# Patient Record
Sex: MALE | Race: WHITE | NOT HISPANIC OR LATINO | URBAN - METROPOLITAN AREA
[De-identification: names, ages, dates, MRNs, and addresses within clinical notes are randomized per-mention and may not be internally consistent; named-entity substitution may affect disease eponyms.]

---

## 2018-08-27 ENCOUNTER — OUTPATIENT (OUTPATIENT)
Dept: OUTPATIENT SERVICES | Facility: HOSPITAL | Age: 68
LOS: 1 days | Discharge: STILL ADMITTED OR ADMINISTRATIVE | End: 2018-08-27

## 2018-08-27 ENCOUNTER — EMERGENCY (EMERGENCY)
Facility: HOSPITAL | Age: 68
LOS: 1 days | Discharge: HOME | End: 2018-08-27
Admitting: EMERGENCY MEDICINE
Payer: MEDICARE

## 2018-08-27 VITALS
RESPIRATION RATE: 16 BRPM | SYSTOLIC BLOOD PRESSURE: 129 MMHG | OXYGEN SATURATION: 98 % | DIASTOLIC BLOOD PRESSURE: 94 MMHG | HEART RATE: 61 BPM | TEMPERATURE: 98 F

## 2018-08-27 PROBLEM — Z00.00 ENCOUNTER FOR PREVENTIVE HEALTH EXAMINATION: Status: ACTIVE | Noted: 2018-08-27

## 2018-08-27 PROCEDURE — 99283 EMERGENCY DEPT VISIT LOW MDM: CPT

## 2018-08-27 NOTE — ED PROVIDER NOTE - OBJECTIVE STATEMENT
This is a 68 year old Male BIBIA from home with His wife for eval r/t insomnia. Patient is seeing a PMD who was prescribing Ambien and Xanax recent he was switch to Trazadone 100 mg PO at bedtime and told to follow up with a psychiatrist. Patient has been unable to see a psychiatrist and reports insomnia for two days. Denies SI/HI Denies AH/VH Denies ETOH/Illicit drugs Rodney past Si attempts ,hospitalizations. Wife Rosa [phone] does not have any safety concers for self or patient

## 2018-08-27 NOTE — ED PROVIDER NOTE - MEDICAL DECISION MAKING DETAILS
This is a 68 year old Male BIBIA from home with His wife for eval r/t insomnia. Patient is seeing a PMD who was prescribing Ambien and Xanax recent he was switch to Trazadone 100 mg PO at bedtime and told to follow up with a psychiatrist. Patient has been unable to see a psychiatrist and reports insomnia for two days Medical evaluation performed. There is no clinical evidence of intoxication or any acute medical problem requiring immediate intervention. recommend follow up with [facility] crisis center

## 2018-08-27 NOTE — ED ADULT TRIAGE NOTE - CHIEF COMPLAINT QUOTE
PMH insomnia, normally given ambien from primary, switched to a new medication. Pt states he has not slept in 3 days.

## 2018-08-28 DIAGNOSIS — F41.9 ANXIETY DISORDER, UNSPECIFIED: ICD-10-CM

## 2018-08-28 DIAGNOSIS — G47.00 INSOMNIA, UNSPECIFIED: ICD-10-CM

## 2019-01-28 NOTE — ED PROVIDER NOTE - NS ED ROS FT
no
Denies chest pain, SOB, N/V/D and fevers, Denies palpitations or diaphoresis. Denies Numbness, Tingling, Blurry Vision and HA.   Denies recent falls, trauma and injuries. Denies pain or any other medical complaints.

## 2021-11-02 ENCOUNTER — INPATIENT (INPATIENT)
Facility: HOSPITAL | Age: 71
LOS: 0 days | Discharge: ANOTHER HOSPITAL | DRG: 177 | End: 2021-11-03
Attending: INTERNAL MEDICINE | Admitting: INTERNAL MEDICINE
Payer: MEDICARE

## 2021-11-02 VITALS
DIASTOLIC BLOOD PRESSURE: 91 MMHG | OXYGEN SATURATION: 93 % | TEMPERATURE: 98 F | SYSTOLIC BLOOD PRESSURE: 138 MMHG | HEIGHT: 71 IN | HEART RATE: 76 BPM | RESPIRATION RATE: 16 BRPM | WEIGHT: 175.05 LBS

## 2021-11-02 DIAGNOSIS — R74.01 ELEVATION OF LEVELS OF LIVER TRANSAMINASE LEVELS: ICD-10-CM
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
## 2021-11-02 DIAGNOSIS — M62.82 RHABDOMYOLYSIS: ICD-10-CM

## 2021-11-02 DIAGNOSIS — U07.1 COVID-19: ICD-10-CM

## 2021-11-02 DIAGNOSIS — I10 ESSENTIAL (PRIMARY) HYPERTENSION: ICD-10-CM

## 2021-11-02 DIAGNOSIS — R09.89 OTHER SPECIFIED SYMPTOMS AND SIGNS INVOLVING THE CIRCULATORY AND RESPIRATORY SYSTEMS: ICD-10-CM

## 2021-11-02 DIAGNOSIS — Z29.9 ENCOUNTER FOR PROPHYLACTIC MEASURES, UNSPECIFIED: ICD-10-CM

## 2021-11-02 DIAGNOSIS — J96.01 ACUTE RESPIRATORY FAILURE WITH HYPOXIA: ICD-10-CM

## 2021-11-02 LAB
ALBUMIN SERPL ELPH-MCNC: 2.6 G/DL — LOW (ref 3.5–5)
ALP SERPL-CCNC: 53 U/L — SIGNIFICANT CHANGE UP (ref 40–120)
ALT FLD-CCNC: 96 U/L DA — HIGH (ref 10–60)
AMPHET UR-MCNC: NEGATIVE — SIGNIFICANT CHANGE UP
ANION GAP SERPL CALC-SCNC: 9 MMOL/L — SIGNIFICANT CHANGE UP (ref 5–17)
APAP SERPL-MCNC: <2 UG/ML — LOW (ref 10–30)
APPEARANCE UR: CLEAR — SIGNIFICANT CHANGE UP
AST SERPL-CCNC: 327 U/L — HIGH (ref 10–40)
BARBITURATES UR SCN-MCNC: NEGATIVE — SIGNIFICANT CHANGE UP
BASE EXCESS BLDA CALC-SCNC: 6.3 MMOL/L — HIGH (ref -2–3)
BASOPHILS # BLD AUTO: 0.01 K/UL — SIGNIFICANT CHANGE UP (ref 0–0.2)
BASOPHILS NFR BLD AUTO: 0.1 % — SIGNIFICANT CHANGE UP (ref 0–2)
BENZODIAZ UR-MCNC: POSITIVE
BILIRUB SERPL-MCNC: 1 MG/DL — SIGNIFICANT CHANGE UP (ref 0.2–1.2)
BILIRUB UR-MCNC: NEGATIVE — SIGNIFICANT CHANGE UP
BLOOD GAS COMMENTS ARTERIAL: SIGNIFICANT CHANGE UP
BUN SERPL-MCNC: 38 MG/DL — HIGH (ref 7–18)
CALCIUM SERPL-MCNC: 8.9 MG/DL — SIGNIFICANT CHANGE UP (ref 8.4–10.5)
CHLORIDE SERPL-SCNC: 103 MMOL/L — SIGNIFICANT CHANGE UP (ref 96–108)
CK SERPL-CCNC: 9896 U/L — HIGH (ref 35–232)
CK SERPL-CCNC: CRITICAL HIGH U/L (ref 35–232)
CO2 SERPL-SCNC: 26 MMOL/L — SIGNIFICANT CHANGE UP (ref 22–31)
COCAINE METAB.OTHER UR-MCNC: NEGATIVE — SIGNIFICANT CHANGE UP
COLOR SPEC: YELLOW — SIGNIFICANT CHANGE UP
CREAT SERPL-MCNC: 0.8 MG/DL — SIGNIFICANT CHANGE UP (ref 0.5–1.3)
D DIMER BLD IA.RAPID-MCNC: 988 NG/ML DDU — HIGH
DIFF PNL FLD: ABNORMAL
EOSINOPHIL # BLD AUTO: 0 K/UL — SIGNIFICANT CHANGE UP (ref 0–0.5)
EOSINOPHIL NFR BLD AUTO: 0 % — SIGNIFICANT CHANGE UP (ref 0–6)
ETHANOL SERPL-MCNC: <3 MG/DL — SIGNIFICANT CHANGE UP (ref 0–10)
GLUCOSE SERPL-MCNC: 121 MG/DL — HIGH (ref 70–99)
GLUCOSE UR QL: NEGATIVE — SIGNIFICANT CHANGE UP
HCO3 BLDA-SCNC: 30 MMOL/L — HIGH (ref 21–28)
HCT VFR BLD CALC: 48.1 % — SIGNIFICANT CHANGE UP (ref 39–50)
HGB BLD-MCNC: 16.5 G/DL — SIGNIFICANT CHANGE UP (ref 13–17)
HOROWITZ INDEX BLDA+IHG-RTO: SIGNIFICANT CHANGE UP
IMM GRANULOCYTES NFR BLD AUTO: 0.5 % — SIGNIFICANT CHANGE UP (ref 0–1.5)
KETONES UR-MCNC: NEGATIVE — SIGNIFICANT CHANGE UP
LACTATE SERPL-SCNC: 1.9 MMOL/L — SIGNIFICANT CHANGE UP (ref 0.7–2)
LEUKOCYTE ESTERASE UR-ACNC: NEGATIVE — SIGNIFICANT CHANGE UP
LYMPHOCYTES # BLD AUTO: 1.16 K/UL — SIGNIFICANT CHANGE UP (ref 1–3.3)
LYMPHOCYTES # BLD AUTO: 14.4 % — SIGNIFICANT CHANGE UP (ref 13–44)
MAGNESIUM SERPL-MCNC: 2.8 MG/DL — HIGH (ref 1.6–2.6)
MCHC RBC-ENTMCNC: 30.1 PG — SIGNIFICANT CHANGE UP (ref 27–34)
MCHC RBC-ENTMCNC: 34.3 GM/DL — SIGNIFICANT CHANGE UP (ref 32–36)
MCV RBC AUTO: 87.6 FL — SIGNIFICANT CHANGE UP (ref 80–100)
METHADONE UR-MCNC: NEGATIVE — SIGNIFICANT CHANGE UP
MONOCYTES # BLD AUTO: 0.8 K/UL — SIGNIFICANT CHANGE UP (ref 0–0.9)
MONOCYTES NFR BLD AUTO: 9.9 % — SIGNIFICANT CHANGE UP (ref 2–14)
NEUTROPHILS # BLD AUTO: 6.04 K/UL — SIGNIFICANT CHANGE UP (ref 1.8–7.4)
NEUTROPHILS NFR BLD AUTO: 75.1 % — SIGNIFICANT CHANGE UP (ref 43–77)
NITRITE UR-MCNC: NEGATIVE — SIGNIFICANT CHANGE UP
NRBC # BLD: 0 /100 WBCS — SIGNIFICANT CHANGE UP (ref 0–0)
OPIATES UR-MCNC: NEGATIVE — SIGNIFICANT CHANGE UP
PCO2 BLDA: 37 MMHG — SIGNIFICANT CHANGE UP (ref 35–48)
PCP SPEC-MCNC: SIGNIFICANT CHANGE UP
PCP UR-MCNC: NEGATIVE — SIGNIFICANT CHANGE UP
PH BLDA: 7.51 — HIGH (ref 7.35–7.45)
PH UR: 6 — SIGNIFICANT CHANGE UP (ref 5–8)
PLATELET # BLD AUTO: 127 K/UL — LOW (ref 150–400)
PO2 BLDA: 72 MMHG — LOW (ref 83–108)
POTASSIUM SERPL-MCNC: 3 MMOL/L — LOW (ref 3.5–5.3)
POTASSIUM SERPL-SCNC: 3 MMOL/L — LOW (ref 3.5–5.3)
PROT SERPL-MCNC: 6.7 G/DL — SIGNIFICANT CHANGE UP (ref 6–8.3)
PROT UR-MCNC: 100
RBC # BLD: 5.49 M/UL — SIGNIFICANT CHANGE UP (ref 4.2–5.8)
RBC # FLD: 13.5 % — SIGNIFICANT CHANGE UP (ref 10.3–14.5)
SALICYLATES SERPL-MCNC: <1.7 MG/DL — LOW (ref 2.8–20)
SAO2 % BLDA: 96 % — SIGNIFICANT CHANGE UP
SARS-COV-2 RNA SPEC QL NAA+PROBE: DETECTED
SODIUM SERPL-SCNC: 138 MMOL/L — SIGNIFICANT CHANGE UP (ref 135–145)
SP GR SPEC: 1.02 — SIGNIFICANT CHANGE UP (ref 1.01–1.02)
THC UR QL: NEGATIVE — SIGNIFICANT CHANGE UP
TROPONIN I, HIGH SENSITIVITY RESULT: 244 NG/L — HIGH
TROPONIN I, HIGH SENSITIVITY RESULT: 247.8 NG/L — HIGH
UROBILINOGEN FLD QL: 4
WBC # BLD: 8.05 K/UL — SIGNIFICANT CHANGE UP (ref 3.8–10.5)
WBC # FLD AUTO: 8.05 K/UL — SIGNIFICANT CHANGE UP (ref 3.8–10.5)

## 2021-11-02 PROCEDURE — 83605 ASSAY OF LACTIC ACID: CPT

## 2021-11-02 PROCEDURE — 96375 TX/PRO/DX INJ NEW DRUG ADDON: CPT

## 2021-11-02 PROCEDURE — 85379 FIBRIN DEGRADATION QUANT: CPT

## 2021-11-02 PROCEDURE — 80307 DRUG TEST PRSMV CHEM ANLYZR: CPT

## 2021-11-02 PROCEDURE — 80053 COMPREHEN METABOLIC PANEL: CPT

## 2021-11-02 PROCEDURE — 96376 TX/PRO/DX INJ SAME DRUG ADON: CPT

## 2021-11-02 PROCEDURE — 87635 SARS-COV-2 COVID-19 AMP PRB: CPT

## 2021-11-02 PROCEDURE — 71045 X-RAY EXAM CHEST 1 VIEW: CPT

## 2021-11-02 PROCEDURE — 84484 ASSAY OF TROPONIN QUANT: CPT

## 2021-11-02 PROCEDURE — 80074 ACUTE HEPATITIS PANEL: CPT

## 2021-11-02 PROCEDURE — 93010 ELECTROCARDIOGRAM REPORT: CPT

## 2021-11-02 PROCEDURE — 83735 ASSAY OF MAGNESIUM: CPT

## 2021-11-02 PROCEDURE — 70450 CT HEAD/BRAIN W/O DYE: CPT | Mod: MA

## 2021-11-02 PROCEDURE — 82962 GLUCOSE BLOOD TEST: CPT

## 2021-11-02 PROCEDURE — 99285 EMERGENCY DEPT VISIT HI MDM: CPT | Mod: CS

## 2021-11-02 PROCEDURE — 82550 ASSAY OF CK (CPK): CPT

## 2021-11-02 PROCEDURE — 70450 CT HEAD/BRAIN W/O DYE: CPT | Mod: 26,MA

## 2021-11-02 PROCEDURE — 87086 URINE CULTURE/COLONY COUNT: CPT

## 2021-11-02 PROCEDURE — 71045 X-RAY EXAM CHEST 1 VIEW: CPT | Mod: 26

## 2021-11-02 PROCEDURE — 99285 EMERGENCY DEPT VISIT HI MDM: CPT | Mod: 25

## 2021-11-02 PROCEDURE — 87040 BLOOD CULTURE FOR BACTERIA: CPT

## 2021-11-02 PROCEDURE — 82803 BLOOD GASES ANY COMBINATION: CPT

## 2021-11-02 PROCEDURE — 85025 COMPLETE CBC W/AUTO DIFF WBC: CPT

## 2021-11-02 PROCEDURE — 93005 ELECTROCARDIOGRAM TRACING: CPT

## 2021-11-02 PROCEDURE — 81001 URINALYSIS AUTO W/SCOPE: CPT

## 2021-11-02 PROCEDURE — 96374 THER/PROPH/DIAG INJ IV PUSH: CPT

## 2021-11-02 PROCEDURE — 36415 COLL VENOUS BLD VENIPUNCTURE: CPT

## 2021-11-02 RX ORDER — SODIUM CHLORIDE 9 MG/ML
1000 INJECTION INTRAMUSCULAR; INTRAVENOUS; SUBCUTANEOUS ONCE
Refills: 0 | Status: COMPLETED | OUTPATIENT
Start: 2021-11-02 | End: 2021-11-02

## 2021-11-02 RX ORDER — ENOXAPARIN SODIUM 100 MG/ML
40 INJECTION SUBCUTANEOUS DAILY
Refills: 0 | Status: DISCONTINUED | OUTPATIENT
Start: 2021-11-02 | End: 2021-11-02

## 2021-11-02 RX ORDER — ENOXAPARIN SODIUM 100 MG/ML
80 INJECTION SUBCUTANEOUS
Refills: 0 | Status: DISCONTINUED | OUTPATIENT
Start: 2021-11-02 | End: 2021-11-03

## 2021-11-02 RX ORDER — POTASSIUM CHLORIDE 20 MEQ
10 PACKET (EA) ORAL
Refills: 0 | Status: COMPLETED | OUTPATIENT
Start: 2021-11-02 | End: 2021-11-02

## 2021-11-02 RX ORDER — PANTOPRAZOLE SODIUM 20 MG/1
40 TABLET, DELAYED RELEASE ORAL DAILY
Refills: 0 | Status: DISCONTINUED | OUTPATIENT
Start: 2021-11-02 | End: 2021-11-03

## 2021-11-02 RX ORDER — SODIUM CHLORIDE 9 MG/ML
1000 INJECTION INTRAMUSCULAR; INTRAVENOUS; SUBCUTANEOUS
Refills: 0 | Status: DISCONTINUED | OUTPATIENT
Start: 2021-11-02 | End: 2021-11-03

## 2021-11-02 RX ORDER — DEXAMETHASONE 0.5 MG/5ML
6 ELIXIR ORAL DAILY
Refills: 0 | Status: DISCONTINUED | OUTPATIENT
Start: 2021-11-03 | End: 2021-11-03

## 2021-11-02 RX ORDER — ASPIRIN/CALCIUM CARB/MAGNESIUM 324 MG
300 TABLET ORAL ONCE
Refills: 0 | Status: COMPLETED | OUTPATIENT
Start: 2021-11-02 | End: 2021-11-02

## 2021-11-02 RX ORDER — DEXAMETHASONE 0.5 MG/5ML
6 ELIXIR ORAL ONCE
Refills: 0 | Status: COMPLETED | OUTPATIENT
Start: 2021-11-02 | End: 2021-11-02

## 2021-11-02 RX ADMIN — Medication 6 MILLIGRAM(S): at 15:15

## 2021-11-02 RX ADMIN — Medication 100 MILLIEQUIVALENT(S): at 15:15

## 2021-11-02 RX ADMIN — SODIUM CHLORIDE 1000 MILLILITER(S): 9 INJECTION INTRAMUSCULAR; INTRAVENOUS; SUBCUTANEOUS at 16:16

## 2021-11-02 RX ADMIN — SODIUM CHLORIDE 1000 MILLILITER(S): 9 INJECTION INTRAMUSCULAR; INTRAVENOUS; SUBCUTANEOUS at 13:33

## 2021-11-02 RX ADMIN — SODIUM CHLORIDE 1000 MILLILITER(S): 9 INJECTION INTRAMUSCULAR; INTRAVENOUS; SUBCUTANEOUS at 15:16

## 2021-11-02 RX ADMIN — ENOXAPARIN SODIUM 80 MILLIGRAM(S): 100 INJECTION SUBCUTANEOUS at 20:56

## 2021-11-02 RX ADMIN — Medication 100 MILLIEQUIVALENT(S): at 17:23

## 2021-11-02 RX ADMIN — SODIUM CHLORIDE 1000 MILLILITER(S): 9 INJECTION INTRAMUSCULAR; INTRAVENOUS; SUBCUTANEOUS at 14:33

## 2021-11-02 RX ADMIN — Medication 100 MILLIEQUIVALENT(S): at 14:08

## 2021-11-02 RX ADMIN — SODIUM CHLORIDE 150 MILLILITER(S): 9 INJECTION INTRAMUSCULAR; INTRAVENOUS; SUBCUTANEOUS at 19:34

## 2021-11-02 RX ADMIN — Medication 300 MILLIGRAM(S): at 14:07

## 2021-11-02 NOTE — ED PROVIDER NOTE - CARE PLAN
1 Principal Discharge DX:	COVID-19 virus infection  Secondary Diagnosis:	Acute respiratory failure with hypoxia  Secondary Diagnosis:	Rhabdomyolysis  Secondary Diagnosis:	Acute encephalopathy  Secondary Diagnosis:	Demand ischemia of myocardium

## 2021-11-02 NOTE — ED ADULT NURSE REASSESSMENT NOTE - NS ED NURSE REASSESS COMMENT FT1
recived from ANUM Castaneda pt on bed sleeping arousable not in distress , on cont. cardiac monitoring NSR 75/min, cont oxygen at 4-5 L/min NC, with posey in placed draining well light yellow urine, with NS in progess, IV site right FA no   redness or swelling noted. Will cont. care,

## 2021-11-02 NOTE — ED PROVIDER NOTE - CLINICAL SUMMARY MEDICAL DECISION MAKING FREE TEXT BOX
Character low suspicion for CVA and no focal or localizing findings. Noted elevated trop, no CP/SOB and low suspicion for ACS. No e/o DVT and low suspicion for PE. No e/o trauma on exam. Noted hypoxia c/w COVID and CXR findings, no WOB, satting well on 4L NC O2. Character low suspicion for CVA and no focal or localizing findings. Noted elevated trop, no CP/SOB and low suspicion for ACS. No e/o DVT and low suspicion for PE. No e/o trauma on exam. Noted hypoxia c/w COVID and CXR findings, no WOB, satting well on 4L NC O2. No e/o fluid overload. D/w family at length. Patient well appearing, hemodynamically stable. Admitted to internal medicine for further monitoring, w/u, and care.

## 2021-11-02 NOTE — H&P ADULT - PROBLEM SELECTOR PLAN 4
Pt has hx of HTN as per pt's daughter, however unknown medications Pt p/w CK 10k secondary to fall, s/p 2 liter bolus  On IVF 150cc/hr x 6 hrs with close monitoring of fluid status given COVID hypoxia  Creatinine wnl  Urine shows blood in urine  Will monitor respiratory status in 6 hours in order to continue further fluids

## 2021-11-02 NOTE — H&P ADULT - HISTORY OF PRESENT ILLNESS
Patient is a 71 year old English- speaking male coming form home lives with his wife, ambulates independently with PMHx HTN presenting with worsening mentation and lethargy s/p unwitnessed fall in the setting of COVID-19 infection, not vaccinated. Patient is a poor historian, too obtunded to participate during encounter despite multiple sternal rubs and Mongolian .  Patient is a 71 year old Nepalese- speaking male coming form home lives with his wife, ambulates independently with PMHx HTN presenting with worsening mentation and lethargy s/p unwitnessed fall in the setting of COVID-19 infection, not vaccinated. Patient is a poor historian, too obtunded to participate during encounter despite multiple sternal rubs and Russian . Patient found to be off O2, saturating in the low 80s on RA, SpO2 corrected to 95% on 5L NC. Collateral obtained from family, per family patient had an unwitnessed fall coming from the bathroom where he fell and  remained on the floor for more than >1 day. Patient is AOx2, however is obtunded requiring painful stimuli and O2 supplementation. Patient denied fevers, SOB, chest pain, or any changes in BM and urination. Patient wishes to remain FULL CODE.  Patient is a 71 year old Macanese- speaking male coming form home lives with his wife, ambulates independently with PMHx HTN presenting with worsening mentation and lethargy s/p unwitnessed fall in the setting of COVID-19 infection, not vaccinated. Patient is a poor historian, lethargic hence was not able to participate during interview encounter. Pt was found to be saturating in the low 80s on RA, SpO2 corrected to 95% on 5L NC. Collateral obtained from family, per family patient had an unwitnessed fall coming from the bathroom where he fell and  remained on the floor for more than >1 day. Patient is AOx2, however is lethargic, responds to painful stimuli and O2 supplementation. Patient denied fevers, SOB, chest pain, or any changes in BM and urination. Patient wishes to remain FULL CODE.  Patient is a 71 year old Jordanian-speaking male coming form home lives with his wife, ambulates independently with PMHx HTN presenting with worsening mentation and lethargy s/p unwitnessed fall in the setting of COVID-19 infection, not vaccinated. Patient is a poor historian, lethargic hence was not able to participate during interview encounter. Pt was found to be saturating in the low 80s on RA, SpO2 corrected to 95% on 5L NC. Collateral obtained from family, per family patient had an unwitnessed fall coming from the bathroom where he fell and  remained on the floor for more than >1 day. Patient is AOx2, however is lethargic, responds to painful stimuli and O2 supplementation. Patient denied fevers, SOB, chest pain, or any changes in BM and urination. Patient wishes to remain FULL CODE.  Patient is a 71 year old Haitian-speaking male coming from home lives with his wife, ambulates independently with PMHx HTN presenting with worsening mentation and lethargy s/p unwitnessed fall in the setting of COVID-19 infection, not vaccinated. Patient is a poor historian, lethargic hence was not able to participate during interview encounter. Pt was found to be saturating in the low 80s on RA, SpO2 corrected to 95% on 5L NC. Collateral obtained from family, per family patient had an unwitnessed fall coming from the bathroom where he fell and  remained on the floor for more than >1 day. Patient is AOx2, lethargic, responds to painful stimuli and O2 supplementation. Patient denied fevers, SOB, chest pain, or any changes in BM and urination. Patient wishes to remain FULL CODE.     In the ED, pt's vitals were recorded as  /91, HR 76/min, RR 16/min, 93% on RA---> now requiring 5 L NC saturating 95%  EKG Right bundle branch block  As per pt's daughter Manuela, pt takes Hydrocodone, Diazepam, Mirtazapine at home due to "back surgeries/back problems and depression." Utox positive for BZD

## 2021-11-02 NOTE — H&P ADULT - ASSESSMENT
71 year old Jamaican-speaking male coming from home lives with his wife, ambulates independently with PMHx HTN presenting with worsening mentation and lethargy s/p unwitnessed fall in the setting of COVID-19 infection admitted for Acute hypoxic respiratory failure likely secondary to COVID PNA.    ***Please call [facility] pharmacy in AM, pharmacy closed, family is not full aware of the medications  [address] [facility] [address]   [phone]   71 year old Zimbabwean-speaking male coming from home lives with his wife, ambulates independently with PMHx HTN presenting with worsening mentation and lethargy s/p unwitnessed fall in the setting of COVID-19 infection admitted for Acute hypoxic respiratory failure likely secondary to COVID PNA.    ***Please call [facility] in AM, pharmacy closed, family is not full aware of the medications  [address]   [phone]    Please re-visit [facility] conversation with pt's daughter. Currently FULL CODE but pt's daughter Manuela HCP says she needs additional time to rethink the decision

## 2021-11-02 NOTE — H&P ADULT - PROBLEM SELECTOR PLAN 1
p/w with worsening lethargy 2/2 hypoxia in the setting of COVID infection -p/w with worsening lethargy 2/2 hypoxia in the setting of COVID infection  -Documented to have 93% on RA on admission, now requiring 5 L NC to saturate 95%  -D-dimers 988, Right bundle branch block on EKG, pt would benefit from CT angio chest to r/out PE however pt's daughter Manuela HCP DOES NOT want pt to undergo imaging at this time, in agreement to start Lovenox full dose for now  -S/p 1 dose of Decadron IV in ED, c/w IV Decadron 6 mg x 9 days, IV protonix  No remdesivir given elevated LFTs -p/w with worsening lethargy 2/2 hypoxia in the setting of COVID infection  -Documented to have 93% on RA on admission, now requiring 5 L NC to saturate 95%  -D-dimers 988, Right bundle branch block on EKG, pt would benefit from CT angio chest to r/out PE however pt's daughter Manuela HCP DOES NOT want pt to undergo imaging at this time, in agreement to start Lovenox full dose for now  -S/p 1 dose of Decadron IV in ED, c/w IV Decadron 6 mg x 9 days, IV Protonix  No remdesivir given elevated LFTs  Pulm Dr. Mcpherson consulted

## 2021-11-02 NOTE — ACUTE INTERFACILITY TRANSFER NOTE - PLAN OF CARE
71 year old Solomon Islander-speaking male coming from home lives with his wife, ambulates independently with PMHx HTN presenting with worsening mentation and lethargy s/p unwitnessed fall in the setting of COVID-19 infection admitted for Acute hypoxic respiratory failure likely secondary to COVID PNA. 71 year old Faroese-speaking male coming from home lives with his wife, ambulates independently with PMHx HTN presenting with worsening mentation and lethargy s/p unwitnessed fall in the setting of COVID-19 infection admitted for Acute hypoxic respiratory failure likely secondary to COVID PNA.    Transfer report to Dr. Nascimento

## 2021-11-02 NOTE — ACUTE INTERFACILITY TRANSFER NOTE - HOSPITAL COURSE
Transfer requested by family Manuela [phone] - Patient to transfer to [place] receiving Dr. Bertha Hodge    71 year old Nepali-speaking male coming from home lives with his wife, ambulates independently with PMHx HTN presenting with worsening mentation and lethargy s/p unwitnessed fall in the setting of COVID-19 infection admitted for Acute hypoxic respiratory failure likely secondary to COVID PNA.   Patient noted to have 93% on RA on admission, now requiring 5 L NC to saturate 95%, D-dimers 988, Right bundle branch block on EKG, pt would benefit from CT angio chest to r/out PE however pt's daughter Manuela HCP DOES NOT want pt to undergo imaging at this time, in agreement to start Lovenox full dose for now,   Patient S/p 1 dose of Decadron IV in ED, c/w IV Decadron 6 mg x 9 days, and . and  IV Protonix. No remdesivir given elevated LFTs  Pulm Dr. Mcpherson consulted.    CODE STATUS:  Currently FULL CODE but pt's daughter Manuela HCP says she needs additional time to rethink the decision

## 2021-11-02 NOTE — ED ADULT NURSE NOTE - NSFALLRSKPASTHIST_ED_ALL_ED
yes Quality 265: Biopsy Follow-Up: Biopsy results reviewed, communicated, tracked, and documented Detail Level: Detailed Quality 130: Documentation Of Current Medications In The Medical Record: Current Medications Documented

## 2021-11-02 NOTE — H&P ADULT - NSHPSOCIALHISTORY_GEN_ALL_CORE
Patient is a 71 year old male coming form home lives with his wife, ambulates independently.l    Patient is a former smoker, unable to recall exact smoking pack year history.  Patient denies illicit drug use. Patient is a 71 year old male coming form home lives with his wife, ambulates independently.    Patient is a former smoker, unable to recall exact smoking pack year history.  Patient denies illicit drug use.

## 2021-11-02 NOTE — ED PROVIDER NOTE - OBJECTIVE STATEMENT
PLEASE CALL [facility] FOR ALL QUESTIONS/DECISIONS: [phone].  Ugandan  [id number].  71yoM with h/o HTN, no COVID vaccines, presents with weakness. Per his wife at bedside, he fell off the toilet 3 days ago though he did not hit anything and ended up on the toilet afterward. After this he went into the living room and lay on the floor and since then he has not gotten up. He is only drinking water but that is all, and no meds. No actual fall, no head trauma. No fever, v/d, bleeding, cough, and all other symptoms. Pt himself denies all symptoms.

## 2021-11-02 NOTE — H&P ADULT - NSHPREVIEWOFSYSTEMS_GEN_ALL_CORE
REVIEW OF SYSTEMS: UNABLE TO OBTAIN FULL ROS given pt's lethargy    CONSTITUTIONAL: No weakness, fevers or chills  EYES/ENT: No visual changes;  No vertigo or throat pain   NECK: No pain or stiffness  RESPIRATORY: No cough, wheezing, hemoptysis; No shortness of breath  CARDIOVASCULAR: No chest pain or palpitations  GASTROINTESTINAL: No abdominal or epigastric pain. No nausea, vomiting, or hematemesis; No diarrhea or constipation. No melena or hematochezia.  GENITOURINARY: No dysuria, frequency or hematuria  NEUROLOGICAL: No numbness or weakness  SKIN: No itching, burning, rashes, or lesions   All other review of systems is negative unless indicated above.

## 2021-11-02 NOTE — ED PROVIDER NOTE - SECONDARY DIAGNOSIS.
Acute respiratory failure with hypoxia Acute encephalopathy Demand ischemia of myocardium Rhabdomyolysis

## 2021-11-02 NOTE — CHART NOTE - NSCHARTNOTEFT_GEN_A_CORE
Spoke to both of pt's daughters Alysha ([phone]) and Manuela ([facility], [phone]), discussed extensively that pt would benefit from obtaining CT Angio Chest to r/out PE given worsening hypoxia, elevated D-dimers with uncertain medical hx other than s/p fall since "last Thursday." Pt's daughters stated that they want to hold off on any imaging at this time, would like their father to get hydrated and want to work on obtaining further imaging maybe tomorrow after re-assessing patient's clinical status. They are in agreement to start patient on empiric full dose anticoagulation in the interim. All questions answered. Spoke to both of pt's daughters Alysha ([phone]) and Manuela ([facility], [phone]), discussed extensively that pt would benefit from obtaining CT Angio Chest to r/out PE given worsening hypoxia, EKG showing Right bundle branch block (not previous EKG for comparison), elevated D-dimers with uncertain medical hx other than s/p fall since "last Thursday." Pt's daughters stated that they want to hold off on any imaging at this time, would like their father to get hydrated and want to work on obtaining further imaging maybe tomorrow after re-assessing patient's clinical status. They are in agreement to start patient on empiric full dose anticoagulation in the interim. All questions answered.

## 2021-11-02 NOTE — ED ADULT NURSE NOTE - OBJECTIVE STATEMENT
Pt c/o fall 5 days ago. As per wife, pt was sitting on the toilet and fell forward without hitting the floor. Pt then walked to the living room and laid himself on the floor. Denies LOC, vomiting, diarrhea, cough, fever, or bleeding.

## 2021-11-02 NOTE — ACUTE INTERFACILITY TRANSFER NOTE - NS MD INTERFACILITY TRANSFER INST FT
Transfer requested by family Manuela [phone] - Patient to transfer to [place] receiving Dr. Bertha Hodge    71 year old German-speaking male coming from home lives with his wife, ambulates independently with PMHx HTN presenting with worsening mentation and lethargy s/p unwitnessed fall in the setting of COVID-19 infection admitted for Acute hypoxic respiratory failure likely secondary to COVID PNA.   Patient noted to have 93% on RA on admission, now requiring 5 L NC to saturate 95%, D-dimers 988, Right bundle branch block on EKG, pt would benefit from CT angio chest to r/out PE however pt's daughter Manuela HCP DOES NOT want pt to undergo imaging at this time, in agreement to start Lovenox full dose for now,   Patient S/p 1 dose of Decadron IV in ED, c/w IV Decadron 6 mg x 9 days, and . and  IV Protonix. No remdesivir given elevated LFTs  Pulm Dr. Mcpherson consulted.    CODE STATUS:  Currently FULL CODE but pt's daughter Manuela HCP says she needs additional time to rethink the decision   Transfer requested by family Manuela [phone] - Patient to transfer to [place] receiving Dr. Bertha Hodge    71 year old Kinyarwanda-speaking male coming from home lives with his wife, ambulates independently with PMHx HTN presenting with worsening mentation and lethargy s/p unwitnessed fall in the setting of COVID-19 infection admitted for Acute hypoxic respiratory failure likely secondary to COVID PNA.   Patient noted to have 93% on RA on admission, now requiring 5 L NC to saturate 95%, D-dimers 988, Right bundle branch block on EKG, pt would benefit from CT angio chest to r/out PE however pt's daughter Manuela HCP DOES NOT want pt to undergo imaging at this time, in agreement to start Lovenox full dose for now,   Patient S/p 1 dose of Decadron IV in ED, c/w IV Decadron 6 mg x 9 days, and . and  IV Protonix. No remdesivir given elevated LFTs  Pulm Dr. Mcpherson consulted.     CODE STATUS:  Currently FULL CODE but pt's daughter Manuela HCP says she needs additional time to rethink the decision   Transfer requested by family Roy [phone] - Patient to transfer to [place] receiving Dr. Bertha Hodge    71 year old Romanian-speaking male coming from home lives with his wife, ambulates independently with PMHx HTN presenting with worsening mentation and lethargy s/p unwitnessed fall in the setting of COVID-19 infection admitted for Acute hypoxic respiratory failure likely secondary to COVID PNA.   Patient noted to have 93% on RA on admission, now requiring 5 L NC to saturate 95%, D-dimers 988, Right bundle branch block on EKG, pt would benefit from CT angio chest to r/out PE however pt's daughter Manuela HCP DOES NOT want pt to undergo imaging at this time, in agreement to start Lovenox full dose for now,   Patient S/p 1 dose of Decadron IV in ED, c/w IV Decadron 6 mg x 9 days, and . and  IV Protonix. No remdesivir given elevated LFTs  Pulm Dr. Mcpherson consulted.   CODE STATUS:  Currently FULL CODE but pt's daughter Manuela HCP says she needs additional time to rethink the decision   Prescriber aware and approved. Need to call pharmacy to obtain med rec  -  family/NOK unaware of current meds

## 2021-11-02 NOTE — H&P ADULT - NSHPPHYSICALEXAM_GEN_ALL_CORE
Vital Signs Last 24 Hrs  T(C): 36.9 (02 Nov 2021 19:42), Max: 36.9 (02 Nov 2021 19:42)  T(F): 98.5 (02 Nov 2021 19:42), Max: 98.5 (02 Nov 2021 19:42)  HR: 84 (02 Nov 2021 19:42) (76 - 84)  BP: 132/86 (02 Nov 2021 19:42) (111/71 - 138/91)  BP(mean): --  RR: 16 (02 Nov 2021 19:42) (16 - 16)  SpO2: 96% (02 Nov 2021 19:42) (93% - 97%)    GENERAL: NAD, lying in bed lethargic arousable to painful stimuli  HEAD:  Atraumatic, Normocephalic  EYES: EOMI, PERRLA, conjunctiva and sclera clear  ENT: Moist mucous membranes  NECK: Supple, No JVD  CHEST/LUNG: + b/l basalar crackles, nonlabored on 5L NC  HEART: Regular rate and rhythm; No murmurs, rubs, or gallops  ABDOMEN: Soft, Nontender, Nondistended.  EXTREMITIES:  2+ Peripheral Pulses. No clubbing or edema  NERVOUS SYSTEM:  Alert & Oriented X3, speech clear. No deficits   MSK: FROM all 4 extremities, full and equal strength  SKIN: No rashes or lesions Vital Signs Last 24 Hrs  T(C): 36.9 (02 Nov 2021 19:42), Max: 36.9 (02 Nov 2021 19:42)  T(F): 98.5 (02 Nov 2021 19:42), Max: 98.5 (02 Nov 2021 19:42)  HR: 84 (02 Nov 2021 19:42) (76 - 84)  BP: 132/86 (02 Nov 2021 19:42) (111/71 - 138/91)  BP(mean): --  RR: 16 (02 Nov 2021 19:42) (16 - 16)  SpO2: 96% (02 Nov 2021 19:42) (93% - 97%)    GENERAL: NAD, lying in bed lethargic arousable to painful stimuli  HEAD:  Atraumatic, Normocephalic  EYES: EOMI, PERRLA, conjunctiva and sclera clear  ENT: Moist mucous membranes  NECK: Supple, No JVD  CHEST/LUNG: + b/l basilar crackles, nonlabored on 5L NC  HEART: Regular rate and rhythm; No murmurs, rubs, or gallops  ABDOMEN: Soft, Nontender, Nondistended.  EXTREMITIES:  2+ Peripheral Pulses. No clubbing or edema  NERVOUS SYSTEM:  Alert & Oriented X1-2, lethargic  MSK: FROM all 4 extremities, full and equal strength  SKIN: No rashes or lesions

## 2021-11-02 NOTE — ED ADULT NURSE NOTE - PRO INTERPRETER NEED 2
[facility]  Medicine  [address], [place]   Phone: [phone]  Fax:   Follow Up Time:
[place]ong[place]

## 2021-11-02 NOTE — H&P ADULT - PROBLEM SELECTOR PLAN 3
Pt p/w AST/ALT Pt p/w AST/ALT, 327/96  Urobilinogen +  Ordered CT abdomen, however pt's daughters DO NOT want CT imaging at this time  Will monitor for now  No known liver hx in the past, could be related to COVID infection Pt p/w unwitnessed fall, no white count, no fevers, vitals significant for hypoxia, COVID PCR positive with left > right opacity in Chest xray  CT head negative for stroke and bleed  U/A not significant for white cells, only + for blood, urobilinogen+, urine protein 100 (secondary to Rhabdo)  CK 10k, will continue with IV hydration and monitor closely for fluid status in light of COVID hypoxia

## 2021-11-02 NOTE — H&P ADULT - PROBLEM SELECTOR PLAN 5
Pt p/w AST/ALT, 327/96  Urobilinogen +  Ordered CT abdomen, however pt's daughters DO NOT want CT imaging at this time  Will monitor for now  No known liver hx in the past, could be related to COVID infection

## 2021-11-02 NOTE — ED PROVIDER NOTE - PHYSICAL EXAMINATION
Afebrile, hemodynamically stable, desaturating 93% on RA  NAD, nontoxic appearing, sleeping and snoring, no WOB, speaking full sentences  Head NCAT  Pupils equal, EOMI, anicteric  MMM  No JVD  RRR, nml S1/S2, no m/r/g  Lungs CTAB, no w/r/r  Abd soft, NT, ND, nml BS, no rebound or guarding  Sleepy, arousable with sternal rub and able to provide hx and follow commands  CN's 3-12 grossly intact, motor exam grossly symmetric  PECK spontaneously, no leg cyanosis or edema  Skin warm, well perfused, no rashes or hives Afebrile, hemodynamically stable, desaturating 93% on RA  NAD, nontoxic appearing, sleeping and snoring, no WOB, speaking full sentences  Head NCAT  Pupils equal, EOMI, anicteric  MMM  No JVD  RRR, nml S1/S2, no m/r/g  Lungs CTAB, no w/r/r  Abd soft, NT, ND, nml BS, no rebound or guarding  Sleepy, arousable with sternal rub and able to provide hx and follow commands  CN's 3-12 grossly intact, motor exam grossly symmetric, NIH 0  PECK spontaneously, no leg cyanosis or edema  Skin warm, well perfused, no rashes or hives

## 2021-11-03 ENCOUNTER — INPATIENT (INPATIENT)
Facility: HOSPITAL | Age: 71
LOS: 14 days | Discharge: NURSING, REHAB OR LONG-TERM CARE | DRG: 177 | End: 2021-11-18
Payer: MEDICARE

## 2021-11-03 VITALS
RESPIRATION RATE: 18 BRPM | DIASTOLIC BLOOD PRESSURE: 96 MMHG | HEIGHT: 68 IN | HEART RATE: 80 BPM | OXYGEN SATURATION: 95 % | SYSTOLIC BLOOD PRESSURE: 151 MMHG

## 2021-11-03 VITALS
RESPIRATION RATE: 18 BRPM | DIASTOLIC BLOOD PRESSURE: 86 MMHG | SYSTOLIC BLOOD PRESSURE: 125 MMHG | HEART RATE: 81 BPM | OXYGEN SATURATION: 96 % | TEMPERATURE: 98 F

## 2021-11-03 DIAGNOSIS — R09.89 OTHER SPECIFIED SYMPTOMS AND SIGNS INVOLVING THE CIRCULATORY AND RESPIRATORY SYSTEMS: ICD-10-CM

## 2021-11-03 DIAGNOSIS — J96.01 ACUTE RESPIRATORY FAILURE WITH HYPOXIA: ICD-10-CM

## 2021-11-03 DIAGNOSIS — I10 ESSENTIAL (PRIMARY) HYPERTENSION: ICD-10-CM

## 2021-11-03 DIAGNOSIS — R77.8 OTHER SPECIFIED ABNORMALITIES OF PLASMA PROTEINS: ICD-10-CM

## 2021-11-03 DIAGNOSIS — R41.82 ALTERED MENTAL STATUS, UNSPECIFIED: ICD-10-CM

## 2021-11-03 DIAGNOSIS — U07.1 COVID-19: ICD-10-CM

## 2021-11-03 DIAGNOSIS — R74.01 ELEVATION OF LEVELS OF LIVER TRANSAMINASE LEVELS: ICD-10-CM

## 2021-11-03 DIAGNOSIS — Z29.9 ENCOUNTER FOR PROPHYLACTIC MEASURES, UNSPECIFIED: ICD-10-CM
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
## 2021-11-03 DIAGNOSIS — F32.9 MAJOR DEPRESSIVE DISORDER, SINGLE EPISODE, UNSPECIFIED: ICD-10-CM

## 2021-11-03 DIAGNOSIS — R74.8 ABNORMAL LEVELS OF OTHER SERUM ENZYMES: ICD-10-CM

## 2021-11-03 DIAGNOSIS — M54.9 DORSALGIA, UNSPECIFIED: ICD-10-CM

## 2021-11-03 LAB
A1C WITH ESTIMATED AVERAGE GLUCOSE RESULT: 5.7 % — HIGH (ref 4–5.6)
ALBUMIN SERPL ELPH-MCNC: 2.8 G/DL — LOW (ref 3.3–5)
ALBUMIN SERPL ELPH-MCNC: 3 G/DL — LOW (ref 3.3–5)
ALP SERPL-CCNC: 48 U/L — SIGNIFICANT CHANGE UP (ref 40–120)
ALP SERPL-CCNC: 52 U/L — SIGNIFICANT CHANGE UP (ref 40–120)
ALT FLD-CCNC: 71 U/L — HIGH (ref 10–45)
ALT FLD-CCNC: 79 U/L — HIGH (ref 10–45)
ANION GAP SERPL CALC-SCNC: 12 MMOL/L — SIGNIFICANT CHANGE UP (ref 5–17)
ANION GAP SERPL CALC-SCNC: 9 MMOL/L — SIGNIFICANT CHANGE UP (ref 5–17)
AST SERPL-CCNC: 250 U/L — HIGH (ref 10–40)
AST SERPL-CCNC: 283 U/L — HIGH (ref 10–40)
BASOPHILS # BLD AUTO: 0.01 K/UL — SIGNIFICANT CHANGE UP (ref 0–0.2)
BASOPHILS NFR BLD AUTO: 0.1 % — SIGNIFICANT CHANGE UP (ref 0–2)
BILIRUB SERPL-MCNC: 0.7 MG/DL — SIGNIFICANT CHANGE UP (ref 0.2–1.2)
BILIRUB SERPL-MCNC: 0.8 MG/DL — SIGNIFICANT CHANGE UP (ref 0.2–1.2)
BLD GP AB SCN SERPL QL: NEGATIVE — SIGNIFICANT CHANGE UP
BUN SERPL-MCNC: 25 MG/DL — HIGH (ref 7–23)
BUN SERPL-MCNC: 26 MG/DL — HIGH (ref 7–23)
CALCIUM SERPL-MCNC: 8.4 MG/DL — SIGNIFICANT CHANGE UP (ref 8.4–10.5)
CALCIUM SERPL-MCNC: 8.7 MG/DL — SIGNIFICANT CHANGE UP (ref 8.4–10.5)
CHLORIDE SERPL-SCNC: 105 MMOL/L — SIGNIFICANT CHANGE UP (ref 96–108)
CHLORIDE SERPL-SCNC: 108 MMOL/L — SIGNIFICANT CHANGE UP (ref 96–108)
CK MB CFR SERPL CALC: 30.3 NG/ML — HIGH (ref 0–6.7)
CK SERPL-CCNC: 9377 U/L — HIGH (ref 30–200)
CO2 SERPL-SCNC: 22 MMOL/L — SIGNIFICANT CHANGE UP (ref 22–31)
CO2 SERPL-SCNC: 25 MMOL/L — SIGNIFICANT CHANGE UP (ref 22–31)
CREAT SERPL-MCNC: 0.67 MG/DL — SIGNIFICANT CHANGE UP (ref 0.5–1.3)
CREAT SERPL-MCNC: 0.72 MG/DL — SIGNIFICANT CHANGE UP (ref 0.5–1.3)
CRP SERPL-MCNC: 67.6 MG/L — HIGH (ref 0–4)
D DIMER BLD IA.RAPID-MCNC: 771 NG/ML DDU — HIGH
EOSINOPHIL # BLD AUTO: 0 K/UL — SIGNIFICANT CHANGE UP (ref 0–0.5)
EOSINOPHIL NFR BLD AUTO: 0 % — SIGNIFICANT CHANGE UP (ref 0–6)
ESTIMATED AVERAGE GLUCOSE: 117 MG/DL — HIGH (ref 68–114)
FERRITIN SERPL-MCNC: 569 NG/ML — HIGH (ref 30–400)
GLUCOSE BLDC GLUCOMTR-MCNC: 106 MG/DL — HIGH (ref 70–99)
GLUCOSE BLDC GLUCOMTR-MCNC: 111 MG/DL — HIGH (ref 70–99)
GLUCOSE BLDC GLUCOMTR-MCNC: 81 MG/DL — SIGNIFICANT CHANGE UP (ref 70–99)
GLUCOSE BLDC GLUCOMTR-MCNC: 89 MG/DL — SIGNIFICANT CHANGE UP (ref 70–99)
GLUCOSE SERPL-MCNC: 91 MG/DL — SIGNIFICANT CHANGE UP (ref 70–99)
GLUCOSE SERPL-MCNC: 93 MG/DL — SIGNIFICANT CHANGE UP (ref 70–99)
HAV IGM SER-ACNC: SIGNIFICANT CHANGE UP
HBV CORE IGM SER-ACNC: SIGNIFICANT CHANGE UP
HBV SURFACE AG SER-ACNC: SIGNIFICANT CHANGE UP
HCT VFR BLD CALC: 44.5 % — SIGNIFICANT CHANGE UP (ref 39–50)
HCV AB S/CO SERPL IA: 0.11 S/CO — SIGNIFICANT CHANGE UP (ref 0–0.99)
HCV AB SERPL-IMP: SIGNIFICANT CHANGE UP
HGB BLD-MCNC: 15.1 G/DL — SIGNIFICANT CHANGE UP (ref 13–17)
IMM GRANULOCYTES NFR BLD AUTO: 0.6 % — SIGNIFICANT CHANGE UP (ref 0–1.5)
LYMPHOCYTES # BLD AUTO: 0.7 K/UL — LOW (ref 1–3.3)
LYMPHOCYTES # BLD AUTO: 9.7 % — LOW (ref 13–44)
MAGNESIUM SERPL-MCNC: 2.3 MG/DL — SIGNIFICANT CHANGE UP (ref 1.6–2.6)
MCHC RBC-ENTMCNC: 30.1 PG — SIGNIFICANT CHANGE UP (ref 27–34)
MCHC RBC-ENTMCNC: 33.9 GM/DL — SIGNIFICANT CHANGE UP (ref 32–36)
MCV RBC AUTO: 88.8 FL — SIGNIFICANT CHANGE UP (ref 80–100)
MONOCYTES # BLD AUTO: 0.37 K/UL — SIGNIFICANT CHANGE UP (ref 0–0.9)
MONOCYTES NFR BLD AUTO: 5.1 % — SIGNIFICANT CHANGE UP (ref 2–14)
NEUTROPHILS # BLD AUTO: 6.12 K/UL — SIGNIFICANT CHANGE UP (ref 1.8–7.4)
NEUTROPHILS NFR BLD AUTO: 84.5 % — HIGH (ref 43–77)
NRBC # BLD: 0 /100 WBCS — SIGNIFICANT CHANGE UP (ref 0–0)
PHOSPHATE SERPL-MCNC: 1.9 MG/DL — LOW (ref 2.5–4.5)
PLATELET # BLD AUTO: 120 K/UL — LOW (ref 150–400)
POTASSIUM SERPL-MCNC: 3.3 MMOL/L — LOW (ref 3.5–5.3)
POTASSIUM SERPL-MCNC: 3.7 MMOL/L — SIGNIFICANT CHANGE UP (ref 3.5–5.3)
POTASSIUM SERPL-SCNC: 3.3 MMOL/L — LOW (ref 3.5–5.3)
POTASSIUM SERPL-SCNC: 3.7 MMOL/L — SIGNIFICANT CHANGE UP (ref 3.5–5.3)
PROT SERPL-MCNC: 5.6 G/DL — LOW (ref 6–8.3)
PROT SERPL-MCNC: 6.3 G/DL — SIGNIFICANT CHANGE UP (ref 6–8.3)
RBC # BLD: 5.01 M/UL — SIGNIFICANT CHANGE UP (ref 4.2–5.8)
RBC # FLD: 13.9 % — SIGNIFICANT CHANGE UP (ref 10.3–14.5)
RH IG SCN BLD-IMP: POSITIVE — SIGNIFICANT CHANGE UP
SODIUM SERPL-SCNC: 139 MMOL/L — SIGNIFICANT CHANGE UP (ref 135–145)
SODIUM SERPL-SCNC: 142 MMOL/L — SIGNIFICANT CHANGE UP (ref 135–145)
TROPONIN T SERPL-MCNC: 0.01 NG/ML — SIGNIFICANT CHANGE UP (ref 0–0.01)
WBC # BLD: 7.24 K/UL — SIGNIFICANT CHANGE UP (ref 3.8–10.5)
WBC # FLD AUTO: 7.24 K/UL — SIGNIFICANT CHANGE UP (ref 3.8–10.5)

## 2021-11-03 PROCEDURE — 36000 PLACE NEEDLE IN VEIN: CPT

## 2021-11-03 PROCEDURE — 99223 1ST HOSP IP/OBS HIGH 75: CPT | Mod: GC

## 2021-11-03 PROCEDURE — 99233 SBSQ HOSP IP/OBS HIGH 50: CPT | Mod: GC

## 2021-11-03 PROCEDURE — 76937 US GUIDE VASCULAR ACCESS: CPT | Mod: 26

## 2021-11-03 RX ORDER — ACETAMINOPHEN 500 MG
1000 TABLET ORAL ONCE
Refills: 0 | Status: COMPLETED | OUTPATIENT
Start: 2021-11-03 | End: 2021-11-03

## 2021-11-03 RX ORDER — INSULIN LISPRO 100/ML
VIAL (ML) SUBCUTANEOUS AT BEDTIME
Refills: 0 | Status: DISCONTINUED | OUTPATIENT
Start: 2021-11-03 | End: 2021-11-03

## 2021-11-03 RX ORDER — ENOXAPARIN SODIUM 100 MG/ML
40 INJECTION SUBCUTANEOUS EVERY 24 HOURS
Refills: 0 | Status: DISCONTINUED | OUTPATIENT
Start: 2021-11-04 | End: 2021-11-10

## 2021-11-03 RX ORDER — DEXAMETHASONE 0.5 MG/5ML
6 ELIXIR ORAL EVERY 24 HOURS
Refills: 0 | Status: DISCONTINUED | OUTPATIENT
Start: 2021-11-03 | End: 2021-11-06

## 2021-11-03 RX ORDER — ENOXAPARIN SODIUM 100 MG/ML
80 INJECTION SUBCUTANEOUS EVERY 12 HOURS
Refills: 0 | Status: DISCONTINUED | OUTPATIENT
Start: 2021-11-03 | End: 2021-11-03

## 2021-11-03 RX ORDER — ACETAMINOPHEN 500 MG
650 TABLET ORAL EVERY 6 HOURS
Refills: 0 | Status: DISCONTINUED | OUTPATIENT
Start: 2021-11-03 | End: 2021-11-18

## 2021-11-03 RX ORDER — INSULIN LISPRO 100/ML
VIAL (ML) SUBCUTANEOUS EVERY 6 HOURS
Refills: 0 | Status: DISCONTINUED | OUTPATIENT
Start: 2021-11-03 | End: 2021-11-18

## 2021-11-03 RX ORDER — DIAZEPAM 5 MG
1 TABLET ORAL
Qty: 0 | Refills: 0 | DISCHARGE

## 2021-11-03 RX ORDER — POTASSIUM PHOSPHATE, MONOBASIC POTASSIUM PHOSPHATE, DIBASIC 236; 224 MG/ML; MG/ML
30 INJECTION, SOLUTION INTRAVENOUS ONCE
Refills: 0 | Status: COMPLETED | OUTPATIENT
Start: 2021-11-03 | End: 2021-11-03

## 2021-11-03 RX ORDER — DIAZEPAM 5 MG
10 TABLET ORAL DAILY
Refills: 0 | Status: DISCONTINUED | OUTPATIENT
Start: 2021-11-03 | End: 2021-11-08

## 2021-11-03 RX ORDER — PANTOPRAZOLE SODIUM 20 MG/1
40 TABLET, DELAYED RELEASE ORAL EVERY 24 HOURS
Refills: 0 | Status: DISCONTINUED | OUTPATIENT
Start: 2021-11-03 | End: 2021-11-11

## 2021-11-03 RX ADMIN — Medication 1000 MILLIGRAM(S): at 12:30

## 2021-11-03 RX ADMIN — Medication 2 MILLIGRAM(S): at 08:17

## 2021-11-03 RX ADMIN — PANTOPRAZOLE SODIUM 40 MILLIGRAM(S): 20 TABLET, DELAYED RELEASE ORAL at 06:02

## 2021-11-03 RX ADMIN — Medication 10 MILLIGRAM(S): at 19:11

## 2021-11-03 RX ADMIN — ENOXAPARIN SODIUM 80 MILLIGRAM(S): 100 INJECTION SUBCUTANEOUS at 08:17

## 2021-11-03 RX ADMIN — Medication 6 MILLIGRAM(S): at 14:05

## 2021-11-03 RX ADMIN — POTASSIUM PHOSPHATE, MONOBASIC POTASSIUM PHOSPHATE, DIBASIC 83.33 MILLIMOLE(S): 236; 224 INJECTION, SOLUTION INTRAVENOUS at 06:02

## 2021-11-03 RX ADMIN — Medication 400 MILLIGRAM(S): at 11:26

## 2021-11-03 NOTE — PROGRESS NOTE ADULT - PROBLEM SELECTOR PLAN 2
CXR consistent with covid-pna. Pt unvaccinated. s/p one dose decadron in ED. Not a candidate for Remdesivir given transaminitis which remain high.     Plan:  -c/w decadron 6mg IV daily for 10 day course (11/2 - )  -c/w low dose insulin sliding scale and pantoprazole daily while on steroids   -c/w covid droplet and airborne precautions  -c/w oxygen supplmentation Patient COVID positive, CXR consistent with covid-pna, patient is unvaccinated.     Plan:  - c/w decadron 6mg IV daily for 10 day course (11/2 - )  - c/w low dose insulin sliding scale and pantoprazole daily while on steroids   - Start Remdesivir once transaminase levels improve   - c/w covid droplet and airborne precautions  - c/w oxygen supplmentation

## 2021-11-03 NOTE — PROGRESS NOTE ADULT - SUBJECTIVE AND OBJECTIVE BOX
Patient is a 71y old  Male who presents with a chief complaint of Acute hypoxic respiratory failure 2/2 covid pna. Patient in no acute distress this morning.  He's awake, alert, answering questions, following commands.  He does not report any nausea, vomiting, fever, chills, chest pain, shortness of breath, abdominal pain, diarrhea, constipation, dysuria.       INTERVAL HPI/OVERNIGHT EVENTS:   No overnight events   Afebrile, hemodynamically stable     ICU Vital Signs Last 24 Hrs  T(C): 36.6 (2021 16:50), Max: 39.4 (2021 08:53)  T(F): 97.9 (2021 16:50), Max: 102.9 (2021 08:53)  HR: 77 (2021 16:50) (77 - 102)  BP: 134/93 (2021 16:50) (121/83 - 157/84)  BP(mean): 109 (2021 16:50) (93 - 113)  ABP: --  ABP(mean): --  RR: 20 (2021 16:50) (16 - 22)  SpO2: 93% (2021 16:50) (81% - 96%)    I&O's Summary    2021 07:01  -  2021 07:00  --------------------------------------------------------  IN: 0 mL / OUT: 325 mL / NET: -325 mL    2021 07:01  -  2021 17:09  --------------------------------------------------------  IN: 0 mL / OUT: 500 mL / NET: -500 mL          LABS:                        15.1   7.24  )-----------( 120      ( 2021 03:39 )             44.5     11-03    142  |  108  |  25<H>  ----------------------------<  91  3.3<L>   |  22  |  0.72    Ca    8.4      2021 12:46  Phos  1.9       Mg     2.3         TPro  5.6<L>  /  Alb  2.8<L>  /  TBili  0.8  /  DBili  x   /  AST  250<H>  /  ALT  71<H>  /  AlkPhos  48        Urinalysis Basic - ( 2021 12:37 )    Color: Yellow / Appearance: Clear / S.020 / pH: x  Gluc: x / Ketone: Negative  / Bili: Negative / Urobili: 4   Blood: x / Protein: 100 / Nitrite: Negative   Leuk Esterase: Negative / RBC: 2-5 /HPF / WBC 3-5 /HPF   Sq Epi: x / Non Sq Epi: Occasional /HPF / Bacteria: Trace /HPF      CAPILLARY BLOOD GLUCOSE      POCT Blood Glucose.: 106 mg/dL (2021 16:47)  POCT Blood Glucose.: 89 mg/dL (2021 11:31)  POCT Blood Glucose.: 81 mg/dL (2021 06:08)    ABG - ( 2021 18:37 )  pH, Arterial: 7.51  pH, Blood: x     /  pCO2: 37    /  pO2: 72    / HCO3: 30    / Base Excess: 6.3   /  SaO2: 96                  RADIOLOGY & ADDITIONAL TESTS:    Consultant(s) Notes Reviewed:  [x ] YES  [ ] NO    MEDICATIONS  (STANDING):  dexAMETHasone  Injectable 6 milliGRAM(s) IV Push every 24 hours  diazepam    Tablet 10 milliGRAM(s) Oral daily  insulin lispro (ADMELOG) corrective regimen sliding scale   SubCutaneous every 6 hours  pantoprazole  Injectable 40 milliGRAM(s) IV Push every 24 hours    MEDICATIONS  (PRN):  acetaminophen     Tablet .. 650 milliGRAM(s) Oral every 6 hours PRN Temp greater or equal to 38C (100.4F), Mild Pain (1 - 3)  LORazepam   Injectable 2 milliGRAM(s) IV Push every 2 hours PRN Symptom-triggered: 2 point increase in CIWA -Ar score and a total score of 7 or LESS      PHYSICAL EXAM:  GENERAL:   HEAD:  Atraumatic, Normocephalic  EYES: EOMI, PERRLA, conjunctiva and sclera clear  NECK: Supple, No JVD, Normal thyroid, no enlarged nodes  NERVOUS SYSTEM:  Alert & Awake.   CHEST/LUNG: B/L good air entry; No rales, rhonchi, or wheezing  HEART: S1S2 normal, no S3, Regular rate and rhythm; No murmurs  ABDOMEN: Soft, Nontender, Nondistended; Bowel sounds present  EXTREMITIES:  2+ Peripheral Pulses, No clubbing, cyanosis, or edema  LYMPH: No lymphadenopathy noted  SKIN: No rashes or lesions    Care Discussed with Consultants/Other Providers [ x] YES  [ ] NO Patient is a 71y old  Male who presents with a chief complaint of Acute hypoxic respiratory failure 2/2 covid pna. Patient in no acute distress this morning.  He's awake, alert, answering questions, following commands.  He does not report any nausea, vomiting, fever, chills, chest pain, shortness of breath, abdominal pain, diarrhea, constipation, dysuria.       INTERVAL HPI/OVERNIGHT EVENTS:   No overnight events   Afebrile, hemodynamically stable     ICU Vital Signs Last 24 Hrs  T(C): 36.6 (2021 16:50), Max: 39.4 (2021 08:53)  T(F): 97.9 (2021 16:50), Max: 102.9 (2021 08:53)  HR: 77 (2021 16:50) (77 - 102)  BP: 134/93 (2021 16:50) (121/83 - 157/84)  BP(mean): 109 (2021 16:50) (93 - 113)  ABP: --  ABP(mean): --  RR: 20 (2021 16:50) (16 - 22)  SpO2: 93% (2021 16:50) (81% - 96%)    I&O's Summary    2021 07:01  -  2021 07:00  --------------------------------------------------------  IN: 0 mL / OUT: 325 mL / NET: -325 mL    2021 07:01  -  2021 17:09  --------------------------------------------------------  IN: 0 mL / OUT: 500 mL / NET: -500 mL      LABS:                        15.1   7.24  )-----------( 120      ( 2021 03:39 )             44.5     11-03    142  |  108  |  25<H>  ----------------------------<  91  3.3<L>   |  22  |  0.72    Ca    8.4      2021 12:46  Phos  1.9       Mg     2.3         TPro  5.6<L>  /  Alb  2.8<L>  /  TBili  0.8  /  DBili  x   /  AST  250<H>  /  ALT  71<H>  /  AlkPhos  48        Urinalysis Basic - ( 2021 12:37 )    Color: Yellow / Appearance: Clear / S.020 / pH: x  Gluc: x / Ketone: Negative  / Bili: Negative / Urobili: 4   Blood: x / Protein: 100 / Nitrite: Negative   Leuk Esterase: Negative / RBC: 2-5 /HPF / WBC 3-5 /HPF   Sq Epi: x / Non Sq Epi: Occasional /HPF / Bacteria: Trace /HPF      CAPILLARY BLOOD GLUCOSE      POCT Blood Glucose.: 106 mg/dL (2021 16:47)  POCT Blood Glucose.: 89 mg/dL (2021 11:31)  POCT Blood Glucose.: 81 mg/dL (2021 06:08)    ABG - ( 2021 18:37 )  pH, Arterial: 7.51  pH, Blood: x     /  pCO2: 37    /  pO2: 72    / HCO3: 30    / Base Excess: 6.3   /  SaO2: 96          RADIOLOGY & ADDITIONAL TESTS:    Consultant(s) Notes Reviewed:  [x ] YES  [ ] NO    MEDICATIONS  (STANDING):  dexAMETHasone  Injectable 6 milliGRAM(s) IV Push every 24 hours  diazepam    Tablet 10 milliGRAM(s) Oral daily  insulin lispro (ADMELOG) corrective regimen sliding scale   SubCutaneous every 6 hours  pantoprazole  Injectable 40 milliGRAM(s) IV Push every 24 hours    MEDICATIONS  (PRN):  acetaminophen     Tablet .. 650 milliGRAM(s) Oral every 6 hours PRN Temp greater or equal to 38C (100.4F), Mild Pain (1 - 3)  LORazepam   Injectable 2 milliGRAM(s) IV Push every 2 hours PRN Symptom-triggered: 2 point increase in CIWA -Ar score and a total score of 7 or LESS      PHYSICAL EXAM:  GENERAL: Resting comfortably, no acute distress  HEAD:  Atraumatic, Normocephalic  EYES: EOMI, PERRLA, conjunctiva and sclera clear  NECK: Supple, No JVD, Normal thyroid, no enlarged nodes  NERVOUS SYSTEM:  Alert & Awake.   CHEST/LUNG: B/L good air entry; No rales, rhonchi, or wheezing, no increased work of breathing  HEART: S1S2 normal, no S3, Regular rate and rhythm; No murmurs  ABDOMEN: Soft, Nontender, Nondistended; Bowel sounds present  EXTREMITIES:  2+ Peripheral Pulses, No clubbing, cyanosis, or edema  LYMPH: No lymphadenopathy noted  SKIN: No rashes or lesions    Care Discussed with Consultants/Other Providers [ x] YES  [ ] NO

## 2021-11-03 NOTE — PROGRESS NOTE ADULT - PROBLEM SELECTOR PLAN 5
Trop-I 247 then 244 after 6 hrs at [place]. EKG at [place] reportedly showed RBBB. Trop T 0.01.    - no intervention Trop-I 247 then 244 after 6 hrs at [place]. EKG at [place] reportedly showed RBBB. Trop T 0.01.  Patient without complaint of chest pain.  Low suspicion for ACS.   - No intervention at this time

## 2021-11-03 NOTE — PROGRESS NOTE ADULT - PROBLEM SELECTOR PLAN 6
Likely i/s/o covid pna. Pt is a poor historian, so it's unclear if he lost consciousness.  CH negative for bleed.   - no interventions Likely i/s/o covid pna. Pt is a poor historian, so it's unclear if he lost consciousness. CTH negative for bleed.   - Fall precautions  - PT once patient improves

## 2021-11-03 NOTE — H&P ADULT - ATTENDING COMMENTS
This patient was transferred from [facility], was evaluated with the house staff.  An elderly male with worsening mental status, was found on the floor, was hypoxemic requiring O2 supplementation and is covid (+), trop elevated, DDD elevated, (+)RBBB (unknown age), CT head with no acute findings, CXR with bilateral opacities, was given decadron.  -AMS  -PNA, Covid (+)  -RBBB  -elevated high sensitivity trop  -hypokalemia  -rhabdomyolysis.  -C/w O2 NC, may have to escalate to HFNC later, c/w decadron.  please see the resident's note for the details.

## 2021-11-03 NOTE — PROGRESS NOTE ADULT - PROBLEM SELECTOR PLAN 10
Pt reportedly on hydrocodone and diazepam at home.  Home valium restarted.   - no acute interventions

## 2021-11-03 NOTE — H&P ADULT - PROBLEM SELECTOR PLAN 11
F: NS at 150 ml/hr   E: Replete PRN  N: NPO pending S&S    DVT: Full dose lovenox 80mg BID    Code: Full Pt reportedly on mirtazapine     Plan:  -med rec in AM and restart

## 2021-11-03 NOTE — H&P ADULT - PROBLEM SELECTOR PLAN 8
Pt possibly on hydrochlorothiazide, amlodipine and losartan (per outpatient med review). Doses unknown.    Plan:  -Med rec in AM and restart as necessary Pt with elevated AST and ALT at admission  2/2 covid vs rhabdo vs unknown history    Plan:  -Obtain further medical hx from family and/or PCP  -Continue to trend AST/ALT  -Consider hep panel

## 2021-11-03 NOTE — PROGRESS NOTE ADULT - PROBLEM SELECTOR PLAN 1
AHRF likely due to COVID pneumonia.  Patient was on 5L NC with worsening respiratory status now on high flow. CXR: infiltration in mid/lower left lung field and milder amount in right lung field, consistent with covid pna. No leukocytosis, no bands, no fever.  Superimposed bacterial pna less likely given no fever, no wbc.  -c/w HFNC, if patient desaturates BIPAP and escalate AHRF likely due to COVID pneumonia.  Patient was on 5L NC with worsening respiratory status now on high flow. CXR: infiltration in mid/lower left lung field and milder amount in right lung field, consistent with covid pna. No leukocytosis, no bands, no fever.  Superimposed bacterial pna less likely given no fever, no wbc.  - c/w HFNC, if patient desaturates BIPAP and escalate, wean O2 as tolerated  - Treat COVID as below

## 2021-11-03 NOTE — H&P ADULT - PROBLEM SELECTOR PLAN 3
D-dimer 988  Pt's family refused CT PE at [place]    Plan:  -c/w lovenox 80mg BID (full dose)  -f/u TTE to assess for right heart strain  -Consider CT PE vs V/Q scan   -f/u repeat D-Dimer

## 2021-11-03 NOTE — PROGRESS NOTE ADULT - PROBLEM SELECTOR PLAN 9
Pt possibly on hydrochlorothiazide, amlodipine and losartan (per outpatient med review).  BP acceptable, continue to hold meds.   - Continue to monitor

## 2021-11-03 NOTE — H&P ADULT - HISTORY OF PRESENT ILLNESS
71 year old male with PMx of HTN, depression, chronic back pain, transferred from [facility], where he was admitted on 11/2 after an unwitnessed fall (down for >1 day) and found to have acute hypoxic respiratory failure secondary to COVID-19 pneumonia and altered mental status. Pt is currently a poor historian, but per family, his mental status has been worsening in the past few days and he had an unwitnessed fall coming from the bathroom where he fell and remained on the floor for >1 day. He lives at home with his wife and normally ambulates independently. He is not vaccinated for covid.    Patient complained of overall weakness, but denied chest pain, SOB, fevers, diarrhea/constipation, LE pain/swelling. Virgen in place.    ED Course (at [place]):  Vitals: /91, HR 76/min, RR 16/min, 93% on RA -> 95% on 5 L NC  EKG Right bundle branch block  Abnormal Labs: Covid+. Plt 127, D-dimers 988, Trop-I 247 then 244 after 6 hrs, K 3.0, Mg 2.8, BUN 38, Alb 2.6, , [place] 96, [place] [address] then 9896 after 6 hrs, pH 7.51, pCO2 37, pO2 72, HCO3 30, Utox positive for BZD (home med). UA dirty.  Imaging: CXR: infiltration in mid/lower left lung field and milder amount in right lung field. Consistent with covid pna. CTH: No hydrocephalus, acute intracranial hemorrhage, mass effect, or brain edema.  Interventions: 300 ASA rectal, Lovenox 80mg x1, 2 L NS then 150 ml/hr for 6 hrs, Decadron 6mg IV, Potassium 10mg IV x3.      **Note on home meds: As per pt's daughter Manuela, pt takes Hydrocodone, Diazepam, Mirtazapine at home due to "back surgeries/back problems and depression." She does not know the full list or the doses. Pt also on unknown anti-hypertensives. Will call pharmacy in AM.

## 2021-11-03 NOTE — H&P ADULT - PROBLEM SELECTOR PLAN 6
CK ~10,000 at [place].  s/p ~2.5 L NS    Plan:  -c/w  ml/hr for 12 hours  -continue to trend CK and renal function  -f/u UA (UA at [place] was dirty) Likely i/s/o covid pna. Pt is a poor historian, so it's unclear if he lost consciousness.     Plan:  -Obtain further collateral from family  -Considering ordering further fall workup depending on clinical status and collateral info

## 2021-11-03 NOTE — H&P ADULT - PROBLEM SELECTOR PLAN 1
Requiring 5 L NC currently. Not complaining of SOB. Exam with mild crackles in the lower lobes.  CXR: infiltration in mid/lower left lung field and milder amount in right lung field. Consistent with covid pna.   COVID-19 PCR positive.  No leukocytosis, no bands, no fever.   D-dimer 988.  PE with COVID PNA a possibility. Superimposed bacterial pna less likely given no fever, no wbc.    Plan:  -c/w O2 via nasal canula and wean as tolerated  -treat/workup pna and possible PE as below

## 2021-11-03 NOTE — H&P ADULT - PROBLEM SELECTOR PLAN 10
Pt reportedly on mirtazapine     Plan:  -med rec in AM and restart Pt reportedly on hydrocodone and diazepam at home    Plan:  -Med rec in AM and restart at lower doses considering risk of withdrawal but also AMS

## 2021-11-03 NOTE — PROGRESS NOTE ADULT - PROBLEM SELECTOR PLAN 8
Pt with elevated AST and ALT at admission likely 2/2 covid vs rhabdo vs unknown history.    - Continue to trend Pt with elevated AST and ALT at admission likely 2/2 covid vs rhabdo.   - Continue to trend

## 2021-11-03 NOTE — PROGRESS NOTE ADULT - ASSESSMENT
71 year old male with PMx of HTN, depression, chronic back pain, transferred from [facility], admitted for acute hypoxic respiratory failure secondary to COVID-19 pneumonia, altered mental status, and recent fall.

## 2021-11-03 NOTE — H&P ADULT - PROBLEM SELECTOR PLAN 7
Pt with elevated AST and ALT at admission  2/2 covid vs rhabdo vs unknown history    Plan:  -Obtain further medical hx from family and/or PCP  -Continue to trend AST/ALT  -Consider hep panel CK ~10,000 at [place].  s/p ~2.5 L NS    Plan:  -c/w  ml/hr for 12 hours  -continue to trend CK and renal function  -f/u UA (UA at [place] was dirty)

## 2021-11-03 NOTE — H&P ADULT - NSHPREVIEWOFSYSTEMS_GEN_ALL_CORE
CONSTITUTIONAL: +weakness, no fevers or chills  EYES/ENT: No visual changes;  No vertigo or throat pain   NECK: No pain or stiffness  RESPIRATORY: No cough, wheezing, hemoptysis; No shortness of breath  CARDIOVASCULAR: No chest pain or palpitations  GASTROINTESTINAL: No abdominal or epigastric pain. No nausea, vomiting, or hematemesis; No diarrhea or constipation. No melena or hematochezia.  GENITOURINARY: No dysuria, frequency or hematuria  NEUROLOGICAL: No numbness or weakness  SKIN: No itching, rashes

## 2021-11-03 NOTE — PROGRESS NOTE ADULT - PROBLEM SELECTOR PLAN 4
AOx1-2, lethargic.  Per discussion with wife baseline is A&Ox3.  encephalopathy like in setting of covid pna.  - Treat COVID as above Patient AOx1-2, lethargic, encephalopathy like in setting of COVID pneumonia. As per discussion with wife baseline is A&Ox3.    - Treat COVID as above

## 2021-11-03 NOTE — PROGRESS NOTE ADULT - PROBLEM SELECTOR PLAN 3
Patients symptoms are consistent with COVID pneumonia.  D-dimer 988 likely elevated due to COVID.  Pt's family refused CT PE at [place].  Low suspicion for PE.    - Treat COVID as above Patients symptoms are consistent with COVID pneumonia.  D-dimer 988 likely elevated due to COVID.  Pt's family refused CT PE at [place]. Currently low suspicion for PE.    - Continue to monitor and treat COVID as above

## 2021-11-03 NOTE — PROGRESS NOTE ADULT - PROBLEM SELECTOR PLAN 7
CK ~10,000 at [place]. s/p ~2.5 L NS.  Currently holding fluids in setting of COVID pneumonia.    - Monitor Scr and consider restarting fluids as needed CK ~10,000 at [place]. Improving after ~2.5 L NS in ED.  Currently holding fluids in setting of COVID pneumonia.    - Monitor Scr and consider restarting fluids as needed

## 2021-11-03 NOTE — H&P ADULT - PROBLEM SELECTOR PLAN 12
F: NS at 150 ml/hr   E: Replete PRN  N: NPO pending S&S    DVT: Full dose lovenox 80mg BID    Code: Full

## 2021-11-03 NOTE — H&P ADULT - PROBLEM SELECTOR PLAN 5
Likely i/s/o covid pna. Pt is a poor historian, so it's unclear if he lost consciousness.     Plan:  -Obtain further collateral from family  -Considering ordering further fall workup depending on clinical status and collateral info Trop-I 247 then 244 after 6 hrs at [place]  EKG at [place] reportedly showed RBBB  Pt asymptomatic     Plan:  -f/u repeat EKG  -f/u cardiac enzymes

## 2021-11-03 NOTE — H&P ADULT - PROBLEM SELECTOR PLAN 4
Trop-I 247 then 244 after 6 hrs at [place]  EKG at [place] reportedly showed RBBB  Pt asymptomatic     Plan:  -f/u repeat EKG  -f/u cardiac enzymes AOx1-2, lethargic.   Likely 2/2 covid pna    Plan:  -Obtain collateral from family on baseline mental status

## 2021-11-03 NOTE — H&P ADULT - NSHPPHYSICALEXAM_GEN_ALL_CORE
GENERAL: NAD, lying in bed comfortably  HEAD:  Atraumatic, Normocephalic  EYES: EOMI, PERRLA, conjunctiva and sclera clear  ENT: Moist mucous membranes  NECK: Supple, No JVD  CHEST/LUNG: Crackles in b/l lower lobes. No wheeze. Unlabored respirations. Currently requiring 5 L NC.  HEART: Regular rate and rhythm; No murmurs, rubs, or gallops  ABDOMEN: BSx4; Soft, nontender, nondistended. Obese.  EXTREMITIES:  2+ Peripheral Pulses, brisk capillary refill. No clubbing, cyanosis, or edema. Bruises on b/l knees.   NERVOUS SYSTEM:  A&Ox1, no focal deficits   SKIN: No rashes or lesions

## 2021-11-03 NOTE — H&P ADULT - ASSESSMENT
71 year old male with PMx of HTN, depression, chronic back pain, transferred from [facility], admitted for acute hypoxic respiratory failure secondary to COVID-19 pneumonia, altered mental status, and recent fall.

## 2021-11-03 NOTE — H&P ADULT - PROBLEM SELECTOR PLAN 2
CXR consistent with covid-pna. Pt unvaccinated.   s/p one dose decadron in ED  Not a candidate for Remdesivir given transaminitis    Plan:  -c/w decadron 6mg IV daily for 10 day course (11/2 - )  -c/w covid droplet and airborne precautions  -c/w oxygen by NC as needed CXR consistent with covid-pna. Pt unvaccinated.   s/p one dose decadron in ED  Not a candidate for Remdesivir given transaminitis    Plan:  -c/w decadron 6mg IV daily for 10 day course (11/2 - )  -c/w low dose insulin sliding scale and pantoprazole daily while on steroids   -c/w covid droplet and airborne precautions  -c/w oxygen by NC as needed

## 2021-11-03 NOTE — H&P ADULT - PROBLEM SELECTOR PROBLEM 2
[facility]  Physical Therapy Initial Evaluation    Date: 11/2/2020   Name: Brittny Urias  Clinic Number: [id number]    Therapy Diagnosis:   Encounter Diagnoses   Name Primary?    Preop testing     Acquired pronation deformity of foot, left     Posterior tibial tendon dysfunction (PTTD) of left lower extremity     Pain in left foot      Physician: Uriah Veras DPM    Physician Orders: PT Eval and Treat; gait training nonweightbearing to left foot  Medical Diagnosis from Referral:   Z01.818 (ICD-10-CM) - Preop testing   M21.6X2 (ICD-10-CM) - Acquired pronation deformity of foot, left   M76.822 (ICD-10-CM) - Posterior tibial tendon dysfunction (PTTD) of left lower extremity   Evaluation Date: 11/2/2020  Authorization Period Expiration: 10/22/2021  Plan of Care Expiration: 11/2/2020  Visit # / Visits authorized: 1/ 1    Time In: 3:00 pm  Time Out: 3:45 pm  Total Appointment Time (timed & untimed codes): 45 minutes    Precautions: Standard    Subjective   Date of onset: chronic  History of current condition - Brittny reports: history of bilateral ankle/foot pain.  She reports having increase in swelling and left foot pain shortly after she began walking about 3 miles 4-5 times a week in March of this year.  Pt complains of left heel pain that is worse in morning.  Pt is scheduled for surgery 12/2/2020 and has been referred to therapy for gait training, NWB on left LE, using knee scooter and bilateral axillary crutches.     Medical History:   Past Medical History:   Diagnosis Date    Breast cancer, right breast 11/12/2018    Cancer     Hypertension     Psoriasis     on biologic for 1 year, has been controlling it well       Surgical History:   Brittny Urias  has a past surgical history that includes Hysterectomy; Dilation and curettage of uterus; Reconstruction of breast with deep inferior epigastric artery  (MEGAN) free flap (Bilateral, 11/14/2018);  Mastectomy (Bilateral, 11/14/2018); Mastectomy with sentinel node biopsy and axillary lymph node dissection (Right, 11/14/2018); and Colonoscopy (N/A, 10/10/2019).    Medications:   Brittny has a current medication list which includes the following prescription(s): betamethasone dipropionate, cholecalciferol (vitamin d3), clobetasol, clobetasol, hydrochlorothiazide, letrozole, losartan-hydrochlorothiazide 50-12.5 mg, meloxicam, metformin, skyrizi, and venlafaxine, and the following Facility-Administered Medications: EPINEPHrine 1,000 mcg in lactated Ringers 1,000 mL irrigation, EPINEPHrine 1,000 mcg in lactated Ringers 1,000 mL irrigation, and sodium chloride 0.9%.    Allergies:   Review of patient's allergies indicates:  No Known Allergies     Imaging, MRI studies: 9/11/2020  Impression:     Posterior tibialis tendinosis and tenosynovitis.     Mild tenosynovitis of the peroneus longus tendon.     Mild subcutaneous soft tissue edema overlying the medial malleolus.     Minimal cystic changes dorsal to the talonavicular joint underlying the inferior flexor retinaculum, possibly a small ganglion cyst.       Prior Therapy: yes  Social History: 2 story lives with their family  Occupation:   Prior Level of Function: independnet  Current Level of Function: independent    Pain:  Current 6/10, worst 8/10, best 3/10   Location: left foot   Description: Aching, Throbbing and Sharp  Aggravating Factors: Standing and Walking  Easing Factors: anti-inflammatory    Patients goals: learn proper use of scooter and crutches for post op weight bearing restrictions.    Objective     Observation: pt is a 51 year old female that presents to therapy in no apparent distress.  She has bilateral axillar crutches and knee scooter.  Pt wearing ankle brace on left LE.    Posture:  Pes planus bilaterally    ROM: Bilateral LE AROM is WFL    Lower Extremity Strength  Right LE  Left LE    Knee extension: 5/5 Knee extension: 5/5   Knee  flexion: 5/5 Knee flexion: 5/5   Hip flexion: 5/5 Hip flexion: 4+/5   Hip extension:  5/5 Hip extension: 5/5   Hip abduction: 4+/5 Hip abduction: 4+/5   Ankle dorsiflexion: 5/5 Ankle dorsiflexion: 4+/5   Ankle plantarflexion: 5/5 Ankle plantarflexion: 4+/5         Limitation/Restriction for FOTO Foot Survey    Therapist reviewed FOTO scores for Brittny Urias on 11/2/2020.   FOTO documents entered into EPIC - see Media section.    Limitation Score: 51%         TREATMENT   Treatment Time In: 3:40 pm  Treatment Time Out: 4:00 pm  Total Treatment time (time-based codes) separate from Evaluation: 20 minutes    Brittny participated in gait training to improve functional mobility and safety for 20  minutes, including:    - pt's knee scooter was properly adjusted and pt receives instruction on proper standing posture and usage.  Pt receives gait training with knee scooter 2 x 40 ft and is able to perform turns with proper safety.    -pt's axillary crutches were properly adjusted and she receives instruction on proper sequencing and gait mechanics for NWB ambulation.  Pt receives gait training with bilateral axillary crutches, NWB on left LE 2 x 40 ft as well as ascending and descending stairs.  .    Home Exercises and Patient Education Provided    Education provided:   - proper standing posture while using knee scooter  - gait training using crutches and scooter      Written Home Exercises Provided: yes.  Exercises were reviewed and Brittny was able to demonstrate them prior to the end of the session.  Brittny demonstrated good  understanding of the education provided.     See EMR under Patient Instructions for exercises provided 11/2/2020.    Assessment   Brittny is a 51 y.o. female referred to outpatient Physical Therapy with a medical diagnosis of pre-op left foot fusion and PTTD. Patient presents with bilateral axillary crutches and knee scooter for education and gait training for post op NWB.  Pt's scooter and  crutches were adjusted according to her height.  She has good strength in bilateral LE's and receives gait training and educated on proper use of scooter and crutches.  She also receives training for ascending/descending stairs with crutches, and issued written instructions on gait training with crutches.  Pt has some difficulty with gait training with crutches and was advised to continue practicing ambulating with crutches prior to her surgery.  No goals will be set at this time.      Patient prognosis is Good.   Patientt will benefit from skilled outpatient Physical Therapy to address the deficits stated above and in the chart below, provide patient /family education, and to maximize patientt's level of independence.     Plan of care discussed with patient: Yes  Patient's spiritual, cultural and educational needs considered and patient is agreeable to the plan of care and goals as stated below:     Anticipated Barriers for therapy: none    Medical Necessity is demonstrated by the following  History  Co-morbidities and personal factors that may impact the plan of care Co-morbidities:   high BMI, history of cancer and HTN    Personal Factors:   no deficits     low   Examination  Body Structures and Functions, activity limitations and participation restrictions that may impact the plan of care Body Regions:   lower extremities    Body Systems:    gait    Participation Restrictions:   none    Activity limitations:   Learning and applying knowledge  no deficits    General Tasks and Commands  no deficits    Communication  no deficits    Mobility  walking    Self care  no deficits    Domestic Life  no deficits    Interactions/Relationships  no deficits    Life Areas  no deficits    Community and Social Life  no deficits         low   Clinical Presentation stable and uncomplicated low   Decision Making/ Complexity Score: low     Goals:  LongTerm Goals: 1 visit  1.  Pt will be able to demonstrate proper form and technique  while ambulating NWB on left LE using knee scooter and bilateral axillary crutches.  MET        Plan   Plan of care Certification: 11/2/2020 to 11/2/2020.    Outpatient Physical Therapy 1 times weekly for 1 visit to include the following interventions: Gait Training NWB on left LE using knee scooter and bilateral axillary crutches.     Kalen Lu, PT   Pneumonia due to COVID-19 virus

## 2021-11-04 LAB
ALBUMIN SERPL ELPH-MCNC: 2.9 G/DL — LOW (ref 3.3–5)
ALP SERPL-CCNC: 56 U/L — SIGNIFICANT CHANGE UP (ref 40–120)
ALT FLD-CCNC: 75 U/L — HIGH (ref 10–45)
ANION GAP SERPL CALC-SCNC: 9 MMOL/L — SIGNIFICANT CHANGE UP (ref 5–17)
AST SERPL-CCNC: 215 U/L — HIGH (ref 10–40)
BASOPHILS # BLD AUTO: 0.01 K/UL — SIGNIFICANT CHANGE UP (ref 0–0.2)
BASOPHILS NFR BLD AUTO: 0.1 % — SIGNIFICANT CHANGE UP (ref 0–2)
BILIRUB SERPL-MCNC: 0.8 MG/DL — SIGNIFICANT CHANGE UP (ref 0.2–1.2)
BUN SERPL-MCNC: 25 MG/DL — HIGH (ref 7–23)
CALCIUM SERPL-MCNC: 9.2 MG/DL — SIGNIFICANT CHANGE UP (ref 8.4–10.5)
CHLORIDE SERPL-SCNC: 110 MMOL/L — HIGH (ref 96–108)
CK SERPL-CCNC: 3950 U/L — HIGH (ref 30–200)
CO2 SERPL-SCNC: 21 MMOL/L — LOW (ref 22–31)
CREAT SERPL-MCNC: 0.65 MG/DL — SIGNIFICANT CHANGE UP (ref 0.5–1.3)
CULTURE RESULTS: SIGNIFICANT CHANGE UP
EOSINOPHIL # BLD AUTO: 0 K/UL — SIGNIFICANT CHANGE UP (ref 0–0.5)
EOSINOPHIL NFR BLD AUTO: 0 % — SIGNIFICANT CHANGE UP (ref 0–6)
GLUCOSE BLDC GLUCOMTR-MCNC: 139 MG/DL — HIGH (ref 70–99)
GLUCOSE BLDC GLUCOMTR-MCNC: 78 MG/DL — SIGNIFICANT CHANGE UP (ref 70–99)
GLUCOSE BLDC GLUCOMTR-MCNC: 86 MG/DL — SIGNIFICANT CHANGE UP (ref 70–99)
GLUCOSE BLDC GLUCOMTR-MCNC: 89 MG/DL — SIGNIFICANT CHANGE UP (ref 70–99)
GLUCOSE SERPL-MCNC: 94 MG/DL — SIGNIFICANT CHANGE UP (ref 70–99)
HCT VFR BLD CALC: 47.2 % — SIGNIFICANT CHANGE UP (ref 39–50)
HGB BLD-MCNC: 15.7 G/DL — SIGNIFICANT CHANGE UP (ref 13–17)
IMM GRANULOCYTES NFR BLD AUTO: 0.7 % — SIGNIFICANT CHANGE UP (ref 0–1.5)
LYMPHOCYTES # BLD AUTO: 0.84 K/UL — LOW (ref 1–3.3)
LYMPHOCYTES # BLD AUTO: 7.3 % — LOW (ref 13–44)
MAGNESIUM SERPL-MCNC: 2.2 MG/DL — SIGNIFICANT CHANGE UP (ref 1.6–2.6)
MCHC RBC-ENTMCNC: 29.9 PG — SIGNIFICANT CHANGE UP (ref 27–34)
MCHC RBC-ENTMCNC: 33.3 GM/DL — SIGNIFICANT CHANGE UP (ref 32–36)
MCV RBC AUTO: 89.9 FL — SIGNIFICANT CHANGE UP (ref 80–100)
MONOCYTES # BLD AUTO: 0.44 K/UL — SIGNIFICANT CHANGE UP (ref 0–0.9)
MONOCYTES NFR BLD AUTO: 3.8 % — SIGNIFICANT CHANGE UP (ref 2–14)
NEUTROPHILS # BLD AUTO: 10.16 K/UL — HIGH (ref 1.8–7.4)
NEUTROPHILS NFR BLD AUTO: 88.1 % — HIGH (ref 43–77)
NRBC # BLD: 0 /100 WBCS — SIGNIFICANT CHANGE UP (ref 0–0)
PHOSPHATE SERPL-MCNC: 1.3 MG/DL — LOW (ref 2.5–4.5)
PLATELET # BLD AUTO: 140 K/UL — LOW (ref 150–400)
POTASSIUM SERPL-MCNC: 3.6 MMOL/L — SIGNIFICANT CHANGE UP (ref 3.5–5.3)
POTASSIUM SERPL-SCNC: 3.6 MMOL/L — SIGNIFICANT CHANGE UP (ref 3.5–5.3)
PROT SERPL-MCNC: 6.4 G/DL — SIGNIFICANT CHANGE UP (ref 6–8.3)
RBC # BLD: 5.25 M/UL — SIGNIFICANT CHANGE UP (ref 4.2–5.8)
RBC # FLD: 13.8 % — SIGNIFICANT CHANGE UP (ref 10.3–14.5)
SODIUM SERPL-SCNC: 140 MMOL/L — SIGNIFICANT CHANGE UP (ref 135–145)
SPECIMEN SOURCE: SIGNIFICANT CHANGE UP
WBC # BLD: 11.53 K/UL — HIGH (ref 3.8–10.5)
WBC # FLD AUTO: 11.53 K/UL — HIGH (ref 3.8–10.5)

## 2021-11-04 PROCEDURE — 99233 SBSQ HOSP IP/OBS HIGH 50: CPT | Mod: GC

## 2021-11-04 PROCEDURE — 71045 X-RAY EXAM CHEST 1 VIEW: CPT | Mod: 26

## 2021-11-04 PROCEDURE — 93308 TTE F-UP OR LMTD: CPT | Mod: 26

## 2021-11-04 RX ORDER — REMDESIVIR 5 MG/ML
100 INJECTION INTRAVENOUS EVERY 24 HOURS
Refills: 0 | Status: COMPLETED | OUTPATIENT
Start: 2021-11-05 | End: 2021-11-08

## 2021-11-04 RX ORDER — REMDESIVIR 5 MG/ML
200 INJECTION INTRAVENOUS EVERY 24 HOURS
Refills: 0 | Status: COMPLETED | OUTPATIENT
Start: 2021-11-04 | End: 2021-11-04

## 2021-11-04 RX ORDER — OMEPRAZOLE 10 MG/1
1 CAPSULE, DELAYED RELEASE ORAL
Qty: 0 | Refills: 0 | DISCHARGE

## 2021-11-04 RX ORDER — MIRTAZAPINE 45 MG/1
30 TABLET, ORALLY DISINTEGRATING ORAL AT BEDTIME
Refills: 0 | Status: DISCONTINUED | OUTPATIENT
Start: 2021-11-04 | End: 2021-11-15

## 2021-11-04 RX ORDER — ACETAMINOPHEN 500 MG
650 TABLET ORAL ONCE
Refills: 0 | Status: COMPLETED | OUTPATIENT
Start: 2021-11-04 | End: 2021-11-04

## 2021-11-04 RX ORDER — POTASSIUM CHLORIDE 20 MEQ
40 PACKET (EA) ORAL ONCE
Refills: 0 | Status: COMPLETED | OUTPATIENT
Start: 2021-11-04 | End: 2021-11-04

## 2021-11-04 RX ORDER — SODIUM,POTASSIUM PHOSPHATES 278-250MG
1 POWDER IN PACKET (EA) ORAL ONCE
Refills: 0 | Status: COMPLETED | OUTPATIENT
Start: 2021-11-04 | End: 2021-11-04

## 2021-11-04 RX ORDER — REMDESIVIR 5 MG/ML
INJECTION INTRAVENOUS
Refills: 0 | Status: COMPLETED | OUTPATIENT
Start: 2021-11-04 | End: 2021-11-08

## 2021-11-04 RX ADMIN — Medication 6 MILLIGRAM(S): at 16:40

## 2021-11-04 RX ADMIN — Medication 650 MILLIGRAM(S): at 22:52

## 2021-11-04 RX ADMIN — Medication 650 MILLIGRAM(S): at 17:09

## 2021-11-04 RX ADMIN — Medication 650 MILLIGRAM(S): at 07:22

## 2021-11-04 RX ADMIN — MIRTAZAPINE 30 MILLIGRAM(S): 45 TABLET, ORALLY DISINTEGRATING ORAL at 21:38

## 2021-11-04 RX ADMIN — Medication 650 MILLIGRAM(S): at 23:52

## 2021-11-04 RX ADMIN — Medication 650 MILLIGRAM(S): at 06:20

## 2021-11-04 RX ADMIN — REMDESIVIR 500 MILLIGRAM(S): 5 INJECTION INTRAVENOUS at 16:40

## 2021-11-04 RX ADMIN — Medication 1 PACKET(S): at 12:30

## 2021-11-04 RX ADMIN — Medication 40 MILLIEQUIVALENT(S): at 12:29

## 2021-11-04 RX ADMIN — ENOXAPARIN SODIUM 40 MILLIGRAM(S): 100 INJECTION SUBCUTANEOUS at 05:50

## 2021-11-04 RX ADMIN — PANTOPRAZOLE SODIUM 40 MILLIGRAM(S): 20 TABLET, DELAYED RELEASE ORAL at 05:50

## 2021-11-04 RX ADMIN — Medication 10 MILLIGRAM(S): at 12:29

## 2021-11-04 NOTE — PROGRESS NOTE ADULT - PROBLEM SELECTOR PLAN 12
F: NS at 150 ml/hr   E: Replete PRN  N: NPO pending S&S    DVT: Full dose lovenox 80mg BID    Code: Full F: S/P 2.5L NS in ED   E: Replete PRN  N: Regular diet    DVT: Lovenox     Code: Full

## 2021-11-04 NOTE — PROGRESS NOTE ADULT - PROBLEM SELECTOR PLAN 9
Pt possibly on hydrochlorothiazide, amlodipine and losartan (per outpatient med review).  BP acceptable, continue to hold meds.   - Continue to monitor Pt possibly on hydrochlorothiazide, amlodipine and losartan (per outpatient med review).  BP acceptable, continue to hold meds in setting of COVID..   - Continue to monitor

## 2021-11-04 NOTE — PROGRESS NOTE ADULT - PROBLEM SELECTOR PLAN 6
Likely i/s/o covid pna. Pt is a poor historian, so it's unclear if he lost consciousness. CTH negative for bleed.   - Fall precautions  - PT once patient improves Patient AOx2, much improved from yesterday, patient answering questions appropriately more interactive. Encephalopathy likely in setting of COVID. As per discussion with wife baseline is A&Ox3.    - Treat COVID as above

## 2021-11-04 NOTE — PROGRESS NOTE ADULT - PROBLEM SELECTOR PLAN 5
Trop-I 247 then 244 after 6 hrs at [place]. EKG at [place] reportedly showed RBBB. Trop T 0.01.  Patient without complaint of chest pain.  Low suspicion for ACS.   - No intervention at this time

## 2021-11-04 NOTE — PROGRESS NOTE ADULT - SUBJECTIVE AND OBJECTIVE BOX
Patient is a 71y old  Male who presents with a chief complaint of Acute hypoxic respiratory failure 2/2 covid pna. Patient more interactive this AM, he reports he fees tired, otherwise does not report nausea, vomiting, fever, chills, chest pain, abdominal pain, diarrhea, constipation, dysuria.       INTERVAL HPI/OVERNIGHT EVENTS:   No overnight events   Afebrile, hemodynamically stable     ICU Vital Signs Last 24 Hrs  T(C): 37.1 (04 Nov 2021 14:11), Max: 38.1 (04 Nov 2021 06:15)  T(F): 98.8 (04 Nov 2021 14:11), Max: 100.6 (04 Nov 2021 06:15)  HR: 80 (04 Nov 2021 13:06) (73 - 83)  BP: 147/83 (04 Nov 2021 13:06) (117/73 - 160/92)  BP(mean): 109 (04 Nov 2021 13:06) (90 - 120)  ABP: --  ABP(mean): --  RR: 29 (04 Nov 2021 13:06) (16 - 29)  SpO2: 90% (04 Nov 2021 13:06) (89% - 97%)    I&O's Summary    03 Nov 2021 07:01  -  04 Nov 2021 07:00  --------------------------------------------------------  IN: 0 mL / OUT: 1500 mL / NET: -1500 mL    04 Nov 2021 07:01  -  04 Nov 2021 14:27  --------------------------------------------------------  IN: 100 mL / OUT: 200 mL / NET: -100 mL    Physical exam:    GENERAL: Resting comfortably, no acute distress  HEAD:  Atraumatic, Normocephalic  EYES: EOMI, PERRLA, conjunctiva and sclera clear  NECK: Supple, No JVD, Normal thyroid, no enlarged nodes  NERVOUS SYSTEM:  Alert & Awake.   CHEST/LUNG: B/L good air entry; No rales, rhonchi, or wheezing, no increased work of breathing  HEART: S1S2 normal, no S3, Regular rate and rhythm; No murmurs  ABDOMEN: Soft, Nontender, Nondistended; Bowel sounds present  EXTREMITIES:  2+ Peripheral Pulses, No clubbing, cyanosis, or edema  LYMPH: No lymphadenopathy noted  SKIN: No rashes or lesions      LABS:                        15.7   11.53 )-----------( 140      ( 04 Nov 2021 06:37 )             47.2     11-04    140  |  110<H>  |  25<H>  ----------------------------<  94  3.6   |  21<L>  |  0.65    Ca    9.2      04 Nov 2021 06:37  Phos  1.3     11-04  Mg     2.2     11-04    TPro  6.4  /  Alb  2.9<L>  /  TBili  0.8  /  DBili  x   /  AST  215<H>  /  ALT  75<H>  /  AlkPhos  56  11-04        CAPILLARY BLOOD GLUCOSE      POCT Blood Glucose.: 89 mg/dL (04 Nov 2021 12:11)  POCT Blood Glucose.: 86 mg/dL (04 Nov 2021 06:13)  POCT Blood Glucose.: 111 mg/dL (03 Nov 2021 22:37)  POCT Blood Glucose.: 106 mg/dL (03 Nov 2021 16:47)    ABG - ( 02 Nov 2021 18:37 )  pH, Arterial: 7.51  pH, Blood: x     /  pCO2: 37    /  pO2: 72    / HCO3: 30    / Base Excess: 6.3   /  SaO2: 96                  RADIOLOGY & ADDITIONAL TESTS:    Consultant(s) Notes Reviewed:  [x ] YES  [ ] NO    MEDICATIONS  (STANDING):  dexAMETHasone  Injectable 6 milliGRAM(s) IV Push every 24 hours  diazepam    Tablet 10 milliGRAM(s) Oral daily  enoxaparin Injectable 40 milliGRAM(s) SubCutaneous every 24 hours  insulin lispro (ADMELOG) corrective regimen sliding scale   SubCutaneous every 6 hours  pantoprazole  Injectable 40 milliGRAM(s) IV Push every 24 hours  remdesivir  IVPB   IV Intermittent   remdesivir  IVPB 200 milliGRAM(s) IV Intermittent every 24 hours    MEDICATIONS  (PRN):  acetaminophen     Tablet .. 650 milliGRAM(s) Oral every 6 hours PRN Temp greater or equal to 38C (100.4F), Mild Pain (1 - 3)  LORazepam   Injectable 2 milliGRAM(s) IV Push every 2 hours PRN Symptom-triggered: 2 point increase in CIWA -Ar score and a total score of 7 or LESS      Care Discussed with Consultants/Other Providers [ x] YES  [ ] NO

## 2021-11-04 NOTE — PROGRESS NOTE ADULT - PROBLEM SELECTOR PLAN 1
AHRF likely due to COVID pneumonia.  Patient on high flow 50/70. CXR: infiltration in mid/lower left lung field and milder amount in right lung field, consistent with covid pna. No leukocytosis, no bands, no fever.  Superimposed bacterial pna less likely given no fever, no wbc.  - c/w HFNC, if patient desaturates BIPAP and escalate, wean O2 as tolerated  - Treat COVID as below

## 2021-11-04 NOTE — PROGRESS NOTE ADULT - ASSESSMENT
71 year old male with PMx of HTN, depression, chronic back pain, transferred from [facility], admitted for acute hypoxic respiratory failure secondary to COVID-19 pneumonia, altered mental status, and recent fall.

## 2021-11-04 NOTE — PROGRESS NOTE ADULT - PROBLEM SELECTOR PLAN 4
Patient AOx2, much improved from yesterday, patient answering questions appropriately more interactive. Encephalopathy likely in setting of COVID. As per discussion with wife baseline is A&Ox3.    - Treat COVID as above Likely i/s/o covid pna. Pt is a poor historian, so it's unclear if he lost consciousness. CTH negative for bleed.   - Fall precautions  - PT once patient improves

## 2021-11-04 NOTE — PROGRESS NOTE ADULT - PROBLEM SELECTOR PLAN 7
CK ~10,000 at [place]. Improving after ~2.5 L NS in ED.  Currently holding fluids in setting of COVID pneumonia.    - Monitor Scr and consider restarting fluids as needed Patients symptoms are consistent with COVID pneumonia.  D-dimer 988 likely elevated due to COVID.  Pt's family refused CT PE at [place]. Currently low suspicion for PE.    - Continue to monitor and treat COVID as above

## 2021-11-04 NOTE — PROGRESS NOTE ADULT - PROBLEM SELECTOR PLAN 3
Patients symptoms are consistent with COVID pneumonia.  D-dimer 988 likely elevated due to COVID.  Pt's family refused CT PE at [place]. Currently low suspicion for PE.    - Continue to monitor and treat COVID as above CK ~10,000 at [place]. S/P ~2.5 L NS in ED, currently holding fluids in setting of COVID pneumonia.  CK improved to 3950.   - Monitor Scr and consider restarting fluids as needed

## 2021-11-04 NOTE — PROGRESS NOTE ADULT - PROBLEM SELECTOR PLAN 8
Pt with elevated AST and ALT at admission likely 2/2 covid vs rhabdo.   - Continue to trend Pt with elevated AST and ALT at admission likely 2/2 covid vs rhabdo, AST and ALT continue to trend down.  - Continue to trend

## 2021-11-04 NOTE — PROGRESS NOTE ADULT - PROBLEM SELECTOR PLAN 2
Patient COVID positive, CXR consistent with covid-pna, patient is unvaccinated.     Plan:  - c/w decadron 6mg IV daily for 10 day course (11/2 - )  - c/w low dose insulin sliding scale and pantoprazole daily while on steroids   - c/w Remdesivir    - c/w covid droplet and airborne precautions  - c/w oxygen supplmentation Patient COVID positive, CXR consistent with covid-pna, patient is unvaccinated.   - c/w decadron 6mg IV daily for 10 day course (11/2 - )  - c/w low dose insulin sliding scale and pantoprazole daily while on steroids   - c/w Remdesivir    - c/w covid droplet and airborne precautions  - c/w oxygen supplmentation

## 2021-11-05 LAB
ALBUMIN SERPL ELPH-MCNC: 2.6 G/DL — LOW (ref 3.3–5)
ALP SERPL-CCNC: 57 U/L — SIGNIFICANT CHANGE UP (ref 40–120)
ALT FLD-CCNC: 59 U/L — HIGH (ref 10–45)
ANION GAP SERPL CALC-SCNC: 10 MMOL/L — SIGNIFICANT CHANGE UP (ref 5–17)
AST SERPL-CCNC: 128 U/L — HIGH (ref 10–40)
BASOPHILS # BLD AUTO: 0.01 K/UL — SIGNIFICANT CHANGE UP (ref 0–0.2)
BASOPHILS NFR BLD AUTO: 0.1 % — SIGNIFICANT CHANGE UP (ref 0–2)
BILIRUB SERPL-MCNC: 0.7 MG/DL — SIGNIFICANT CHANGE UP (ref 0.2–1.2)
BUN SERPL-MCNC: 30 MG/DL — HIGH (ref 7–23)
CALCIUM SERPL-MCNC: 9 MG/DL — SIGNIFICANT CHANGE UP (ref 8.4–10.5)
CHLORIDE SERPL-SCNC: 110 MMOL/L — HIGH (ref 96–108)
CO2 SERPL-SCNC: 21 MMOL/L — LOW (ref 22–31)
CREAT SERPL-MCNC: 0.69 MG/DL — SIGNIFICANT CHANGE UP (ref 0.5–1.3)
EOSINOPHIL # BLD AUTO: 0 K/UL — SIGNIFICANT CHANGE UP (ref 0–0.5)
EOSINOPHIL NFR BLD AUTO: 0 % — SIGNIFICANT CHANGE UP (ref 0–6)
GLUCOSE BLDC GLUCOMTR-MCNC: 105 MG/DL — HIGH (ref 70–99)
GLUCOSE BLDC GLUCOMTR-MCNC: 118 MG/DL — HIGH (ref 70–99)
GLUCOSE BLDC GLUCOMTR-MCNC: 121 MG/DL — HIGH (ref 70–99)
GLUCOSE BLDC GLUCOMTR-MCNC: 137 MG/DL — HIGH (ref 70–99)
GLUCOSE BLDC GLUCOMTR-MCNC: 151 MG/DL — HIGH (ref 70–99)
GLUCOSE SERPL-MCNC: 110 MG/DL — HIGH (ref 70–99)
HCT VFR BLD CALC: 42 % — SIGNIFICANT CHANGE UP (ref 39–50)
HGB BLD-MCNC: 14.3 G/DL — SIGNIFICANT CHANGE UP (ref 13–17)
IMM GRANULOCYTES NFR BLD AUTO: 0.8 % — SIGNIFICANT CHANGE UP (ref 0–1.5)
LYMPHOCYTES # BLD AUTO: 0.58 K/UL — LOW (ref 1–3.3)
LYMPHOCYTES # BLD AUTO: 6.9 % — LOW (ref 13–44)
MAGNESIUM SERPL-MCNC: 2.2 MG/DL — SIGNIFICANT CHANGE UP (ref 1.6–2.6)
MCHC RBC-ENTMCNC: 30.4 PG — SIGNIFICANT CHANGE UP (ref 27–34)
MCHC RBC-ENTMCNC: 34 GM/DL — SIGNIFICANT CHANGE UP (ref 32–36)
MCV RBC AUTO: 89.2 FL — SIGNIFICANT CHANGE UP (ref 80–100)
MONOCYTES # BLD AUTO: 0.31 K/UL — SIGNIFICANT CHANGE UP (ref 0–0.9)
MONOCYTES NFR BLD AUTO: 3.7 % — SIGNIFICANT CHANGE UP (ref 2–14)
NEUTROPHILS # BLD AUTO: 7.4 K/UL — SIGNIFICANT CHANGE UP (ref 1.8–7.4)
NEUTROPHILS NFR BLD AUTO: 88.5 % — HIGH (ref 43–77)
NRBC # BLD: 0 /100 WBCS — SIGNIFICANT CHANGE UP (ref 0–0)
PHOSPHATE SERPL-MCNC: 3.1 MG/DL — SIGNIFICANT CHANGE UP (ref 2.5–4.5)
PLATELET # BLD AUTO: 162 K/UL — SIGNIFICANT CHANGE UP (ref 150–400)
POTASSIUM SERPL-MCNC: 4.2 MMOL/L — SIGNIFICANT CHANGE UP (ref 3.5–5.3)
POTASSIUM SERPL-SCNC: 4.2 MMOL/L — SIGNIFICANT CHANGE UP (ref 3.5–5.3)
PROCALCITONIN SERPL-MCNC: 0.1 NG/ML — SIGNIFICANT CHANGE UP (ref 0.02–0.1)
PROT SERPL-MCNC: 6.1 G/DL — SIGNIFICANT CHANGE UP (ref 6–8.3)
RBC # BLD: 4.71 M/UL — SIGNIFICANT CHANGE UP (ref 4.2–5.8)
RBC # FLD: 13.9 % — SIGNIFICANT CHANGE UP (ref 10.3–14.5)
SODIUM SERPL-SCNC: 141 MMOL/L — SIGNIFICANT CHANGE UP (ref 135–145)
WBC # BLD: 8.37 K/UL — SIGNIFICANT CHANGE UP (ref 3.8–10.5)
WBC # FLD AUTO: 8.37 K/UL — SIGNIFICANT CHANGE UP (ref 3.8–10.5)

## 2021-11-05 PROCEDURE — 99233 SBSQ HOSP IP/OBS HIGH 50: CPT | Mod: GC

## 2021-11-05 PROCEDURE — 71045 X-RAY EXAM CHEST 1 VIEW: CPT | Mod: 26

## 2021-11-05 RX ADMIN — REMDESIVIR 500 MILLIGRAM(S): 5 INJECTION INTRAVENOUS at 17:02

## 2021-11-05 RX ADMIN — Medication 6 MILLIGRAM(S): at 17:01

## 2021-11-05 RX ADMIN — Medication 10 MILLIGRAM(S): at 16:59

## 2021-11-05 RX ADMIN — MIRTAZAPINE 30 MILLIGRAM(S): 45 TABLET, ORALLY DISINTEGRATING ORAL at 22:13

## 2021-11-05 RX ADMIN — ENOXAPARIN SODIUM 40 MILLIGRAM(S): 100 INJECTION SUBCUTANEOUS at 07:17

## 2021-11-05 RX ADMIN — Medication 1: at 13:34

## 2021-11-05 RX ADMIN — PANTOPRAZOLE SODIUM 40 MILLIGRAM(S): 20 TABLET, DELAYED RELEASE ORAL at 06:26

## 2021-11-05 NOTE — PROGRESS NOTE ADULT - PROBLEM SELECTOR PLAN 9
Pt possibly on hydrochlorothiazide, amlodipine and losartan (per outpatient med review).  BP acceptable, continue to hold meds in setting of COVID.  - Continue to monitor

## 2021-11-05 NOTE — PHYSICAL THERAPY INITIAL EVALUATION ADULT - PERTINENT HX OF CURRENT PROBLEM, REHAB EVAL
Pt is a 72 yo male transferred from [facility], admitted for acute hypoxic respiratory failure secondary to COVID-19 pneumonia, altered mental status, and recent fal

## 2021-11-05 NOTE — PROGRESS NOTE ADULT - PROBLEM SELECTOR PLAN 3
CK ~10,000 at [place]. S/P ~2.5 L NS in ED, currently holding fluids in setting of COVID pneumonia.  CK improved to 3950.   - Monitor Scr and consider restarting fluids as needed

## 2021-11-05 NOTE — PROGRESS NOTE ADULT - ASSESSMENT
71 year old male with PMx of HTN, depression, chronic back pain, transferred from [facility], admitted for acute hypoxic respiratory failure secondary to COVID-19 pneumonia, altered mental status, and recent fall.

## 2021-11-05 NOTE — PROGRESS NOTE ADULT - PROBLEM SELECTOR PLAN 4
Likely in setting of global weakness in setting of infection. Patient is a poor historian, so it's unclear if he lost consciousness but patient reports fall was mechanical. CTH negative for bleed.   - Fall precautions  - PT once patient improves

## 2021-11-05 NOTE — PROGRESS NOTE ADULT - PROBLEM SELECTOR PLAN 8
Pt with elevated  and ALT 59 at admission likely 2/2 covid vs rhabdo, AST and ALT continue to trend down.  - Continue to trend

## 2021-11-05 NOTE — PROGRESS NOTE ADULT - PROBLEM SELECTOR PLAN 6
Patient AOx2, much improved from yesterday, patient answering questions appropriately more interactive. Encephalopathy likely in setting of infection (patient intermittently febrile) . As per discussion with wife baseline is A&Ox3.    - Treat COVID as above

## 2021-11-05 NOTE — PHYSICAL THERAPY INITIAL EVALUATION ADULT - GENERAL OBSERVATIONS, REHAB EVAL
Pt received semi supine in bed with +EKG, +hep-lock, +HFNC FIO2:70%, +bed alarm, NAD. Pt left as found, NAD, call bell in reach, +bed alarm, RN awares.

## 2021-11-05 NOTE — PROGRESS NOTE ADULT - PROBLEM SELECTOR PLAN 5
Trop-I 247 then 244 after 6 hrs at [place]. EKG at [place] reportedly showed RBBB. Trop T 0.01.  Patient without complaint of chest pain.  Low suspicion for ACS.   - No intervention at this time

## 2021-11-05 NOTE — PROGRESS NOTE ADULT - PROBLEM SELECTOR PLAN 1
AHRF likely due to COVID pneumonia.  Patient on high flow, oxygen requirements were increasing, now stable saturating 95% on HFNC 60/70. CXR: opacity left lung field and milder amount in right lung field, consistent with covid pna. No leukocytosis, no bands, no fever.  Superimposed bacterial pna less likely given no fever, no wbc.  - c/w HFNC, if patient desaturates BIPAP and escalate, wean O2 as tolerated  - Treat COVID as below

## 2021-11-05 NOTE — CONSULT NOTE ADULT - SUBJECTIVE AND OBJECTIVE BOX
Patient is a 71y old  Male who presents with a chief complaint of Acute hypoxic respiratory failure 2/2 covid pna (05 Nov 2021 08:01)       HPI:  71 year old male with PMx of HTN, depression, chronic back pain, transferred from [facility], where he was admitted on 11/2 after an unwitnessed fall (down for >1 day) and found to have acute hypoxic respiratory failure secondary to COVID-19 pneumonia and altered mental status. Pt is currently a poor historian, but per family, his mental status has been worsening in the past few days and he had an unwitnessed fall coming from the bathroom where he fell and remained on the floor for >1 day. He lives at home with his wife and normally ambulates independently. He is not vaccinated for covid.    Patient complained of overall weakness, but denied chest pain, SOB, fevers, diarrhea/constipation, LE pain/swelling. Virgen in place.    ED Course (at [place]):  Vitals: /91, HR 76/min, RR 16/min, 93% on RA -> 95% on 5 L NC  EKG Right bundle branch block  Abnormal Labs: Covid+. Plt 127, D-dimers 988, Trop-I 247 then 244 after 6 hrs, K 3.0, Mg 2.8, BUN 38, Alb 2.6, , ALT 96, [place] [address] then 9896 after 6 hrs, pH 7.51, pCO2 37, pO2 72, HCO3 30, Utox positive for BZD (home med). UA dirty.  Imaging: CXR: infiltration in mid/lower left lung field and milder amount in right lung field. Consistent with covid pna. CTH: No hydrocephalus, acute intracranial hemorrhage, mass effect, or brain edema.  Interventions: 300 ASA rectal, Lovenox 80mg x1, 2 L NS then 150 ml/hr for 6 hrs, Decadron 6mg IV, Potassium 10mg IV x3.      **Note on home meds: As per pt's daughter Manuela, pt takes Hydrocodone, Diazepam, Mirtazapine at home due to "back surgeries/back problems and depression." She does not know the full list or the doses. Pt also on unknown anti-hypertensives. Will call pharmacy in AM.   (03 Nov 2021 03:08)      PAST MEDICAL & SURGICAL HISTORY:  No pertinent past medical history    HTN (hypertension)    Chronic back pain    Major depression, chronic    No significant past surgical history        MEDICATIONS  (STANDING):  dexAMETHasone  Injectable 6 milliGRAM(s) IV Push every 24 hours  diazepam    Tablet 10 milliGRAM(s) Oral daily  enoxaparin Injectable 40 milliGRAM(s) SubCutaneous every 24 hours  insulin lispro (ADMELOG) corrective regimen sliding scale   SubCutaneous every 6 hours  mirtazapine 30 milliGRAM(s) Oral at bedtime  pantoprazole  Injectable 40 milliGRAM(s) IV Push every 24 hours  remdesivir  IVPB   IV Intermittent   remdesivir  IVPB 100 milliGRAM(s) IV Intermittent every 24 hours    MEDICATIONS  (PRN):  acetaminophen     Tablet .. 650 milliGRAM(s) Oral every 6 hours PRN Temp greater or equal to 38C (100.4F), Mild Pain (1 - 3)  LORazepam   Injectable 2 milliGRAM(s) IV Push every 2 hours PRN Symptom-triggered: 2 point increase in CIWA -Ar score and a total score of 7 or LESS    FAMILY HISTORY:      CBC Full  -  ( 05 Nov 2021 08:21 )  WBC Count : 8.37 K/uL  RBC Count : 4.71 M/uL  Hemoglobin : 14.3 g/dL  Hematocrit : 42.0 %  Platelet Count - Automated : 162 K/uL  Mean Cell Volume : 89.2 fl  Mean Cell Hemoglobin : 30.4 pg  Mean Cell Hemoglobin Concentration : 34.0 gm/dL  Auto Neutrophil # : 7.40 K/uL  Auto Lymphocyte # : 0.58 K/uL  Auto Monocyte # : 0.31 K/uL  Auto Eosinophil # : 0.00 K/uL  Auto Basophil # : 0.01 K/uL  Auto Neutrophil % : 88.5 %  Auto Lymphocyte % : 6.9 %  Auto Monocyte % : 3.7 %  Auto Eosinophil % : 0.0 %  Auto Basophil % : 0.1 %      11-05    141  |  110<H>  |  30<H>  ----------------------------<  110<H>  4.2   |  21<L>  |  0.69    Ca    9.0      05 Nov 2021 08:21  Phos  3.1     11-05  Mg     2.2     11-05    TPro  6.1  /  Alb  2.6<L>  /  TBili  0.7  /  DBili  x   /  AST  128<H>  /  ALT  59<H>  /  AlkPhos  57  11-05            Radiology:    < from: Xray Chest 1 View- PORTABLE-Routine (Xray Chest 1 View- PORTABLE-Routine in AM.) (11.04.21 @ 07:41) >    EXAM:  XR CHEST PORTABLE ROUTINE 1V                          PROCEDURE DATE:  11/04/2021          INTERPRETATION:  TECHNIQUE: Single portable view of the chest.    COMPARISON:  11/2/2021    CLINICAL HISTORY: patient with covid pneumonia    FINDINGS:    Single frontal view of the chest demonstrates bilateral infiltrates, worse. The cardiomediastinal silhouette is enlarged. No acute osseous abnormalities. Overlying EKG leads and wires are noted    IMPRESSION: Multiple pneumonia, worse.        < from: CT Head No Cont (11.02.21 @ 13:10) >  EXAM:  CT BRAIN                            PROCEDURE DATE:  11/02/2021          INTERPRETATION:  Noncontrast CT of the brain.    CLINICAL INDICATION:  Altered mental status    TECHNIQUE : Axial CT scanning of the brain was obtained from the skull base to the vertex without the administration of intravenous contrast. Sagittal and coronal reformats were provided.    COMPARISON: None available    FINDINGS:    No hydrocephalus, mass effect, midline shift, acute intracranial hemorrhage, or brain edema.  Mild white matter microvascular ischemic disease.    Scattered paranasal sinus mucosal thickening. No paranasal sinus air-fluid levels. Mastoid air cells clear.      IMPRESSION:    No hydrocephalus, acute intracranial hemorrhage, mass effect, or brain edema.                Vital Signs Last 24 Hrs  T(C): 36.1 (05 Nov 2021 10:00), Max: 38.3 (04 Nov 2021 19:59)  T(F): 97 (05 Nov 2021 10:00), Max: 101 (04 Nov 2021 19:59)  HR: 72 (05 Nov 2021 08:31) (66 - 97)  BP: 161/88 (05 Nov 2021 08:00) (115/63 - 162/85)  BP(mean): 116 (05 Nov 2021 08:00) (83 - 116)  RR: 20 (05 Nov 2021 08:31) (20 - 40)  SpO2: 98% (05 Nov 2021 08:31) (87% - 98%)        REVIEW OF SYSTEMS: as per HPI      Physical Exam:  on COVID isolation, in accordance with current standards limiting patient contact, please refer to exam performed on 11/5/2021      GENERAL: Resting comfortably, no acute distress  HEAD:  Atraumatic, Normocephalic  EYES: EOMI, PERRLA, conjunctiva and sclera clear  NECK: Supple, No JVD, Normal thyroid, no enlarged nodes  NERVOUS SYSTEM:  Alert & Awake.   CHEST/LUNG: B/L good air entry; No rales, rhonchi, or wheezing, no increased work of breathing  HEART: S1S2 normal, no S3, Regular rate and rhythm; No murmurs  ABDOMEN: Soft, Nontender, Nondistended; Bowel sounds present  EXTREMITIES:  2+ Peripheral Pulses, No clubbing, cyanosis, or edema  LYMPH: No lymphadenopathy noted  SKIN: No rashes or lesions    PM&R Impression:    1) deconditioned  2) no focal weakness    Recommendations/ Plan :    1) Physical therapy focusing on therapeutic exercises, bed mobility/transfer out of bed evaluation, progressive ambulation with assistive devices prn.    2) Anticipated Disposition Plan/Recs:    pending functional progress

## 2021-11-05 NOTE — PROGRESS NOTE ADULT - PROBLEM SELECTOR PLAN 7
Patients symptoms are consistent with COVID pneumonia.  D-dimer 988 likely elevated due to COVID.  Pt's family refused CT PE at [place]. Currently low suspicion for PE.    - Continue to monitor and treat COVID as above

## 2021-11-05 NOTE — PHYSICAL THERAPY INITIAL EVALUATION ADULT - ADDITIONAL COMMENTS
As per pt, pt lives with spouse in a PH, few steps enter. ~5 steps inside the house. Prior to admission, pt ambulated independently without AD.

## 2021-11-05 NOTE — PROGRESS NOTE ADULT - PROBLEM SELECTOR PLAN 2
Patient COVID positive, CXR consistent with covid-pna, patient is unvaccinated.   - c/w decadron 6mg IV daily for 10 day course (11/2 - )  - c/w low dose insulin sliding scale and pantoprazole daily while on steroids   - c/w Remdesivir    - c/w covid droplet and airborne precautions  - c/w oxygen supplmentation

## 2021-11-05 NOTE — CONSULT NOTE ADULT - ASSESSMENT
per Internal Medicine    71 year old male with PMx of HTN, depression, chronic back pain, transferred from [facility], admitted for acute hypoxic respiratory failure secondary to COVID-19 pneumonia, altered mental status, and recent fall.     Problem/Plan - 1:  ·  Problem: Acute respiratory failure with hypoxia.   ·  Plan: AHRF likely due to COVID pneumonia.  Patient on high flow 50/70. CXR: infiltration in mid/lower left lung field and milder amount in right lung field, consistent with covid pna. No leukocytosis, no bands, no fever.  Superimposed bacterial pna less likely given no fever, no wbc.  - c/w HFNC, if patient desaturates BIPAP and escalate, wean O2 as tolerated  - Treat COVID as below.    Problem/Plan - 2:  ·  Problem: Pneumonia due to COVID-19 virus.   ·  Plan: Patient COVID positive, CXR consistent with covid-pna, patient is unvaccinated.   - c/w decadron 6mg IV daily for 10 day course (11/2 - )  - c/w low dose insulin sliding scale and pantoprazole daily while on steroids   - c/w Remdesivir    - c/w covid droplet and airborne precautions  - c/w oxygen supplmentation.    Problem/Plan - 3:  ·  Problem: Elevated creatine kinase.   ·  Plan: CK ~10,000 at [place]. S/P ~2.5 L NS in ED, currently holding fluids in setting of COVID pneumonia.  CK improved to 3950.   - Monitor Scr and consider restarting fluids as needed.    Problem/Plan - 4:  ·  Problem: Fall at home.   ·  Plan: Likely i/s/o covid pna. Pt is a poor historian, so it's unclear if he lost consciousness. CTH negative for bleed.   - Fall precautions  - PT once patient improves.    Problem/Plan - 5:  ·  Problem: Elevated troponin.   ·  Plan: Trop-I 247 then 244 after 6 hrs at [place]. EKG at [place] reportedly showed RBBB. Trop T 0.01.  Patient without complaint of chest pain.  Low suspicion for ACS.   - No intervention at this time.    Problem/Plan - 6:  ·  Problem: Altered mental status.   ·  Plan: Patient AOx2, much improved from yesterday, patient answering questions appropriately more interactive. Encephalopathy likely in setting of COVID. As per discussion with wife baseline is A&Ox3.    - Treat COVID as above.    Problem/Plan - 7:  ·  Problem: Suspected pulmonary embolism.   ·  Plan: Patients symptoms are consistent with COVID pneumonia.  D-dimer 988 likely elevated due to COVID.  Pt's family refused CT PE at [place]. Currently low suspicion for PE.    - Continue to monitor and treat COVID as above.    Problem/Plan - 8:  ·  Problem: Transaminitis.   ·  Plan: Pt with elevated AST and ALT at admission likely 2/2 covid vs rhabdo, AST and ALT continue to trend down.  - Continue to trend.    Problem/Plan - 9:  ·  Problem: HTN (hypertension).   ·  Plan: Pt possibly on hydrochlorothiazide, amlodipine and losartan (per outpatient med review).  BP acceptable, continue to hold meds in setting of COVID..   - Continue to monitor.    Problem/Plan - 10:  ·  Problem: Chronic back pain.   ·  Plan; Pt reportedly on hydrocodone and diazepam at home.  Home valium restarted.   - no acute interventions.    Problem/Plan - 11:  ·  Problem: Major depression.   ·  Plan: Pt on Mirtazapine for depression.  Holding for now given mental status.   - Continue to monitor mental status and restart as needed.    Problem/Plan - 12:  ·  Problem: Prophylactic measure.   ·  Plan: F: S/P 2.5L NS in ED   E: Replete PRN  N: Regular diet    DVT: Lovenox     Code: Full.

## 2021-11-05 NOTE — PROGRESS NOTE ADULT - SUBJECTIVE AND OBJECTIVE BOX
Patient is a 71y old  Male who presents with a chief complaint of Acute hypoxic respiratory failure 2/2 covid pna. Patient is more alert and aware this morning, interacting appropriately, reports he has a better appetite today. Patient does not report  fever, chills, dizziness, weakness, HA, Changes in vision, CP, palpitations, SOB, cough, N/V/D/C, dysuria, changes in bowel movements, LE edema. ROS otherwise negative    INTERVAL HPI/OVERNIGHT EVENTS:   No overnight events   Afebrile, hemodynamically stable     ICU Vital Signs Last 24 Hrs  T(C): 36.5 (05 Nov 2021 14:00), Max: 38.3 (04 Nov 2021 19:59)  T(F): 97.7 (05 Nov 2021 14:00), Max: 101 (04 Nov 2021 19:59)  HR: 74 (05 Nov 2021 12:00) (66 - 97)  BP: 159/86 (05 Nov 2021 12:00) (115/63 - 162/85)  BP(mean): 113 (05 Nov 2021 12:00) (83 - 116)  ABP: --  ABP(mean): --  RR: 24 (05 Nov 2021 12:00) (20 - 40)  SpO2: 95% (05 Nov 2021 12:00) (91% - 98%)    I&O's Summary    04 Nov 2021 07:01  -  05 Nov 2021 07:00  --------------------------------------------------------  IN: 1100 mL / OUT: 1375 mL / NET: -275 mL    05 Nov 2021 07:01  -  05 Nov 2021 15:28  --------------------------------------------------------  IN: 118 mL / OUT: 550 mL / NET: -432 mL    PHYSICAL EXAM:  GENERAL: Resting comfortably, no acute distress  HEAD:  Atraumatic, Normocephalic  EYES: EOMI, PERRLA, conjunctiva and sclera clear  NECK: Supple, No JVD, Normal thyroid, no enlarged nodes  NERVOUS SYSTEM:  Alert & Awake.   CHEST/LUNG: B/L good air entry; No rales, rhonchi, or wheezing, no increased work of breathing  HEART: S1S2 normal, no S3, Regular rate and rhythm; No murmurs  ABDOMEN: Soft, Nontender, Nondistended; Bowel sounds present  EXTREMITIES:  2+ Peripheral Pulses, No clubbing, cyanosis, or edema  LYMPH: No lymphadenopathy noted  SKIN: No rashes or lesions    LABS:                        14.3   8.37  )-----------( 162      ( 05 Nov 2021 08:21 )             42.0     11-05    141  |  110<H>  |  30<H>  ----------------------------<  110<H>  4.2   |  21<L>  |  0.69    Ca    9.0      05 Nov 2021 08:21  Phos  3.1     11-05  Mg     2.2     11-05    TPro  6.1  /  Alb  2.6<L>  /  TBili  0.7  /  DBili  x   /  AST  128<H>  /  ALT  59<H>  /  AlkPhos  57  11-05        CAPILLARY BLOOD GLUCOSE      POCT Blood Glucose.: 151 mg/dL (05 Nov 2021 12:25)  POCT Blood Glucose.: 121 mg/dL (05 Nov 2021 06:17)  POCT Blood Glucose.: 137 mg/dL (05 Nov 2021 00:15)  POCT Blood Glucose.: 139 mg/dL (04 Nov 2021 21:38)  POCT Blood Glucose.: 78 mg/dL (04 Nov 2021 17:07)        RADIOLOGY & ADDITIONAL TESTS:    Consultant(s) Notes Reviewed:  [x ] YES  [ ] NO    MEDICATIONS  (STANDING):  dexAMETHasone  Injectable 6 milliGRAM(s) IV Push every 24 hours  diazepam    Tablet 10 milliGRAM(s) Oral daily  enoxaparin Injectable 40 milliGRAM(s) SubCutaneous every 24 hours  insulin lispro (ADMELOG) corrective regimen sliding scale   SubCutaneous every 6 hours  mirtazapine 30 milliGRAM(s) Oral at bedtime  pantoprazole  Injectable 40 milliGRAM(s) IV Push every 24 hours  remdesivir  IVPB   IV Intermittent   remdesivir  IVPB 100 milliGRAM(s) IV Intermittent every 24 hours    MEDICATIONS  (PRN):  acetaminophen     Tablet .. 650 milliGRAM(s) Oral every 6 hours PRN Temp greater or equal to 38C (100.4F), Mild Pain (1 - 3)  LORazepam   Injectable 2 milliGRAM(s) IV Push every 2 hours PRN Symptom-triggered: 2 point increase in CIWA -Ar score and a total score of 7 or LESS      Care Discussed with Consultants/Other Providers [ x] YES  [ ] NO

## 2021-11-06 LAB
ALBUMIN SERPL ELPH-MCNC: 2.8 G/DL — LOW (ref 3.3–5)
ALP SERPL-CCNC: 58 U/L — SIGNIFICANT CHANGE UP (ref 40–120)
ALT FLD-CCNC: 66 U/L — HIGH (ref 10–45)
ANION GAP SERPL CALC-SCNC: 9 MMOL/L — SIGNIFICANT CHANGE UP (ref 5–17)
AST SERPL-CCNC: 109 U/L — HIGH (ref 10–40)
BASE EXCESS BLDA CALC-SCNC: -0.8 MMOL/L — SIGNIFICANT CHANGE UP (ref -2–3)
BASOPHILS # BLD AUTO: 0.01 K/UL — SIGNIFICANT CHANGE UP (ref 0–0.2)
BASOPHILS NFR BLD AUTO: 0.1 % — SIGNIFICANT CHANGE UP (ref 0–2)
BILIRUB SERPL-MCNC: 0.6 MG/DL — SIGNIFICANT CHANGE UP (ref 0.2–1.2)
BUN SERPL-MCNC: 33 MG/DL — HIGH (ref 7–23)
CALCIUM SERPL-MCNC: 9.2 MG/DL — SIGNIFICANT CHANGE UP (ref 8.4–10.5)
CHLORIDE SERPL-SCNC: 109 MMOL/L — HIGH (ref 96–108)
CK SERPL-CCNC: 500 U/L — HIGH (ref 30–200)
CO2 BLDA-SCNC: 20 MMOL/L — SIGNIFICANT CHANGE UP (ref 19–24)
CO2 SERPL-SCNC: 23 MMOL/L — SIGNIFICANT CHANGE UP (ref 22–31)
CREAT SERPL-MCNC: 0.73 MG/DL — SIGNIFICANT CHANGE UP (ref 0.5–1.3)
EOSINOPHIL # BLD AUTO: 0.01 K/UL — SIGNIFICANT CHANGE UP (ref 0–0.5)
EOSINOPHIL NFR BLD AUTO: 0.1 % — SIGNIFICANT CHANGE UP (ref 0–6)
GLUCOSE BLDC GLUCOMTR-MCNC: 114 MG/DL — HIGH (ref 70–99)
GLUCOSE BLDC GLUCOMTR-MCNC: 90 MG/DL — SIGNIFICANT CHANGE UP (ref 70–99)
GLUCOSE BLDC GLUCOMTR-MCNC: 93 MG/DL — SIGNIFICANT CHANGE UP (ref 70–99)
GLUCOSE SERPL-MCNC: 100 MG/DL — HIGH (ref 70–99)
HCO3 BLDA-SCNC: 19 MMOL/L — LOW (ref 21–28)
HCT VFR BLD CALC: 44.6 % — SIGNIFICANT CHANGE UP (ref 39–50)
HGB BLD-MCNC: 15.4 G/DL — SIGNIFICANT CHANGE UP (ref 13–17)
IMM GRANULOCYTES NFR BLD AUTO: 0.9 % — SIGNIFICANT CHANGE UP (ref 0–1.5)
LYMPHOCYTES # BLD AUTO: 1.06 K/UL — SIGNIFICANT CHANGE UP (ref 1–3.3)
LYMPHOCYTES # BLD AUTO: 9.2 % — LOW (ref 13–44)
MAGNESIUM SERPL-MCNC: 2.1 MG/DL — SIGNIFICANT CHANGE UP (ref 1.6–2.6)
MCHC RBC-ENTMCNC: 30.3 PG — SIGNIFICANT CHANGE UP (ref 27–34)
MCHC RBC-ENTMCNC: 34.5 GM/DL — SIGNIFICANT CHANGE UP (ref 32–36)
MCV RBC AUTO: 87.6 FL — SIGNIFICANT CHANGE UP (ref 80–100)
MONOCYTES # BLD AUTO: 0.58 K/UL — SIGNIFICANT CHANGE UP (ref 0–0.9)
MONOCYTES NFR BLD AUTO: 5.1 % — SIGNIFICANT CHANGE UP (ref 2–14)
NEUTROPHILS # BLD AUTO: 9.71 K/UL — HIGH (ref 1.8–7.4)
NEUTROPHILS NFR BLD AUTO: 84.6 % — HIGH (ref 43–77)
NRBC # BLD: 0 /100 WBCS — SIGNIFICANT CHANGE UP (ref 0–0)
PCO2 BLDA: 21 MMHG — LOW (ref 35–48)
PH BLDA: 7.57 — HIGH (ref 7.35–7.45)
PHOSPHATE SERPL-MCNC: 2.8 MG/DL — SIGNIFICANT CHANGE UP (ref 2.5–4.5)
PLATELET # BLD AUTO: 230 K/UL — SIGNIFICANT CHANGE UP (ref 150–400)
PO2 BLDA: 48 MMHG — CRITICAL LOW (ref 83–108)
POTASSIUM SERPL-MCNC: 4 MMOL/L — SIGNIFICANT CHANGE UP (ref 3.5–5.3)
POTASSIUM SERPL-SCNC: 4 MMOL/L — SIGNIFICANT CHANGE UP (ref 3.5–5.3)
PROT SERPL-MCNC: 6.2 G/DL — SIGNIFICANT CHANGE UP (ref 6–8.3)
RBC # BLD: 5.09 M/UL — SIGNIFICANT CHANGE UP (ref 4.2–5.8)
RBC # FLD: 13.8 % — SIGNIFICANT CHANGE UP (ref 10.3–14.5)
SAO2 % BLDA: 83 % — LOW (ref 94–98)
SODIUM SERPL-SCNC: 141 MMOL/L — SIGNIFICANT CHANGE UP (ref 135–145)
WBC # BLD: 11.47 K/UL — HIGH (ref 3.8–10.5)
WBC # FLD AUTO: 11.47 K/UL — HIGH (ref 3.8–10.5)

## 2021-11-06 PROCEDURE — 36000 PLACE NEEDLE IN VEIN: CPT | Mod: 77,59

## 2021-11-06 PROCEDURE — 99291 CRITICAL CARE FIRST HOUR: CPT

## 2021-11-06 PROCEDURE — 36569 INSJ PICC 5 YR+ W/O IMAGING: CPT

## 2021-11-06 PROCEDURE — 76937 US GUIDE VASCULAR ACCESS: CPT | Mod: 26,77,59

## 2021-11-06 PROCEDURE — 76937 US GUIDE VASCULAR ACCESS: CPT | Mod: 26,59

## 2021-11-06 PROCEDURE — 71045 X-RAY EXAM CHEST 1 VIEW: CPT | Mod: 26

## 2021-11-06 RX ORDER — FUROSEMIDE 40 MG
20 TABLET ORAL ONCE
Refills: 0 | Status: COMPLETED | OUTPATIENT
Start: 2021-11-06 | End: 2021-11-06

## 2021-11-06 RX ORDER — AMLODIPINE BESYLATE 2.5 MG/1
10 TABLET ORAL EVERY 24 HOURS
Refills: 0 | Status: DISCONTINUED | OUTPATIENT
Start: 2021-11-06 | End: 2021-11-18

## 2021-11-06 RX ORDER — POTASSIUM CHLORIDE 20 MEQ
40 PACKET (EA) ORAL ONCE
Refills: 0 | Status: COMPLETED | OUTPATIENT
Start: 2021-11-06 | End: 2021-11-06

## 2021-11-06 RX ORDER — ACETAMINOPHEN 500 MG
1000 TABLET ORAL ONCE
Refills: 0 | Status: COMPLETED | OUTPATIENT
Start: 2021-11-06 | End: 2021-11-06

## 2021-11-06 RX ORDER — DEXAMETHASONE 0.5 MG/5ML
10 ELIXIR ORAL EVERY 24 HOURS
Refills: 0 | Status: DISCONTINUED | OUTPATIENT
Start: 2021-11-06 | End: 2021-11-06

## 2021-11-06 RX ORDER — DEXAMETHASONE 0.5 MG/5ML
10 ELIXIR ORAL EVERY 24 HOURS
Refills: 0 | Status: DISCONTINUED | OUTPATIENT
Start: 2021-11-06 | End: 2021-11-11

## 2021-11-06 RX ADMIN — Medication 400 MILLIGRAM(S): at 19:32

## 2021-11-06 RX ADMIN — Medication 102 MILLIGRAM(S): at 16:15

## 2021-11-06 RX ADMIN — Medication 650 MILLIGRAM(S): at 03:13

## 2021-11-06 RX ADMIN — PANTOPRAZOLE SODIUM 40 MILLIGRAM(S): 20 TABLET, DELAYED RELEASE ORAL at 06:40

## 2021-11-06 RX ADMIN — Medication 20 MILLIGRAM(S): at 14:04

## 2021-11-06 RX ADMIN — Medication 1000 MILLIGRAM(S): at 20:02

## 2021-11-06 RX ADMIN — REMDESIVIR 500 MILLIGRAM(S): 5 INJECTION INTRAVENOUS at 16:15

## 2021-11-06 RX ADMIN — Medication 10 MILLIGRAM(S): at 11:57

## 2021-11-06 RX ADMIN — AMLODIPINE BESYLATE 10 MILLIGRAM(S): 2.5 TABLET ORAL at 08:02

## 2021-11-06 RX ADMIN — Medication 650 MILLIGRAM(S): at 05:38

## 2021-11-06 RX ADMIN — ENOXAPARIN SODIUM 40 MILLIGRAM(S): 100 INJECTION SUBCUTANEOUS at 08:02

## 2021-11-06 RX ADMIN — Medication 2 MILLIGRAM(S): at 16:25

## 2021-11-06 NOTE — PROGRESS NOTE ADULT - SUBJECTIVE AND OBJECTIVE BOX
71 year old male with PMx of HTN, depression, chronic back pain, transferred from [facility], where he was admitted on 11/2 after an unwitnessed fall (down for >1 day) and found to have acute hypoxic respiratory failure secondary to COVID-19 pneumonia and altered mental status. Pt is currently a poor historian, but per family, his mental status has been worsening in the past few days and he had an unwitnessed fall coming from the bathroom where he fell and remained on the floor for >1 day. He lives at home with his wife and normally ambulates independently. He is not vaccinated for covid. Patient was initially on 6L NC.  Hospital course complicated by hypoxemic respiratory failure with increased oxygen demand, patient currently saturating 100% on 50/50 HFNC.  CXR with worsening bl ground glass opacities.  Patient has been intermittently refusing proning. Today patient with increased work of breathing, stepped up to ICU for closer monitoring.      INTERVAL HPI/OVERNIGHT EVENTS:  Patient was seen and examined at bedside. Appears uncomfortable, reports difficulty breathing.  Otherwise does not report nausea, vomiting, fever, chills, chest pain, abdominal pain,     VITAL SIGNS:  T(F): 97.7 (11-06-21 @ 13:39)  HR: 114 (11-06-21 @ 16:00)  BP: 152/117 (11-06-21 @ 16:00)  RR: 38 (11-06-21 @ 16:00)  SpO2: 100% (11-06-21 @ 16:00)  Wt(kg): --    PHYSICAL EXAM:  GENERAL: Resting comfortably, no acute distress  HEAD:  Atraumatic, Normocephalic  EYES: EOMI, PERRLA, conjunctiva and sclera clear  NECK: Supple, No JVD, Normal thyroid, no enlarged nodes  NERVOUS SYSTEM:  Alert & Awake.   CHEST/LUNG: B/L good air entry; Patient tachypnic, bilateral crackles at lung bases  HEART: S1S2 normal, no S3, Regular rate and rhythm; No murmurs  ABDOMEN: Soft, Nontender, Nondistended; Bowel sounds present  EXTREMITIES:  2+ Peripheral Pulses, No clubbing, cyanosis, or edema  LYMPH: No lymphadenopathy noted  SKIN: No rashes or lesions    MEDICATIONS  (STANDING):  amLODIPine   Tablet 10 milliGRAM(s) Oral every 24 hours  dexAMETHasone  IVPB 10 milliGRAM(s) IV Intermittent every 24 hours  diazepam    Tablet 10 milliGRAM(s) Oral daily  enoxaparin Injectable 40 milliGRAM(s) SubCutaneous every 24 hours  insulin lispro (ADMELOG) corrective regimen sliding scale   SubCutaneous every 6 hours  mirtazapine 30 milliGRAM(s) Oral at bedtime  pantoprazole  Injectable 40 milliGRAM(s) IV Push every 24 hours  remdesivir  IVPB   IV Intermittent   remdesivir  IVPB 100 milliGRAM(s) IV Intermittent every 24 hours    MEDICATIONS  (PRN):  acetaminophen     Tablet .. 650 milliGRAM(s) Oral every 6 hours PRN Temp greater or equal to 38C (100.4F), Mild Pain (1 - 3)  LORazepam   Injectable 2 milliGRAM(s) IV Push every 2 hours PRN Symptom-triggered: 2 point increase in CIWA -Ar score and a total score of 7 or LESS      Allergies    Cipro (Rash)    Intolerances    Cipro (Other)      LABS:                        15.4   11.47 )-----------( 230      ( 06 Nov 2021 07:21 )             44.6     11-06    141  |  109<H>  |  33<H>  ----------------------------<  100<H>  4.0   |  23  |  0.73    Ca    9.2      06 Nov 2021 07:21  Phos  2.8     11-06  Mg     2.1     11-06    TPro  6.2  /  Alb  2.8<L>  /  TBili  0.6  /  DBili  x   /  AST  109<H>  /  ALT  66<H>  /  AlkPhos  58  11-06            RADIOLOGY & ADDITIONAL TESTS:  Reviewed   Transfer from [facility] to ICU    Hospital Course:   71 year old male with PMx of HTN, depression, chronic back pain, transferred from [facility], where he was admitted on 11/2 after an unwitnessed fall (down for >1 day) and found to have acute hypoxic respiratory failure secondary to COVID-19 pneumonia and altered mental status. Pt is currently a poor historian, but per family, his mental status has been worsening in the past few days and he had an unwitnessed fall coming from the bathroom where he fell and remained on the floor for >1 day. He lives at home with his wife and normally ambulates independently. He is not vaccinated for covid. Patient was initially on 6L NC.  Hospital course complicated by hypoxemic respiratory failure with increased oxygen demand, patient currently saturating 100% on 50/50 HFNC.  CXR with worsening bl ground glass opacities.  Patient has been intermittently refusing proning. Today patient with increased work of breathing, stepped up to ICU for closer monitoring.      INTERVAL HPI/OVERNIGHT EVENTS:  Patient was seen and examined at bedside. Appears uncomfortable, reports difficulty breathing.  Otherwise does not report nausea, vomiting, fever, chills, chest pain, abdominal pain,     VITAL SIGNS:  T(F): 97.7 (11-06-21 @ 13:39)  HR: 114 (11-06-21 @ 16:00)  BP: 152/117 (11-06-21 @ 16:00)  RR: 38 (11-06-21 @ 16:00)  SpO2: 100% (11-06-21 @ 16:00)  Wt(kg): --    PHYSICAL EXAM:  GENERAL: Resting comfortably, no acute distress  HEAD:  Atraumatic, Normocephalic  EYES: EOMI, PERRLA, conjunctiva and sclera clear  NECK: Supple, No JVD, Normal thyroid, no enlarged nodes  NERVOUS SYSTEM:  Alert & Awake.   CHEST/LUNG: B/L good air entry; Patient tachypnic, bilateral crackles at lung bases  HEART: S1S2 normal, no S3, Regular rate and rhythm; No murmurs  ABDOMEN: Soft, Nontender, Nondistended; Bowel sounds present  EXTREMITIES:  2+ Peripheral Pulses, No clubbing, cyanosis, or edema  LYMPH: No lymphadenopathy noted  SKIN: No rashes or lesions    MEDICATIONS  (STANDING):  amLODIPine   Tablet 10 milliGRAM(s) Oral every 24 hours  dexAMETHasone  IVPB 10 milliGRAM(s) IV Intermittent every 24 hours  diazepam    Tablet 10 milliGRAM(s) Oral daily  enoxaparin Injectable 40 milliGRAM(s) SubCutaneous every 24 hours  insulin lispro (ADMELOG) corrective regimen sliding scale   SubCutaneous every 6 hours  mirtazapine 30 milliGRAM(s) Oral at bedtime  pantoprazole  Injectable 40 milliGRAM(s) IV Push every 24 hours  remdesivir  IVPB   IV Intermittent   remdesivir  IVPB 100 milliGRAM(s) IV Intermittent every 24 hours    MEDICATIONS  (PRN):  acetaminophen     Tablet .. 650 milliGRAM(s) Oral every 6 hours PRN Temp greater or equal to 38C (100.4F), Mild Pain (1 - 3)  LORazepam   Injectable 2 milliGRAM(s) IV Push every 2 hours PRN Symptom-triggered: 2 point increase in CIWA -Ar score and a total score of 7 or LESS      Allergies    Cipro (Rash)    Intolerances    Cipro (Other)      LABS:                        15.4   11.47 )-----------( 230      ( 06 Nov 2021 07:21 )             44.6     11-06    141  |  109<H>  |  33<H>  ----------------------------<  100<H>  4.0   |  23  |  0.73    Ca    9.2      06 Nov 2021 07:21  Phos  2.8     11-06  Mg     2.1     11-06    TPro  6.2  /  Alb  2.8<L>  /  TBili  0.6  /  DBili  x   /  AST  109<H>  /  ALT  66<H>  /  AlkPhos  58  11-06            RADIOLOGY & ADDITIONAL TESTS:  Reviewed

## 2021-11-06 NOTE — PROCEDURE NOTE - NSPROCDETAILS_GEN_ALL_CORE
location identified, draped/prepped, sterile technique used/blood seen on insertion/dressing applied/flushes easily/secured in place/sterile technique, catheter placed
blood seen on insertion/flushes easily/secured in place
location identified, draped/prepped, sterile technique used/blood seen on insertion/dressing applied/flushes easily/secured in place/sterile technique, catheter placed

## 2021-11-06 NOTE — PROCEDURE NOTE - NSPOSTCAREGUIDE_GEN_A_CORE
Verbal/written post procedure instructions were given to patient/caregiver/Instructed patient/caregiver regarding signs and symptoms of infection/Keep the cast/splint/dressing clean and dry/Care for catheter as per unit/ICU protocols
Verbal/written post procedure instructions were given to patient/caregiver/Care for catheter as per unit/ICU protocols
Verbal/written post procedure instructions were given to patient/caregiver/Instructed patient/caregiver regarding signs and symptoms of infection/Keep the cast/splint/dressing clean and dry/Care for catheter as per unit/ICU protocols

## 2021-11-06 NOTE — PROGRESS NOTE ADULT - PROBLEM SELECTOR PLAN 7
Patients symptoms are consistent with COVID pneumonia.  D-dimer 988 likely elevated due to COVID.  Pt's family refused CT PE at [place]. Currently low suspicion for PE.    - Continue to monitor and treat COVID as above

## 2021-11-06 NOTE — PROGRESS NOTE ADULT - SUBJECTIVE AND OBJECTIVE BOX
***STEP UP TO MICU***  71 year old male with PMx of HTN, depression, chronic back pain, transferred from [facility], where he was admitted on 11/2 after an unwitnessed fall (down for >1 day) and found to have acute hypoxic respiratory failure secondary to COVID-19 pneumonia and altered mental status. Pt is currently a poor historian, but per family, his mental status has been worsening in the past few days and he had an unwitnessed fall coming from the bathroom where he fell and remained on the floor for >1 day. He lives at home with his wife and normally ambulates independently. He is not vaccinated for covid. Patient complained of overall weakness, but denied chest pain, SOB, fevers, diarrhea/constipation, LE pain/swelling, Virgen in place. Pt started having increased work of breathing and o2 requirement inc. He therefore was stepped up to MICU for further management.     SUBJECTIVE / INTERVAL HPI: Patient seen and examined at bedside. He was resting but appeared in distress. He did not follow commands or participate in ROS assessment.    VITAL SIGNS:  Vital Signs Last 24 Hrs  T(C): 36.5 (06 Nov 2021 13:39), Max: 36.8 (06 Nov 2021 05:23)  T(F): 97.7 (06 Nov 2021 13:39), Max: 98.2 (06 Nov 2021 05:23)  HR: 114 (06 Nov 2021 16:00) (67 - 114)  BP: 152/117 (06 Nov 2021 16:00) (140/107 - 174/99)  BP(mean): 129 (06 Nov 2021 16:00) (106 - 130)  RR: 38 (06 Nov 2021 16:00) (20 - 38)  SpO2: 100% (06 Nov 2021 16:00) (85% - 100%)    PHYSICAL EXAM:  GENERAL: resting but in some discomfort, shaking and sweating   HEAD:  Atraumatic, Normocephalic  EYES: EOMI, PERRLA, conjunctiva and sclera clear  NECK: Supple, No JVD, Normal thyroid, no enlarged nodes  NERVOUS SYSTEM: AAO x1, no focal deficit   CHEST/LUNG: crackles b/l  HEART: S1S2 normal, no S3, Regular rate and rhythm; No murmurs  ABDOMEN: distended and tender to palpation   EXTREMITIES:  2+ Peripheral Pulses, No clubbing, cyanosis, or edema  LYMPH: No lymphadenopathy noted  SKIN: No rashes or lesions    MEDICATIONS:  MEDICATIONS  (STANDING):  amLODIPine   Tablet 10 milliGRAM(s) Oral every 24 hours  dexAMETHasone  IVPB 10 milliGRAM(s) IV Intermittent every 24 hours  diazepam    Tablet 10 milliGRAM(s) Oral daily  enoxaparin Injectable 40 milliGRAM(s) SubCutaneous every 24 hours  insulin lispro (ADMELOG) corrective regimen sliding scale   SubCutaneous every 6 hours  mirtazapine 30 milliGRAM(s) Oral at bedtime  pantoprazole  Injectable 40 milliGRAM(s) IV Push every 24 hours  remdesivir  IVPB   IV Intermittent   remdesivir  IVPB 100 milliGRAM(s) IV Intermittent every 24 hours    MEDICATIONS  (PRN):  acetaminophen     Tablet .. 650 milliGRAM(s) Oral every 6 hours PRN Temp greater or equal to 38C (100.4F), Mild Pain (1 - 3)  LORazepam   Injectable 2 milliGRAM(s) IV Push every 2 hours PRN Symptom-triggered: 2 point increase in CIWA -Ar score and a total score of 7 or LESS      ALLERGIES:  Allergies    Cipro (Rash)    Intolerances    Cipro (Other)      LABS:                        15.4   11.47 )-----------( 230      ( 06 Nov 2021 07:21 )             44.6     11-06    141  |  109<H>  |  33<H>  ----------------------------<  100<H>  4.0   |  23  |  0.73    Ca    9.2      06 Nov 2021 07:21  Phos  2.8     11-06  Mg     2.1     11-06    TPro  6.2  /  Alb  2.8<L>  /  TBili  0.6  /  DBili  x   /  AST  109<H>  /  ALT  66<H>  /  AlkPhos  58  11-06        CAPILLARY BLOOD GLUCOSE      POCT Blood Glucose.: 93 mg/dL (06 Nov 2021 12:01)      RADIOLOGY & ADDITIONAL TESTS: Reviewed. ***STEP UP TO MICU***  71 year old male with PMx of HTN, depression, chronic back pain who was transferred from [facility], where he was admitted on 11/2 after an unwitnessed fall (down for >1 day.) He was found to have acute hypoxic respiratory failure secondary to COVID-19 pneumonia, altered mental status and rhabdomyolyis. Pt unable to recollect events or provide any medical history. Prior to this hospitalization he liveds at home with his wife and ambulated independently. He is not vaccinated for COVID. Patient complained of generalized malaise, but he denied any other pertinent symptoms including shortness breath and chest pain. Pt started having increased work of breathing and O2 requirements on 11/6 and he was subsequently transferred to MICU for further management.     SUBJECTIVE / INTERVAL HPI: Patient seen and examined at bedside. Patient is tachypneic with increased work of breathing, ROS limited as patient unable to provide history or cooperate with exam.     VITAL SIGNS:  Vital Signs Last 24 Hrs  T(C): 36.5 (06 Nov 2021 13:39), Max: 36.8 (06 Nov 2021 05:23)  T(F): 97.7 (06 Nov 2021 13:39), Max: 98.2 (06 Nov 2021 05:23)  HR: 114 (06 Nov 2021 16:00) (67 - 114)  BP: 152/117 (06 Nov 2021 16:00) (140/107 - 174/99)  BP(mean): 129 (06 Nov 2021 16:00) (106 - 130)  RR: 38 (06 Nov 2021 16:00) (20 - 38)  SpO2: 100% (06 Nov 2021 16:00) (85% - 100%)    PHYSICAL EXAM:  GENERAL: resting but in some discomfort, shaking and sweating   HEAD:  Atraumatic, Normocephalic  EYES: EOMI, PERRLA, conjunctiva and sclera clear  NECK: Supple, No JVD, Normal thyroid, no enlarged nodes  NERVOUS SYSTEM: AAO x1, no focal deficit   CHEST/LUNG: crackles b/l  HEART: S1S2 normal, no S3, Regular rate and rhythm; No murmurs  ABDOMEN: distended and tender to palpation   EXTREMITIES:  2+ Peripheral Pulses, No clubbing, cyanosis, or edema  LYMPH: No lymphadenopathy noted  SKIN: No rashes or lesions    MEDICATIONS:  MEDICATIONS  (STANDING):  amLODIPine   Tablet 10 milliGRAM(s) Oral every 24 hours  dexAMETHasone  IVPB 10 milliGRAM(s) IV Intermittent every 24 hours  diazepam    Tablet 10 milliGRAM(s) Oral daily  enoxaparin Injectable 40 milliGRAM(s) SubCutaneous every 24 hours  insulin lispro (ADMELOG) corrective regimen sliding scale   SubCutaneous every 6 hours  mirtazapine 30 milliGRAM(s) Oral at bedtime  pantoprazole  Injectable 40 milliGRAM(s) IV Push every 24 hours  remdesivir  IVPB   IV Intermittent   remdesivir  IVPB 100 milliGRAM(s) IV Intermittent every 24 hours    MEDICATIONS  (PRN):  acetaminophen     Tablet .. 650 milliGRAM(s) Oral every 6 hours PRN Temp greater or equal to 38C (100.4F), Mild Pain (1 - 3)  LORazepam   Injectable 2 milliGRAM(s) IV Push every 2 hours PRN Symptom-triggered: 2 point increase in CIWA -Ar score and a total score of 7 or LESS      ALLERGIES:  Allergies    Cipro (Rash)    Intolerances    Cipro (Other)      LABS:                        15.4   11.47 )-----------( 230      ( 06 Nov 2021 07:21 )             44.6     11-06    141  |  109<H>  |  33<H>  ----------------------------<  100<H>  4.0   |  23  |  0.73    Ca    9.2      06 Nov 2021 07:21  Phos  2.8     11-06  Mg     2.1     11-06    TPro  6.2  /  Alb  2.8<L>  /  TBili  0.6  /  DBili  x   /  AST  109<H>  /  ALT  66<H>  /  AlkPhos  58  11-06        CAPILLARY BLOOD GLUCOSE      POCT Blood Glucose.: 93 mg/dL (06 Nov 2021 12:01)      RADIOLOGY & ADDITIONAL TESTS: Reviewed.

## 2021-11-06 NOTE — PROCEDURE NOTE - NSICDXPROCEDURE_GEN_ALL_CORE_FT
PROCEDURES:  Insertion, needle, vein 03-Nov-2021 12:40:08  Loida Penn  Ultrasound guided venous access 03-Nov-2021 12:40:39  Loida Penn  
PROCEDURES:  Insertion of intravenous catheter with ultrasound guidance 06-Nov-2021 17:10:19  Spencer Goldberg  
PROCEDURES:  Ultrasound guided venous access 06-Nov-2021 16:32:30  Petra Todd

## 2021-11-06 NOTE — PROGRESS NOTE ADULT - PROBLEM SELECTOR PLAN 5
Trop-I 247 then 244 after 6 hrs at [place]. EKG at [place] reportedly showed RBBB. Trop T 0.01.  Patient without complaint of chest pain.  Low suspicion for ACS.   - No intervention at this time

## 2021-11-06 NOTE — PROGRESS NOTE ADULT - ASSESSMENT
71 year old male with PMx of HTN, depression, chronic back pain, transferred from [facility], where he was admitted on 11/2 after an unwitnessed fall (down for >1 day) and found to have acute hypoxic respiratory failure secondary to COVID-19 pneumonia.

## 2021-11-06 NOTE — CHART NOTE - NSCHARTNOTEFT_GEN_A_CORE
Reviewed [facility]  Registry and prescriptions reiflls are as follows:       10/22/2021 10/23/2021 diazepam 10 mg tablet  90 30 Mark Hodges [id number] [facility]  09/23/2021 09/25/2021 diazepam 10 mg tablet  90 90 Mark Hodges [id number] [facility]   08/05/2021 08/05/2021 hydrocodone-acetaminophen 7.5-325 mg tablet  60 30 Guille Bee MD [id number] [facility]   07/23/2021 07/23/2021 diazepam 10 mg tablet  90 30 Mark Hodges [id number] [facility]   07/01/2021 07/02/2021 hydrocodone-acetaminophen 7.5-325 mg tablet  60 30 Guille Bee MD [id number] [facility]   06/24/2021 06/25/2021 diazepam 10 mg tablet  90 30 Mark Hodges [id number] [facility]   06/03/2021 06/04/2021 hydrocodone-acetaminophen 7.5-325 mg tablet  60 30 Guille Bee MD [id number] [facility]   05/28/2021 05/29/2021 diazepam 10 mg tablet  90 30 Mark Hodges [id number] [facility]   05/06/2021 05/08/2021 hydrocodone-acetaminophen 7.5-325 mg tablet  60 30 Guille Bee MD [id number] [facility]   04/29/2021 05/01/2021 diazepam 10 mg tablet  90 30 Noy Hodges [id number] [facility]   03/29/2021 04/01/2021 diazepam 10 mg tablet  90 30 Noy Hodges [id number] [facility]   03/09/2021 03/11/2021 hydrocodone-acetaminophen 7.5-325 mg tablet  60 30 Guille Bee MD [id number] [facility]   02/22/2021 02/23/2021 hydrocodone-acetaminophen 7.5-325 mg tablet  32 16 Guille Bee MD [id number] [facility]   02/09/2021 02/13/2021 hydrocodone-acetaminophen 7.5-325 mg tablet  10 5 Guille Bee MD [id number] [facility]   02/06/2021 02/09/2021 hydrocodone-acetaminophen 7.5-325 mg tablet  10 5 Guille Bee MD [id number] [facility]   02/04/2021 02/05/2021 diazepam 10 mg tablet  60 30 Mark Hodges [id number] [facility]   01/05/2021 01/06/2021 diazepam 10 mg tablet  60 30 Mark Hodges [id number] [facility]   12/29/2020 12/30/2020 hydrocodone-acetaminophen 7.5-325 mg tablet  60 30 Guille Bee MD [id number] [facility]   11/19/2020 11/28/2020 hydrocodone-acetaminophen 7.5-325 mg tablet

## 2021-11-06 NOTE — PROGRESS NOTE ADULT - PROBLEM SELECTOR PLAN 2
Patient COVID positive, CXR consistent with covid-pna, patient is unvaccinated.   - Lasix 20mg today  - increased decadron from 6mg to 10mg IV daily for 10 day course (11/2 - )  - c/w low dose insulin sliding scale and pantoprazole daily while on steroids   - c/w Remdesivir    - c/w covid droplet and airborne precautions  - c/w oxygen supplmentation

## 2021-11-06 NOTE — PROGRESS NOTE ADULT - PROBLEM SELECTOR PLAN 9
Pt possibly on hydrochlorothiazide, amlodipine and losartan (per outpatient med review).  Amlodipine 10mg was restarted today.  - Continue to monitor

## 2021-11-06 NOTE — PROGRESS NOTE ADULT - PROBLEM SELECTOR PLAN 3
CK ~10,000 at [place]. S/P ~2.5 L NS in ED, currently holding fluids in setting of COVID pneumonia.  CK improved to 500.   - Monitor Scr and consider restarting fluids as needed

## 2021-11-07 LAB
ALBUMIN SERPL ELPH-MCNC: 3.1 G/DL — LOW (ref 3.3–5)
ALP SERPL-CCNC: 61 U/L — SIGNIFICANT CHANGE UP (ref 40–120)
ALT FLD-CCNC: 63 U/L — HIGH (ref 10–45)
ANION GAP SERPL CALC-SCNC: 5 MMOL/L — SIGNIFICANT CHANGE UP (ref 5–17)
APPEARANCE UR: ABNORMAL
AST SERPL-CCNC: 84 U/L — HIGH (ref 10–40)
BACTERIA # UR AUTO: ABNORMAL /HPF
BASOPHILS # BLD AUTO: 0.02 K/UL — SIGNIFICANT CHANGE UP (ref 0–0.2)
BASOPHILS NFR BLD AUTO: 0.2 % — SIGNIFICANT CHANGE UP (ref 0–2)
BILIRUB SERPL-MCNC: 0.7 MG/DL — SIGNIFICANT CHANGE UP (ref 0.2–1.2)
BILIRUB UR-MCNC: NEGATIVE — SIGNIFICANT CHANGE UP
BUN SERPL-MCNC: 38 MG/DL — HIGH (ref 7–23)
CALCIUM SERPL-MCNC: 9.2 MG/DL — SIGNIFICANT CHANGE UP (ref 8.4–10.5)
CHLORIDE SERPL-SCNC: 113 MMOL/L — HIGH (ref 96–108)
CO2 SERPL-SCNC: 26 MMOL/L — SIGNIFICANT CHANGE UP (ref 22–31)
COLOR SPEC: YELLOW — SIGNIFICANT CHANGE UP
COVID-19 NUCLEOCAPSID GAM AB INTERP: POSITIVE
COVID-19 NUCLEOCAPSID TOTAL GAM ANTIBODY RESULT: 16.2 INDEX — HIGH
COVID-19 SPIKE DOMAIN AB INTERP: POSITIVE
COVID-19 SPIKE DOMAIN ANTIBODY RESULT: 17.4 U/ML — HIGH
CREAT SERPL-MCNC: 0.9 MG/DL — SIGNIFICANT CHANGE UP (ref 0.5–1.3)
CRP SERPL-MCNC: 93.4 MG/L — HIGH (ref 0–4)
CULTURE RESULTS: SIGNIFICANT CHANGE UP
CULTURE RESULTS: SIGNIFICANT CHANGE UP
D DIMER BLD IA.RAPID-MCNC: 865 NG/ML DDU — HIGH
DIFF PNL FLD: ABNORMAL
EOSINOPHIL # BLD AUTO: 0 K/UL — SIGNIFICANT CHANGE UP (ref 0–0.5)
EOSINOPHIL NFR BLD AUTO: 0 % — SIGNIFICANT CHANGE UP (ref 0–6)
EPI CELLS # UR: SIGNIFICANT CHANGE UP /HPF (ref 0–5)
FERRITIN SERPL-MCNC: 649 NG/ML — HIGH (ref 30–400)
GLUCOSE BLDC GLUCOMTR-MCNC: 126 MG/DL — HIGH (ref 70–99)
GLUCOSE BLDC GLUCOMTR-MCNC: 131 MG/DL — HIGH (ref 70–99)
GLUCOSE BLDC GLUCOMTR-MCNC: 132 MG/DL — HIGH (ref 70–99)
GLUCOSE BLDC GLUCOMTR-MCNC: 87 MG/DL — SIGNIFICANT CHANGE UP (ref 70–99)
GLUCOSE BLDC GLUCOMTR-MCNC: 98 MG/DL — SIGNIFICANT CHANGE UP (ref 70–99)
GLUCOSE SERPL-MCNC: 117 MG/DL — HIGH (ref 70–99)
GLUCOSE UR QL: NEGATIVE — SIGNIFICANT CHANGE UP
HCT VFR BLD CALC: 43.8 % — SIGNIFICANT CHANGE UP (ref 39–50)
HGB BLD-MCNC: 14.5 G/DL — SIGNIFICANT CHANGE UP (ref 13–17)
HYALINE CASTS # UR AUTO: SIGNIFICANT CHANGE UP /LPF (ref 0–2)
IMM GRANULOCYTES NFR BLD AUTO: 1.1 % — SIGNIFICANT CHANGE UP (ref 0–1.5)
KETONES UR-MCNC: ABNORMAL MG/DL
LDH SERPL L TO P-CCNC: 503 U/L — HIGH (ref 50–242)
LEUKOCYTE ESTERASE UR-ACNC: ABNORMAL
LYMPHOCYTES # BLD AUTO: 0.72 K/UL — LOW (ref 1–3.3)
LYMPHOCYTES # BLD AUTO: 6.4 % — LOW (ref 13–44)
MAGNESIUM SERPL-MCNC: 2.3 MG/DL — SIGNIFICANT CHANGE UP (ref 1.6–2.6)
MCHC RBC-ENTMCNC: 29.4 PG — SIGNIFICANT CHANGE UP (ref 27–34)
MCHC RBC-ENTMCNC: 33.1 GM/DL — SIGNIFICANT CHANGE UP (ref 32–36)
MCV RBC AUTO: 88.8 FL — SIGNIFICANT CHANGE UP (ref 80–100)
MONOCYTES # BLD AUTO: 0.6 K/UL — SIGNIFICANT CHANGE UP (ref 0–0.9)
MONOCYTES NFR BLD AUTO: 5.3 % — SIGNIFICANT CHANGE UP (ref 2–14)
NEUTROPHILS # BLD AUTO: 9.81 K/UL — HIGH (ref 1.8–7.4)
NEUTROPHILS NFR BLD AUTO: 87 % — HIGH (ref 43–77)
NITRITE UR-MCNC: NEGATIVE — SIGNIFICANT CHANGE UP
NRBC # BLD: 0 /100 WBCS — SIGNIFICANT CHANGE UP (ref 0–0)
NT-PROBNP SERPL-SCNC: 342 PG/ML — HIGH (ref 0–300)
PH UR: 6 — SIGNIFICANT CHANGE UP (ref 5–8)
PHOSPHATE SERPL-MCNC: 3.1 MG/DL — SIGNIFICANT CHANGE UP (ref 2.5–4.5)
PLATELET # BLD AUTO: 252 K/UL — SIGNIFICANT CHANGE UP (ref 150–400)
POTASSIUM SERPL-MCNC: 4.2 MMOL/L — SIGNIFICANT CHANGE UP (ref 3.5–5.3)
POTASSIUM SERPL-SCNC: 4.2 MMOL/L — SIGNIFICANT CHANGE UP (ref 3.5–5.3)
PROT SERPL-MCNC: 6.3 G/DL — SIGNIFICANT CHANGE UP (ref 6–8.3)
PROT UR-MCNC: 30 MG/DL
RBC # BLD: 4.93 M/UL — SIGNIFICANT CHANGE UP (ref 4.2–5.8)
RBC # FLD: 14.2 % — SIGNIFICANT CHANGE UP (ref 10.3–14.5)
RBC CASTS # UR COMP ASSIST: ABNORMAL /HPF
SARS-COV-2 IGG+IGM SERPL QL IA: 16.2 INDEX — HIGH
SARS-COV-2 IGG+IGM SERPL QL IA: 17.4 U/ML — HIGH
SARS-COV-2 IGG+IGM SERPL QL IA: POSITIVE
SARS-COV-2 IGG+IGM SERPL QL IA: POSITIVE
SODIUM SERPL-SCNC: 144 MMOL/L — SIGNIFICANT CHANGE UP (ref 135–145)
SP GR SPEC: 1.02 — SIGNIFICANT CHANGE UP (ref 1–1.03)
SPECIMEN SOURCE: SIGNIFICANT CHANGE UP
SPECIMEN SOURCE: SIGNIFICANT CHANGE UP
TSH SERPL-MCNC: 0.71 UIU/ML — SIGNIFICANT CHANGE UP (ref 0.27–4.2)
UROBILINOGEN FLD QL: 4 E.U./DL
WBC # BLD: 11.27 K/UL — HIGH (ref 3.8–10.5)
WBC # FLD AUTO: 11.27 K/UL — HIGH (ref 3.8–10.5)
WBC UR QL: ABNORMAL /HPF

## 2021-11-07 PROCEDURE — 99291 CRITICAL CARE FIRST HOUR: CPT

## 2021-11-07 PROCEDURE — 71045 X-RAY EXAM CHEST 1 VIEW: CPT | Mod: 26

## 2021-11-07 RX ORDER — CEFTRIAXONE 500 MG/1
1000 INJECTION, POWDER, FOR SOLUTION INTRAMUSCULAR; INTRAVENOUS EVERY 24 HOURS
Refills: 0 | Status: DISCONTINUED | OUTPATIENT
Start: 2021-11-07 | End: 2021-11-08

## 2021-11-07 RX ORDER — TAMSULOSIN HYDROCHLORIDE 0.4 MG/1
0.4 CAPSULE ORAL AT BEDTIME
Refills: 0 | Status: DISCONTINUED | OUTPATIENT
Start: 2021-11-07 | End: 2021-11-07

## 2021-11-07 RX ORDER — DIAZEPAM 5 MG
2 TABLET ORAL ONCE
Refills: 0 | Status: DISCONTINUED | OUTPATIENT
Start: 2021-11-07 | End: 2021-11-07

## 2021-11-07 RX ORDER — TAMSULOSIN HYDROCHLORIDE 0.4 MG/1
0.4 CAPSULE ORAL AT BEDTIME
Refills: 0 | Status: DISCONTINUED | OUTPATIENT
Start: 2021-11-07 | End: 2021-11-18

## 2021-11-07 RX ADMIN — Medication 650 MILLIGRAM(S): at 15:29

## 2021-11-07 RX ADMIN — PANTOPRAZOLE SODIUM 40 MILLIGRAM(S): 20 TABLET, DELAYED RELEASE ORAL at 06:00

## 2021-11-07 RX ADMIN — ENOXAPARIN SODIUM 40 MILLIGRAM(S): 100 INJECTION SUBCUTANEOUS at 06:01

## 2021-11-07 RX ADMIN — CEFTRIAXONE 100 MILLIGRAM(S): 500 INJECTION, POWDER, FOR SOLUTION INTRAMUSCULAR; INTRAVENOUS at 11:50

## 2021-11-07 RX ADMIN — TAMSULOSIN HYDROCHLORIDE 0.4 MILLIGRAM(S): 0.4 CAPSULE ORAL at 21:49

## 2021-11-07 RX ADMIN — MIRTAZAPINE 30 MILLIGRAM(S): 45 TABLET, ORALLY DISINTEGRATING ORAL at 21:50

## 2021-11-07 RX ADMIN — REMDESIVIR 500 MILLIGRAM(S): 5 INJECTION INTRAVENOUS at 15:21

## 2021-11-07 RX ADMIN — Medication 102 MILLIGRAM(S): at 14:09

## 2021-11-07 RX ADMIN — Medication 650 MILLIGRAM(S): at 16:13

## 2021-11-07 RX ADMIN — Medication 2 MILLIGRAM(S): at 14:41

## 2021-11-07 NOTE — DIETITIAN INITIAL EVALUATION ADULT. - PROBLEM SELECTOR PLAN 10
Pt reportedly on hydrocodone and diazepam at home    Plan:  -Med rec in AM and restart at lower doses considering risk of withdrawal but also AMS
0

## 2021-11-07 NOTE — DIETITIAN INITIAL EVALUATION ADULT. - PROBLEM SELECTOR PLAN 4
AOx1-2, lethargic.   Likely 2/2 covid pna    Plan:  -Obtain collateral from family on baseline mental status

## 2021-11-07 NOTE — PROGRESS NOTE ADULT - SUBJECTIVE AND OBJECTIVE BOX
OVERNIGHT EVENTS: Bladder scan with urine >300. requiring straight cath x 2, each about 350-400cc.    SUBJECTIVE:  Patient seen and examined at bedside. States his breathing has improved and he no longer is feeling as short of breath. Denies chest pain, palpitations, cough, dysuria, n/v/d/c.     ROS negative unless stated above    Vital Signs Last 12 Hrs  T(F): 100.2 (21 @ 13:00), Max: 100.2 (21 @ 13:00)  HR: 96 (21 @ 15:00) (75 - 96)  BP: 158/82 (21 @ 15:00) (116/73 - 163/100)  BP(mean): 113 (21 @ 15:) (90 - 126)  RR: 23 (21 @ 15:) (19 - 29)  SpO2: 93% (21 @ 15:00) (91% - 98%)  I&O's Summary    2021 08:01  -  2021 07:00  --------------------------------------------------------  IN: 0 mL / OUT: 2350 mL / NET: -2350 mL    2021 07:01  -  2021 15:49  --------------------------------------------------------  IN: 0 mL / OUT: 250 mL / NET: -250 mL        PHYSICAL EXAM:  GENERAL: resting comfortably in no acute distress. mildly diaphoretic  HEAD:  Atraumatic, Normocephalic  EYES: EOMI, PERRLA, conjunctiva and sclera clear  NECK: Supple, No JVD  CHEST/LUNG: crackles at bases, otherwise no rales or rhonchi  HEART: S1S2 normal. Regular rate, tachycardic; No murmurs  ABDOMEN: mildly distended, normoactive bowel sounds, no tenderness to palpation throughout  EXTREMITIES:  2+ Peripheral Pulses, No clubbing, cyanosis, or edema  SKIN: No rashes or lesions  Neuro: AOx3      LABS:                        14.5   11.27 )-----------( 252      ( 2021 05:43 )             43.8         144  |  113<H>  |  38<H>  ----------------------------<  117<H>  4.2   |  26  |  0.90    Ca    9.2      2021 05:43  Phos  3.1       Mg     2.3         TPro  6.3  /  Alb  3.1<L>  /  TBili  0.7  /  DBili  x   /  AST  84<H>  /  ALT  63<H>  /  AlkPhos  61        Urinalysis Basic - ( 2021 00:47 )    Color: Yellow / Appearance: Cloudy / S.020 / pH: x  Gluc: x / Ketone: Trace mg/dL  / Bili: Negative / Urobili: 4.0 E.U./dL   Blood: x / Protein: 30 mg/dL / Nitrite: NEGATIVE   Leuk Esterase: Moderate / RBC: 5-10 /HPF / WBC Many /HPF   Sq Epi: x / Non Sq Epi: 0-5 /HPF / Bacteria: Many /HPF          RADIOLOGY & ADDITIONAL TESTS:    MEDICATIONS  (STANDING):  amLODIPine   Tablet 10 milliGRAM(s) Oral every 24 hours  cefTRIAXone   IVPB 1000 milliGRAM(s) IV Intermittent every 24 hours  dexAMETHasone  IVPB 10 milliGRAM(s) IV Intermittent every 24 hours  diazepam    Tablet 10 milliGRAM(s) Oral daily  enoxaparin Injectable 40 milliGRAM(s) SubCutaneous every 24 hours  insulin lispro (ADMELOG) corrective regimen sliding scale   SubCutaneous every 6 hours  mirtazapine 30 milliGRAM(s) Oral at bedtime  pantoprazole  Injectable 40 milliGRAM(s) IV Push every 24 hours  remdesivir  IVPB   IV Intermittent   remdesivir  IVPB 100 milliGRAM(s) IV Intermittent every 24 hours    MEDICATIONS  (PRN):  acetaminophen     Tablet .. 650 milliGRAM(s) Oral every 6 hours PRN Temp greater or equal to 38C (100.4F), Mild Pain (1 - 3)  LORazepam   Injectable 2 milliGRAM(s) IV Push every 2 hours PRN Symptom-triggered: 2 point increase in CIWA -Ar score and a total score of 7 or LESS

## 2021-11-07 NOTE — DIETITIAN INITIAL EVALUATION ADULT. - PROBLEM SELECTOR PLAN 7
CK ~10,000 at [place].  s/p ~2.5 L NS    Plan:  -c/w  ml/hr for 12 hours  -continue to trend CK and renal function  -f/u UA (UA at [place] was dirty)

## 2021-11-07 NOTE — DIETITIAN INITIAL EVALUATION ADULT. - PROBLEM SELECTOR PLAN 6
Likely i/s/o covid pna. Pt is a poor historian, so it's unclear if he lost consciousness.     Plan:  -Obtain further collateral from family  -Considering ordering further fall workup depending on clinical status and collateral info

## 2021-11-07 NOTE — DIETITIAN INITIAL EVALUATION ADULT. - OTHER CALCULATIONS
IBW used for calculations as pt >100% IBW in critical care setting (118%), adjusted for age, COVID, fluids per team

## 2021-11-07 NOTE — DIETITIAN INITIAL EVALUATION ADULT. - PROBLEM SELECTOR PLAN 3
D-dimer 988  Pt's family refused CT PE at [place]    Plan:  -c/w lovenox 80mg BID (full dose)  -f/u TTE to assess for right heart strain  -Consider CT PE vs V/Q scan   -f/u repeat D-Dimer

## 2021-11-07 NOTE — DIETITIAN INITIAL EVALUATION ADULT. - PROBLEM SELECTOR PLAN 2
CXR consistent with covid-pna. Pt unvaccinated.   s/p one dose decadron in ED  Not a candidate for Remdesivir given transaminitis    Plan:  -c/w decadron 6mg IV daily for 10 day course (11/2 - )  -c/w low dose insulin sliding scale and pantoprazole daily while on steroids   -c/w covid droplet and airborne precautions  -c/w oxygen by [place] as needed

## 2021-11-07 NOTE — DIETITIAN INITIAL EVALUATION ADULT. - PROBLEM SELECTOR PLAN 8
Pt with elevated AST and ALT at admission  2/2 covid vs rhabdo vs unknown history    Plan:  -Obtain further medical hx from family and/or PCP  -Continue to trend AST/ALT  -Consider hep panel

## 2021-11-07 NOTE — PROGRESS NOTE ADULT - ASSESSMENT
71 year old male with PMx of HTN, depression, chronic back pain, transferred from [facility], where he was admitted on 11/2 after an unwitnessed fall (down for >1 day) and found to have acute hypoxic respiratory failure secondary to COVID-19, altered mental status and rhabdomyolysis. Due to increased work of breathing and oxygen requirements patient was upgraded to MICU for closer monitoring.     NEURO  #COVID encephalopathy, improving  -pt slightly somnolent in the morning, however, then improved  -pt now AOx3  -will continue to monitor    PULM  #Acute Hypoxic Respiratory failure 2/2 COVID-19  -overnight, was on bipap  -CXR this AM with continued bilateral hazy opacities  -this AM, was able to transition to HFNC  -plan for cpap 1 hour q4h  - cw Dexamethasone 10 mg   - cw Remdesivir    - cw Lovenox for VTE prophylaxis  - patient may prone as tolerated  - supportive care     #COVID-19 Pneumonia  - CXR with bilateral hazy opacities c/w viral COVID PNA  - no indication for antibiotics at this time, procalcitonin negative but pt spiked a fever T: 101  -CXR this AM with continued bilateral hazy opacities  - f/u blood culture    CARDIO  #Sinus tachycardia  - likely due to increased metabolic demands i/s/o COVID  infection  - echo with EF 55%, mild tricuspid regurg   -plan was to order CTPE to r/o PE, however, family does not wish for CT to be done  - f/u LE USG to rule out DVT  -TSH WNL  - continuous telemetry monitoring    #Type II MI  - likely i/so demand ischemia 2/2 tachycardia  - troponin down-trended  - EKG without ST/T wave changes    #HTN  - continue amlodipine with parameters  - will hold home Losartan-HCTZ for now    GI  -protonix    #transaminitis  -likely secondary to sepsis  -now downtrending  - monitor LFT's with daily labs  - avoid hepatotoxic agents      #urinary retention  - BPH vs. benzodiazapine induced vs UTI  -overnight requiring straight cath x3  -continue to monitor  -treat UTI with CTX    RENAL   #rhabdomyolysis   - resolving  - CK 10.961 on admission, downtrended to 500 after IVF  -continue to monitor  -creatinine WNL    ID  as per plan under pulm for Covid    #UTI  -patient denies dysuria or increased urinary frequency, however, UA + with associated urinary retention  -UCx sent -f/u with results  -currently treating with CTX 1g q24h for 7 day course    ENDO  #Prediabetes  - A1C 5.7%  - ISS as per protocol given steroid induced hyperglycemia  - stress lifestyle modifications     MSK  #chronic back pain  - would avoid adding home valium and opioids at this time due to risk of CNS depression    F: None  E: Replete for K > 4, Mg > 2  N: DASH/TLC  DVT: Lovenox   Dispo: MICU

## 2021-11-07 NOTE — DIETITIAN INITIAL EVALUATION ADULT. - PROBLEM SELECTOR PLAN 9
Pt possibly on hydrochlorothiazide, amlodipine and losartan (per outpatient med review). Doses unknown.    Plan:  -Med rec in AM and restart as necessary

## 2021-11-07 NOTE — DIETITIAN INITIAL EVALUATION ADULT. - ADD RECOMMEND
[place] 
\[facility] [place] [facility]\"" EMERGENCY DEPT 
[address] [id number] 
[phone] Work/School Note Date: 9/21/2018 To Whom It May concern: 
 
Nima Martin was seen and treated today in the emergency room by the following provider(s): 
Attending Provider: Hortencia Ortiz MD.   
 
Nima Martin was accompanied by Mr Valente Cambridge who will be unable to be at work tonight. Sincerely, Hortencia Ortiz MD 
 
 
 

1. Continue minced and moist, DASH diet +Ensure Enlive QD (350kcal/20gpro) 2. Monitor tolerance to PO and provide assistance with meals prn 3. Trend biweekly wts 4. MVI, zinc, vit C for wound healing 5. RD to remain available prn

## 2021-11-07 NOTE — DIETITIAN INITIAL EVALUATION ADULT. - PROBLEM SELECTOR PLAN 5
Trop-I 247 then 244 after 6 hrs at [place]  EKG at [place] reportedly showed RBBB  Pt asymptomatic     Plan:  -f/u repeat EKG  -f/u cardiac enzymes

## 2021-11-08 LAB
ALBUMIN SERPL ELPH-MCNC: 2.9 G/DL — LOW (ref 3.3–5)
ALP SERPL-CCNC: 64 U/L — SIGNIFICANT CHANGE UP (ref 40–120)
ALT FLD-CCNC: 58 U/L — HIGH (ref 10–45)
ANION GAP SERPL CALC-SCNC: 10 MMOL/L — SIGNIFICANT CHANGE UP (ref 5–17)
AST SERPL-CCNC: 64 U/L — HIGH (ref 10–40)
BILIRUB SERPL-MCNC: 0.7 MG/DL — SIGNIFICANT CHANGE UP (ref 0.2–1.2)
BLD GP AB SCN SERPL QL: NEGATIVE — SIGNIFICANT CHANGE UP
BUN SERPL-MCNC: 34 MG/DL — HIGH (ref 7–23)
CALCIUM SERPL-MCNC: 9.4 MG/DL — SIGNIFICANT CHANGE UP (ref 8.4–10.5)
CHLORIDE SERPL-SCNC: 116 MMOL/L — HIGH (ref 96–108)
CO2 SERPL-SCNC: 22 MMOL/L — SIGNIFICANT CHANGE UP (ref 22–31)
CREAT SERPL-MCNC: 0.76 MG/DL — SIGNIFICANT CHANGE UP (ref 0.5–1.3)
GLUCOSE BLDC GLUCOMTR-MCNC: 112 MG/DL — HIGH (ref 70–99)
GLUCOSE BLDC GLUCOMTR-MCNC: 133 MG/DL — HIGH (ref 70–99)
GLUCOSE BLDC GLUCOMTR-MCNC: 188 MG/DL — HIGH (ref 70–99)
GLUCOSE BLDC GLUCOMTR-MCNC: 240 MG/DL — HIGH (ref 70–99)
GLUCOSE SERPL-MCNC: 102 MG/DL — HIGH (ref 70–99)
HCT VFR BLD CALC: 43.9 % — SIGNIFICANT CHANGE UP (ref 39–50)
HGB BLD-MCNC: 14.8 G/DL — SIGNIFICANT CHANGE UP (ref 13–17)
MAGNESIUM SERPL-MCNC: 2.1 MG/DL — SIGNIFICANT CHANGE UP (ref 1.6–2.6)
MCHC RBC-ENTMCNC: 29.8 PG — SIGNIFICANT CHANGE UP (ref 27–34)
MCHC RBC-ENTMCNC: 33.7 GM/DL — SIGNIFICANT CHANGE UP (ref 32–36)
MCV RBC AUTO: 88.3 FL — SIGNIFICANT CHANGE UP (ref 80–100)
NRBC # BLD: 0 /100 WBCS — SIGNIFICANT CHANGE UP (ref 0–0)
PHOSPHATE SERPL-MCNC: 2.6 MG/DL — SIGNIFICANT CHANGE UP (ref 2.5–4.5)
PLATELET # BLD AUTO: 224 K/UL — SIGNIFICANT CHANGE UP (ref 150–400)
POTASSIUM SERPL-MCNC: 3.7 MMOL/L — SIGNIFICANT CHANGE UP (ref 3.5–5.3)
POTASSIUM SERPL-SCNC: 3.7 MMOL/L — SIGNIFICANT CHANGE UP (ref 3.5–5.3)
PROT SERPL-MCNC: 6.5 G/DL — SIGNIFICANT CHANGE UP (ref 6–8.3)
RBC # BLD: 4.97 M/UL — SIGNIFICANT CHANGE UP (ref 4.2–5.8)
RBC # FLD: 14.2 % — SIGNIFICANT CHANGE UP (ref 10.3–14.5)
RH IG SCN BLD-IMP: POSITIVE — SIGNIFICANT CHANGE UP
SODIUM SERPL-SCNC: 148 MMOL/L — HIGH (ref 135–145)
WBC # BLD: 11.76 K/UL — HIGH (ref 3.8–10.5)
WBC # FLD AUTO: 11.76 K/UL — HIGH (ref 3.8–10.5)

## 2021-11-08 PROCEDURE — 93970 EXTREMITY STUDY: CPT | Mod: 26

## 2021-11-08 PROCEDURE — 99233 SBSQ HOSP IP/OBS HIGH 50: CPT | Mod: GC

## 2021-11-08 RX ORDER — DIAZEPAM 5 MG
5 TABLET ORAL AT BEDTIME
Refills: 0 | Status: DISCONTINUED | OUTPATIENT
Start: 2021-11-08 | End: 2021-11-08

## 2021-11-08 RX ORDER — SODIUM CHLORIDE 9 MG/ML
1000 INJECTION, SOLUTION INTRAVENOUS
Refills: 0 | Status: DISCONTINUED | OUTPATIENT
Start: 2021-11-08 | End: 2021-11-09

## 2021-11-08 RX ORDER — SODIUM CHLORIDE 9 MG/ML
500 INJECTION, SOLUTION INTRAVENOUS ONCE
Refills: 0 | Status: COMPLETED | OUTPATIENT
Start: 2021-11-08 | End: 2021-11-08

## 2021-11-08 RX ORDER — CHLORHEXIDINE GLUCONATE 213 G/1000ML
1 SOLUTION TOPICAL
Refills: 0 | Status: DISCONTINUED | OUTPATIENT
Start: 2021-11-08 | End: 2021-11-15

## 2021-11-08 RX ORDER — LOSARTAN POTASSIUM 100 MG/1
50 TABLET, FILM COATED ORAL DAILY
Refills: 0 | Status: DISCONTINUED | OUTPATIENT
Start: 2021-11-08 | End: 2021-11-09

## 2021-11-08 RX ORDER — DICLOFENAC SODIUM 30 MG/G
0 GEL TOPICAL
Qty: 0 | Refills: 0 | DISCHARGE

## 2021-11-08 RX ORDER — DIAZEPAM 5 MG
2 TABLET ORAL ONCE
Refills: 0 | Status: DISCONTINUED | OUTPATIENT
Start: 2021-11-08 | End: 2021-11-08

## 2021-11-08 RX ADMIN — CEFTRIAXONE 100 MILLIGRAM(S): 500 INJECTION, POWDER, FOR SOLUTION INTRAMUSCULAR; INTRAVENOUS at 11:22

## 2021-11-08 RX ADMIN — PANTOPRAZOLE SODIUM 40 MILLIGRAM(S): 20 TABLET, DELAYED RELEASE ORAL at 06:51

## 2021-11-08 RX ADMIN — MIRTAZAPINE 30 MILLIGRAM(S): 45 TABLET, ORALLY DISINTEGRATING ORAL at 22:46

## 2021-11-08 RX ADMIN — TAMSULOSIN HYDROCHLORIDE 0.4 MILLIGRAM(S): 0.4 CAPSULE ORAL at 22:46

## 2021-11-08 RX ADMIN — Medication 2 MILLIGRAM(S): at 08:58

## 2021-11-08 RX ADMIN — SODIUM CHLORIDE 100 MILLILITER(S): 9 INJECTION, SOLUTION INTRAVENOUS at 10:39

## 2021-11-08 RX ADMIN — Medication 650 MILLIGRAM(S): at 17:23

## 2021-11-08 RX ADMIN — ENOXAPARIN SODIUM 40 MILLIGRAM(S): 100 INJECTION SUBCUTANEOUS at 07:11

## 2021-11-08 RX ADMIN — Medication 1 TABLET(S): at 22:46

## 2021-11-08 RX ADMIN — SODIUM CHLORIDE 1000 MILLILITER(S): 9 INJECTION, SOLUTION INTRAVENOUS at 10:39

## 2021-11-08 RX ADMIN — AMLODIPINE BESYLATE 10 MILLIGRAM(S): 2.5 TABLET ORAL at 07:11

## 2021-11-08 RX ADMIN — Medication 102 MILLIGRAM(S): at 14:24

## 2021-11-08 RX ADMIN — Medication 2: at 11:21

## 2021-11-08 RX ADMIN — Medication 650 MILLIGRAM(S): at 19:08

## 2021-11-08 RX ADMIN — REMDESIVIR 500 MILLIGRAM(S): 5 INJECTION INTRAVENOUS at 15:13

## 2021-11-08 RX ADMIN — Medication 1 TABLET(S): at 13:49

## 2021-11-08 NOTE — PROGRESS NOTE ADULT - SUBJECTIVE AND OBJECTIVE BOX
NP Note discussed with  Primary Attending    Patient is a 71y old  Male who presents with a chief complaint of Acute hypoxic respiratory failure 2/2 covid pna (2021 17:19)      INTERVAL HPI/OVERNIGHT EVENTS: on CPAP overnight - down-titrated to HFNC in AM, hemodynamically stable   MEDICATIONS  (STANDING):  amLODIPine   Tablet 10 milliGRAM(s) Oral every 24 hours  chlorhexidine 2% Cloths 1 Application(s) Topical <User Schedule>  dexAMETHasone  IVPB 10 milliGRAM(s) IV Intermittent every 24 hours  dextrose 5%. 1000 milliLiter(s) (100 mL/Hr) IV Continuous <Continuous>  enoxaparin Injectable 40 milliGRAM(s) SubCutaneous every 24 hours  insulin lispro (ADMELOG) corrective regimen sliding scale   SubCutaneous every 6 hours  mirtazapine 30 milliGRAM(s) Oral at bedtime  pantoprazole  Injectable 40 milliGRAM(s) IV Push every 24 hours  remdesivir  IVPB   IV Intermittent   remdesivir  IVPB 100 milliGRAM(s) IV Intermittent every 24 hours  tamsulosin 0.4 milliGRAM(s) Oral at bedtime  trimethoprim  160 mG/sulfamethoxazole 800 mG 1 Tablet(s) Oral every 12 hours    MEDICATIONS  (PRN):  acetaminophen     Tablet .. 650 milliGRAM(s) Oral every 6 hours PRN Temp greater or equal to 38C (100.4F), Mild Pain (1 - 3)      __________________________________________________  REVIEW OF SYSTEMS: ROS limited 2/2 patient's mental status     CONSTITUTIONAL: No fever,   EYES: no acute visual disturbances  NECK: No pain or stiffness  RESPIRATORY: + productive cough   CARDIOVASCULAR: No chest pain, no palpitations  GASTROINTESTINAL: No pain. No nausea or vomiting; No diarrhea   NEUROLOGICAL: No headache or numbness, no tremors  MUSCULOSKELETAL: No joint pain, no muscle pain  GENITOURINARY: no dysuria, no frequency, no hesitancy  PSYCHIATRY: no depression , no anxiety  ALL OTHER  ROS negative        Vital Signs Last 24 Hrs  T(C): 37.3 (2021 10:33), Max: 38.2 (2021 16:00)  T(F): 99.1 (2021 10:33), Max: 100.8 (2021 16:00)  HR: 96 (2021 12:58) (76 - 106)  BP: 136/78 (2021 11:00) (116/81 - 164/94)  BP(mean): 101 (2021 11:00) (92 - 121)  RR: 21 (2021 12:58) (17 - 26)  SpO2: 90% (2021 12:58) (90% - 100%)    ________________________________________________  PHYSICAL EXAM:  GENERAL: NAD  HEENT: Normocephalic;  conjunctivae and sclerae clear; dry mucous membranes   NECK : supple  CHEST/LUNG: course breath sounds    HEART: S1 S2  regular; no murmurs, gallops or rubs  ABDOMEN: Soft, Nontender, Nondistended; Bowel sounds present  EXTREMITIES: no cyanosis; no calf tenderness, trace LE edema  SKIN: warm and dry; + abrasion to left shoulder   NERVOUS SYSTEM:  Awake and alert; Oriented  to person & time, waxing and waning mental status. CN II-XII grossly intact     _________________________________________________  LABS:                        14.8   11.76 )-----------( 224      ( 2021 08:35 )             43.9         148<H>  |  116<H>  |  34<H>  ----------------------------<  102<H>  3.7   |  22  |  0.76    Ca    9.4      2021 08:35  Phos  2.6     11  Mg     2.1     08    TPro  6.5  /  Alb  2.9<L>  /  TBili  0.7  /  DBili  x   /  AST  64<H>  /  ALT  58<H>  /  AlkPhos  64  08      Urinalysis Basic - ( 2021 00:47 )    Color: Yellow / Appearance: Cloudy / S.020 / pH: x  Gluc: x / Ketone: Trace mg/dL  / Bili: Negative / Urobili: 4.0 E.U./dL   Blood: x / Protein: 30 mg/dL / Nitrite: NEGATIVE   Leuk Esterase: Moderate / RBC: 5-10 /HPF / WBC Many /HPF   Sq Epi: x / Non Sq Epi: 0-5 /HPF / Bacteria: Many /HPF      CAPILLARY BLOOD GLUCOSE      POCT Blood Glucose.: 240 mg/dL (2021 11:13)  POCT Blood Glucose.: 112 mg/dL (2021 06:29)  POCT Blood Glucose.: 126 mg/dL (2021 22:48)  POCT Blood Glucose.: 131 mg/dL (2021 17:29)        RADIOLOGY & ADDITIONAL TESTS:    Imaging  Reviewed:  YES    Consultant(s) Notes Reviewed:   YES      Plan of care was discussed with patient and /or primary care giver; all questions and concerns were addressed

## 2021-11-08 NOTE — PROGRESS NOTE ADULT - ASSESSMENT
71 year old male with PMx of HTN, depression, chronic back pain who was transferred from [facility], where he was admitted on 11/2 after an unwitnessed fall (down for >1 day) and found to have acute hypoxic respiratory failure secondary to COVID-19, altered mental status and rhabdomyolysis. Due to increased work of breathing and oxygen requirements patient was upgraded to MICU for closer monitoring. Patient on HFNC and CPAP overnight, WOB has improved. Urine cultures positive for citrobacter koseri for which he is on Bactrim DS BID.     NEURO  #COVID encephalopathy   - resolving  - UTI likely contributory to patient's AMS  - met SIRS criteria 2/4  on admission  - CT head negative for acute findings   - Pt on diazepam 10 mg and Narco 7.5-325mg at home -- on hold due to somnolence  - suspect fevers are multifactorial i/s/o COVID-19 and UTI  - continue with HFNC with CPAP QHS    PULM  #Acute Hypoxic Respiratory failure 2/2 COVID-19  - continue with HFNC & CPAP at night, down-titrate FiO2 as tolerated  - not a candidate for Tocilizumab   - continue Dexamethasone 10 mg & Remdesivir    - Lovenox for VTE prophylaxis  - patient may prone as tolerated  - supportive care     #COVID-19 Pneumonia  - CXR with bilateral hazy opacities c/w viral COVID PNA  - no indication for antibiotics at this time, procalcitonin negative  -monitor WBC, fever curve   - cultures NGTD     CARDIO  #Sinus tachycardia  - likely due to increased metabolic demands i/s/o COVID infection  - echo with EF 55%, mild tricuspid regurg   - f/u LE USG to rule out DVT  - continuous telemetry monitoring  - TSH nornal     #Type II MI  - likely i/so demand ischemia 2/2 tachycardia  - troponin down-trended  - EKG without ST/T wave changes    #HTN  - continue amlodipine with parameters  - will hold home Losartan-HCTZ for now    GI  #transaminitis  - due rhabdomyolysis   - monitor LFT's with daily labs  - avoid hepatotoxic agents      #urinary retention  - BPH vs. benzodiazapine induced   - continue with tamsulosin  - continue with posey catheter as patient in pain during straight cath     RENAL   #hypernatremia  - i/s/o hypovolemia  - with concomitant hyperchloremia and elevated BUN  - TBW deficit ~ 2.1L  - s/p 500 mL bonus of D5W  - c/w D5W gtt  - monitor electrolytes, I's and O's     #rhabdomyolysis   - resolving  - CK 10.961 on admission, downtrended to 500 after IVF  - monitor SCr, LFT's     ID  #Citrobacter koseri UTI  - continue with Bactrim DS BID  - f/u final sensitivities     ENDO  #Prediabetes  - A1C 5.7%  - ISS as per protocol given steroid induced hyperglycemia  - stress lifestyle modifications     MSK  #chronic back pain  - would avoid adding home opioids at this time due to risk of oversedation  - Valium 2 mg PO PRN      F: None  E: Replete for K > 4, Mg > 2  N: DASH/TLC  DVT: Lovenox  71 year old male with PMx of HTN, depression, chronic back pain who was transferred from [facility], where he was admitted on 11/2 after an unwitnessed fall (down for >1 day) and found to have acute hypoxic respiratory failure secondary to COVID-19, altered mental status and rhabdomyolysis. Due to increased work of breathing and oxygen requirements patient was upgraded to MICU for closer monitoring. Patient on HFNC and CPAP overnight, WOB has improved. Urine cultures positive for citrobacter koseri for which he is on Bactrim DS BID.     NEURO  #COVID encephalopathy   - resolving  - UTI likely contributory to patient's AMS  - met SIRS criteria 2/4  on admission  - CT head negative for acute findings   - Pt on diazepam 10 mg and Narco 7.5-325mg at home -- on hold due to somnolence  - suspect fevers are multifactorial i/s/o COVID-19 and UTI  - continue with HFNC with CPAP QHS    PULM  #Acute Hypoxic Respiratory failure 2/2 COVID-19  - continue with HFNC & CPAP at night, down-titrate FiO2 as tolerated  - not a candidate for Tocilizumab   - continue Dexamethasone 10 mg & Remdesivir    - monitor inflammatory markers q48h  - Lovenox for VTE prophylaxis  - patient may prone as tolerated  - supportive care     #COVID-19 Pneumonia  - CXR with bilateral hazy opacities c/w viral COVID PNA  - no indication for antibiotics at this time, procalcitonin negative  -monitor WBC, fever curve   - cultures NGTD     CARDIO  #Sinus tachycardia  - likely due to increased metabolic demands i/s/o COVID infection  - echo with EF 55%, mild tricuspid regurg   - f/u LE USG to rule out DVT  - continuous telemetry monitoring  - TSH nornal     #Type II MI  - likely i/so demand ischemia 2/2 tachycardia  - troponin down-trended  - EKG without ST/T wave changes    #HTN  - continue amlodipine with parameters  - will hold home Losartan-HCTZ for now    GI  #transaminitis  - due rhabdomyolysis   - monitor LFT's with daily labs  - avoid hepatotoxic agents      #urinary retention  - BPH vs. benzodiazapine induced   - continue with tamsulosin  - continue with posey catheter as patient in pain during straight cath     RENAL   #hypernatremia  - i/s/o hypovolemia  - with concomitant hyperchloremia and elevated BUN  - TBW deficit ~ 2.1L  - s/p 500 mL bonus of D5W  - c/w D5W gtt  - monitor electrolytes, I's and O's     #rhabdomyolysis   - resolving  - CK 10.961 on admission, downtrended to 500 after IVF  - monitor SCr, LFT's     ID  #Citrobacter koseri UTI  - continue with Bactrim DS BID  - f/u final sensitivities     ENDO  #Prediabetes  - A1C 5.7%  - ISS as per protocol given steroid induced hyperglycemia  - stress lifestyle modifications     MSK  #chronic back pain  - would avoid adding home opioids at this time due to risk of oversedation  - Valium 2 mg PO PRN      F: None  E: Replete for K > 4, Mg > 2  N: DASH/TLC  DVT: Lovenox  71 year old male with PMx of HTN, depression, chronic back pain who was transferred from [facility], where he was admitted on 11/2 after an unwitnessed fall (down for >1 day) and found to have acute hypoxic respiratory failure secondary to COVID-19, altered mental status and rhabdomyolysis. Due to increased work of breathing and oxygen requirements patient was upgraded to MICU for closer monitoring. Patient on HFNC and CPAP overnight, WOB has improved. Urine cultures positive for citrobacter koseri for which he is on Bactrim DS BID.     NEURO  #COVID encephalopathy   - resolving  - UTI likely contributory to patient's AMS  - met SIRS criteria 2/4  on admission  - CT head negative for acute findings   - Pt on diazepam 10 mg and Narco 7.5-325mg at home -- on hold due to somnolence  - suspect fevers are multifactorial i/s/o COVID-19 and UTI  - continue with HFNC with CPAP QHS    PULM  #Acute Hypoxic Respiratory failure 2/2 COVID-19  - continue with HFNC & CPAP at night, down-titrate FiO2 as tolerated  - not a candidate for Tocilizumab   - continue Dexamethasone 10 mg & Remdesivir    - monitor inflammatory markers q48h  - Lovenox for VTE prophylaxis  - patient may prone as tolerated  - supportive care     #COVID-19 Pneumonia  - CXR with bilateral hazy opacities c/w viral COVID PNA  - no indication for antibiotics at this time, procalcitonin negative  -monitor WBC, fever curve   - cultures NGTD     CARDIO  #Sinus tachycardia  - likely due to increased metabolic demands i/s/o COVID infection  - echo with EF 55%, mild tricuspid regurg   - DVT study negative  - declining CT PE study   - continuous telemetry monitoring  - TSH nornal     #Type II MI  - likely i/so demand ischemia 2/2 tachycardia  - troponin down-trended  - EKG without ST/T wave changes    #HTN  - continue amlodipine with parameters  - will hold home Losartan-HCTZ for now    GI  #transaminitis  - due rhabdomyolysis   - monitor LFT's with daily labs  - avoid hepatotoxic agents      #urinary retention  - BPH vs. benzodiazapine induced   - continue with tamsulosin  - continue with posey catheter as patient in pain during straight cath     RENAL   #hypernatremia  - i/s/o hypovolemia  - with concomitant hyperchloremia and elevated BUN  - TBW deficit ~ 2.1L  - s/p 500 mL bonus of D5W  - c/w D5W gtt  - monitor electrolytes, I's and O's     #rhabdomyolysis   - resolving  - CK 10.961 on admission, downtrended to 500 after IVF  - monitor SCr, LFT's     ID  #Citrobacter koseri UTI  - continue with Bactrim DS BID  - f/u final sensitivities     ENDO  #Prediabetes  - A1C 5.7%  - ISS as per protocol given steroid induced hyperglycemia  - stress lifestyle modifications     MSK  #chronic back pain  - would avoid adding home opioids at this time due to risk of oversedation  -Valium 2 mg PO PRN      F: None  E: Replete for K > 4, Mg > 2  N: DASH/TLC  DVT: Lovenox  71 year old male with PMx of HTN, depression, chronic back pain who was transferred from [facility], where he was admitted on 11/2 after an unwitnessed fall (down for >1 day) and found to have acute hypoxic respiratory failure secondary to COVID-19, altered mental status and rhabdomyolysis. Due to increased work of breathing and oxygen requirements patient was upgraded to MICU for closer monitoring. Patient on HFNC and CPAP overnight, WOB has improved. Urine cultures positive for citrobacter koseri for which he is on Bactrim DS BID.     NEURO  #COVID encephalopathy   - resolving  - UTI likely contributing to patient's AMS  - met SIRS criteria 2/4  on admission  - CT head negative for acute findings   - Pt on diazepam 10 mg and Narco 7.5-325mg at home -- on hold due to somnolence  - suspect fevers are multifactorial i/s/o COVID-19 and UTI  - continue with HFNC with CPAP QHS    PULM  #Acute Hypoxic Respiratory failure 2/2 COVID-19  - continue with HFNC & CPAP at night, down-titrate FiO2 as tolerated  - not a candidate for Tocilizumab   - continue Dexamethasone 10 mg & Remdesivir    - monitor inflammatory markers q48h  - Lovenox for VTE prophylaxis  - patient may prone as tolerated  - supportive care     #COVID-19 Pneumonia  - CXR with bilateral hazy opacities c/w viral COVID PNA  - no indication for antibiotics at this time, procalcitonin negative  -monitor WBC, fever curve   - cultures NGTD     CARDIO  #Sinus tachycardia  - likely due to increased metabolic demands i/s/o COVID infection  - echo with EF 55%, mild tricuspid regurg   - DVT study negative  - declining CT PE study   - continuous telemetry monitoring  - TSH nornal     #Type II MI  - likely i/so demand ischemia 2/2 tachycardia  - troponin down-trended  - EKG without ST/T wave changes    #HTN  - continue amlodipine with parameters  - will hold home Losartan-HCTZ for now    GI  #transaminitis  - due rhabdomyolysis   - monitor LFT's with daily labs  - avoid hepatotoxic agents      #urinary retention  - BPH vs. benzodiazapine induced   - continue with tamsulosin  - continue with posey catheter as patient in pain during straight cath     RENAL   #hypernatremia  - i/s/o hypovolemia  - with concomitant hyperchloremia and elevated BUN  - TBW deficit ~ 2.1L  - s/p 500 mL bonus of D5W  - c/w D5W gtt  - monitor electrolytes, I's and O's     #rhabdomyolysis   - resolving  - CK 10.961 on admission, downtrended to 500 after IVF  - monitor SCr, LFT's     ID  #Citrobacter koseri UTI  - continue with Bactrim DS BID  - f/u final sensitivities     ENDO  #Prediabetes  - A1C 5.7%  - ISS as per protocol given steroid induced hyperglycemia  - stress lifestyle modifications     MSK  #chronic back pain  - would avoid adding home opioids at this time due to risk of oversedation  -Valium 2 mg PO PRN      F: None  E: Replete for K > 4, Mg > 2  N: DASH/TLC  DVT: Lovenox  71 year old male with PMx of HTN, depression, chronic back pain who was transferred from [facility], where he was admitted on 11/2 after an unwitnessed fall (down for >1 day) and found to have acute hypoxic respiratory failure secondary to COVID-19, altered mental status and rhabdomyolysis. Due to increased work of breathing and oxygen requirements patient was upgraded to MICU for closer monitoring. Patient on HFNC and CPAP overnight, WOB has improved. Urine cultures positive for citrobacter koseri for which he is on Bactrim DS BID.     NEURO  #COVID encephalopathy   - resolving  - UTI likely contributing to patient's AMS  - met SIRS criteria 2/4  on admission  - CT head negative for acute findings   - Pt on diazepam 10 mg and Narco 7.5-325mg at home -- on hold due to somnolence  - suspect fevers are multifactorial i/s/o COVID-19 and UTI  - continue with HFNC with CPAP QHS    PULM  #Acute Hypoxic Respiratory failure 2/2 COVID-19  - continue with HFNC & CPAP at night, down-titrate FiO2 as tolerated  - not a candidate for Tocilizumab   - continue Dexamethasone 10 mg & Remdesivir    - monitor inflammatory markers q48h  - Lovenox for VTE prophylaxis  - patient may prone as tolerated  - supportive care     #COVID-19 Pneumonia  - CXR with bilateral hazy opacities c/w viral COVID PNA  - no indication for antibiotics at this time, procalcitonin negative  -monitor WBC, fever curve   - cultures NGTD     CARDIO  #Sinus tachycardia  - likely due to increased metabolic demands i/s/o COVID infection  - echo with EF 55%, mild tricuspid regurg   - DVT study negative  - declining CT PE study   - continuous telemetry monitoring  - TSH nornal     #Type II MI  - likely i/so demand ischemia 2/2 tachycardia  - troponin down-trended  - EKG without ST/T wave changes    #HTN  - continue home amlodipine 10 mg with parameters  - will add home Losartan given SBP's elevated     GI  #transaminitis  - 2/2 rhabdomyolysis   - monitor LFT's with daily labs  - avoid hepatotoxic agents      #urinary retention  - BPH vs. benzodiazapine induced   - continue with tamsulosin  - continue with posey catheter as patient in pain during straight cath     RENAL   #hypernatremia  - i/s/o hypovolemia  - with concomitant hyperchloremia and elevated BUN  - TBW deficit ~ 2.1L  - s/p 500 mL bonus of D5W  - c/w D5W gtt  - monitor electrolytes, I's and O's     #rhabdomyolysis   - resolving  - CK 10.961 on admission, downtrended to 500 after IVF  - monitor SCr, LFT's     ID  #Citrobacter koseri UTI  - continue with Bactrim DS BID  - f/u final sensitivities     ENDO  #Prediabetes  - A1C 5.7%  - ISS as per protocol given steroid induced hyperglycemia  - stress lifestyle modifications     MSK  #chronic back pain  - would avoid adding home opioids at this time due to risk of oversedation  -Valium 2 mg PO PRN      F: None  E: Replete for K > 4, Mg > 2  N: DASH/TLC  DVT: Lovenox

## 2021-11-09 DIAGNOSIS — R33.9 RETENTION OF URINE, UNSPECIFIED: ICD-10-CM

## 2021-11-09 DIAGNOSIS — R00.0 TACHYCARDIA, UNSPECIFIED: ICD-10-CM

## 2021-11-09 DIAGNOSIS — J96.01 ACUTE RESPIRATORY FAILURE WITH HYPOXIA: ICD-10-CM

## 2021-11-09 DIAGNOSIS — E87.0 HYPEROSMOLALITY AND HYPERNATREMIA: ICD-10-CM

## 2021-11-09 DIAGNOSIS — I10 ESSENTIAL (PRIMARY) HYPERTENSION: ICD-10-CM

## 2021-11-09 DIAGNOSIS — R74.8 ABNORMAL LEVELS OF OTHER SERUM ENZYMES: ICD-10-CM

## 2021-11-09 DIAGNOSIS — M54.9 DORSALGIA, UNSPECIFIED: ICD-10-CM

## 2021-11-09 DIAGNOSIS — U07.1 COVID-19: ICD-10-CM

## 2021-11-09 DIAGNOSIS — R73.03 PREDIABETES: ICD-10-CM

## 2021-11-09 DIAGNOSIS — R63.8 OTHER SYMPTOMS AND SIGNS CONCERNING FOOD AND FLUID INTAKE: ICD-10-CM

## 2021-11-09 LAB
-  AMPICILLIN/SULBACTAM: SIGNIFICANT CHANGE UP
-  AMPICILLIN: SIGNIFICANT CHANGE UP
-  CEFAZOLIN: SIGNIFICANT CHANGE UP
-  CEFEPIME: SIGNIFICANT CHANGE UP
-  CEFTRIAXONE: SIGNIFICANT CHANGE UP
-  CIPROFLOXACIN: SIGNIFICANT CHANGE UP
-  ERTAPENEM: SIGNIFICANT CHANGE UP
-  GENTAMICIN: SIGNIFICANT CHANGE UP
-  NITROFURANTOIN: SIGNIFICANT CHANGE UP
-  PIPERACILLIN/TAZOBACTAM: SIGNIFICANT CHANGE UP
-  TOBRAMYCIN: SIGNIFICANT CHANGE UP
-  TRIMETHOPRIM/SULFAMETHOXAZOLE: SIGNIFICANT CHANGE UP
ALBUMIN SERPL ELPH-MCNC: 3.1 G/DL — LOW (ref 3.3–5)
ALP SERPL-CCNC: 73 U/L — SIGNIFICANT CHANGE UP (ref 40–120)
ALT FLD-CCNC: 52 U/L — HIGH (ref 10–45)
ANION GAP SERPL CALC-SCNC: 8 MMOL/L — SIGNIFICANT CHANGE UP (ref 5–17)
AST SERPL-CCNC: 52 U/L — HIGH (ref 10–40)
BASOPHILS # BLD AUTO: 0 K/UL — SIGNIFICANT CHANGE UP (ref 0–0.2)
BASOPHILS NFR BLD AUTO: 0 % — SIGNIFICANT CHANGE UP (ref 0–2)
BILIRUB SERPL-MCNC: 0.6 MG/DL — SIGNIFICANT CHANGE UP (ref 0.2–1.2)
BUN SERPL-MCNC: 24 MG/DL — HIGH (ref 7–23)
BURR CELLS BLD QL SMEAR: PRESENT — SIGNIFICANT CHANGE UP
CALCIUM SERPL-MCNC: 9.5 MG/DL — SIGNIFICANT CHANGE UP (ref 8.4–10.5)
CHLORIDE SERPL-SCNC: 108 MMOL/L — SIGNIFICANT CHANGE UP (ref 96–108)
CO2 SERPL-SCNC: 24 MMOL/L — SIGNIFICANT CHANGE UP (ref 22–31)
CREAT SERPL-MCNC: 0.77 MG/DL — SIGNIFICANT CHANGE UP (ref 0.5–1.3)
CULTURE RESULTS: SIGNIFICANT CHANGE UP
D DIMER BLD IA.RAPID-MCNC: 2655 NG/ML DDU — HIGH
EOSINOPHIL # BLD AUTO: 0 K/UL — SIGNIFICANT CHANGE UP (ref 0–0.5)
EOSINOPHIL NFR BLD AUTO: 0 % — SIGNIFICANT CHANGE UP (ref 0–6)
FERRITIN SERPL-MCNC: 528 NG/ML — HIGH (ref 30–400)
GLUCOSE BLDC GLUCOMTR-MCNC: 101 MG/DL — HIGH (ref 70–99)
GLUCOSE BLDC GLUCOMTR-MCNC: 133 MG/DL — HIGH (ref 70–99)
GLUCOSE BLDC GLUCOMTR-MCNC: 90 MG/DL — SIGNIFICANT CHANGE UP (ref 70–99)
GLUCOSE BLDC GLUCOMTR-MCNC: 96 MG/DL — SIGNIFICANT CHANGE UP (ref 70–99)
GLUCOSE SERPL-MCNC: 130 MG/DL — HIGH (ref 70–99)
HCT VFR BLD CALC: 45 % — SIGNIFICANT CHANGE UP (ref 39–50)
HGB BLD-MCNC: 15 G/DL — SIGNIFICANT CHANGE UP (ref 13–17)
LDH SERPL L TO P-CCNC: 538 U/L — HIGH (ref 50–242)
LYMPHOCYTES # BLD AUTO: 0.64 K/UL — LOW (ref 1–3.3)
LYMPHOCYTES # BLD AUTO: 5.2 % — LOW (ref 13–44)
MAGNESIUM SERPL-MCNC: 2.2 MG/DL — SIGNIFICANT CHANGE UP (ref 1.6–2.6)
MANUAL SMEAR VERIFICATION: SIGNIFICANT CHANGE UP
MCHC RBC-ENTMCNC: 29.5 PG — SIGNIFICANT CHANGE UP (ref 27–34)
MCHC RBC-ENTMCNC: 33.3 GM/DL — SIGNIFICANT CHANGE UP (ref 32–36)
MCV RBC AUTO: 88.6 FL — SIGNIFICANT CHANGE UP (ref 80–100)
METAMYELOCYTES # FLD: 3.5 % — HIGH (ref 0–0)
METHOD TYPE: SIGNIFICANT CHANGE UP
MONOCYTES # BLD AUTO: 0.43 K/UL — SIGNIFICANT CHANGE UP (ref 0–0.9)
MONOCYTES NFR BLD AUTO: 3.5 % — SIGNIFICANT CHANGE UP (ref 2–14)
NEUTROPHILS # BLD AUTO: 10.67 K/UL — HIGH (ref 1.8–7.4)
NEUTROPHILS NFR BLD AUTO: 85.2 % — HIGH (ref 43–77)
NEUTS BAND # BLD: 1.7 % — SIGNIFICANT CHANGE UP (ref 0–8)
ORGANISM # SPEC MICROSCOPIC CNT: SIGNIFICANT CHANGE UP
ORGANISM # SPEC MICROSCOPIC CNT: SIGNIFICANT CHANGE UP
OVALOCYTES BLD QL SMEAR: SLIGHT — SIGNIFICANT CHANGE UP
PHOSPHATE SERPL-MCNC: 3.2 MG/DL — SIGNIFICANT CHANGE UP (ref 2.5–4.5)
PLAT MORPH BLD: ABNORMAL
PLATELET # BLD AUTO: 265 K/UL — SIGNIFICANT CHANGE UP (ref 150–400)
POIKILOCYTOSIS BLD QL AUTO: SIGNIFICANT CHANGE UP
POTASSIUM SERPL-MCNC: 4.4 MMOL/L — SIGNIFICANT CHANGE UP (ref 3.5–5.3)
POTASSIUM SERPL-SCNC: 4.4 MMOL/L — SIGNIFICANT CHANGE UP (ref 3.5–5.3)
PROT SERPL-MCNC: 6.6 G/DL — SIGNIFICANT CHANGE UP (ref 6–8.3)
RBC # BLD: 5.08 M/UL — SIGNIFICANT CHANGE UP (ref 4.2–5.8)
RBC # FLD: 14.1 % — SIGNIFICANT CHANGE UP (ref 10.3–14.5)
RBC BLD AUTO: SIGNIFICANT CHANGE UP
SODIUM SERPL-SCNC: 140 MMOL/L — SIGNIFICANT CHANGE UP (ref 135–145)
SPECIMEN SOURCE: SIGNIFICANT CHANGE UP
VARIANT LYMPHS # BLD: 0.9 % — SIGNIFICANT CHANGE UP (ref 0–6)
WBC # BLD: 12.28 K/UL — HIGH (ref 3.8–10.5)
WBC # FLD AUTO: 12.28 K/UL — HIGH (ref 3.8–10.5)

## 2021-11-09 PROCEDURE — 99233 SBSQ HOSP IP/OBS HIGH 50: CPT | Mod: GC

## 2021-11-09 PROCEDURE — 71045 X-RAY EXAM CHEST 1 VIEW: CPT | Mod: 26

## 2021-11-09 RX ORDER — LOSARTAN POTASSIUM 100 MG/1
50 TABLET, FILM COATED ORAL ONCE
Refills: 0 | Status: DISCONTINUED | OUTPATIENT
Start: 2021-11-09 | End: 2021-11-09

## 2021-11-09 RX ORDER — LOSARTAN POTASSIUM 100 MG/1
50 TABLET, FILM COATED ORAL DAILY
Refills: 0 | Status: DISCONTINUED | OUTPATIENT
Start: 2021-11-10 | End: 2021-11-18

## 2021-11-09 RX ADMIN — Medication 1 TABLET(S): at 10:34

## 2021-11-09 RX ADMIN — CHLORHEXIDINE GLUCONATE 1 APPLICATION(S): 213 SOLUTION TOPICAL at 06:34

## 2021-11-09 RX ADMIN — Medication 1 TABLET(S): at 22:00

## 2021-11-09 RX ADMIN — LOSARTAN POTASSIUM 50 MILLIGRAM(S): 100 TABLET, FILM COATED ORAL at 06:33

## 2021-11-09 RX ADMIN — PANTOPRAZOLE SODIUM 40 MILLIGRAM(S): 20 TABLET, DELAYED RELEASE ORAL at 23:50

## 2021-11-09 RX ADMIN — AMLODIPINE BESYLATE 10 MILLIGRAM(S): 2.5 TABLET ORAL at 06:56

## 2021-11-09 RX ADMIN — Medication 1: at 00:49

## 2021-11-09 RX ADMIN — ENOXAPARIN SODIUM 40 MILLIGRAM(S): 100 INJECTION SUBCUTANEOUS at 23:36

## 2021-11-09 RX ADMIN — ENOXAPARIN SODIUM 40 MILLIGRAM(S): 100 INJECTION SUBCUTANEOUS at 06:56

## 2021-11-09 RX ADMIN — TAMSULOSIN HYDROCHLORIDE 0.4 MILLIGRAM(S): 0.4 CAPSULE ORAL at 22:00

## 2021-11-09 RX ADMIN — PANTOPRAZOLE SODIUM 40 MILLIGRAM(S): 20 TABLET, DELAYED RELEASE ORAL at 06:33

## 2021-11-09 RX ADMIN — Medication 102 MILLIGRAM(S): at 17:24

## 2021-11-09 RX ADMIN — Medication 650 MILLIGRAM(S): at 20:16

## 2021-11-09 RX ADMIN — MIRTAZAPINE 30 MILLIGRAM(S): 45 TABLET, ORALLY DISINTEGRATING ORAL at 22:00

## 2021-11-09 RX ADMIN — SODIUM CHLORIDE 100 MILLILITER(S): 9 INJECTION, SOLUTION INTRAVENOUS at 03:32

## 2021-11-09 NOTE — PROGRESS NOTE ADULT - PROBLEM SELECTOR PLAN 9
- would avoid adding home opioids at this time due to risk of oversedation  - Valium 2 mg PO PRN - would avoid adding home opioids at this time due to risk of oversedation

## 2021-11-09 NOTE — PROGRESS NOTE ADULT - PROBLEM SELECTOR PLAN 3
- likely due to increased metabolic demands i/s/o COVID infection  - echo with EF 55%, mild tricuspid regurg   - DVT study negative  - declining CT PE study   - continuous telemetry monitoring  - TSH nornal - likely due to increased metabolic demands i/s/o COVID infection  - echo with EF 55%, mild tricuspid regurg   - DVT study negative  - declining CT PE study   - TSH normal    To do:  - continuous telemetry monitoring

## 2021-11-09 NOTE — PROGRESS NOTE ADULT - SUBJECTIVE AND OBJECTIVE BOX
OVERNIGHT EVENTS:    SUBJECTIVE / INTERVAL HPI: Patient seen and examined at bedside.     VITAL SIGNS:  Vital Signs Last 24 Hrs  T(C): 37.1 (09 Nov 2021 00:23), Max: 38 (08 Nov 2021 18:00)  T(F): 98.8 (09 Nov 2021 00:23), Max: 100.4 (08 Nov 2021 18:00)  HR: 87 (09 Nov 2021 06:17) (77 - 106)  BP: 152/94 (09 Nov 2021 06:00) (114/71 - 163/100)  BP(mean): 117 (09 Nov 2021 06:00) (87 - 125)  RR: 20 (09 Nov 2021 06:17) (17 - 30)  SpO2: 92% (09 Nov 2021 06:17) (90% - 100%)    PHYSICAL EXAM:    General: WDWN  HEENT: NC/AT; PERRL, anicteric sclera; MMM  Neck: supple  Cardiovascular: +S1/S2, RRR  Respiratory: CTA B/L; no W/R/R  Gastrointestinal: soft, NT/ND; +BSx4  Extremities: WWP; no edema, clubbing or cyanosis  Vascular: 2+ radial, DP/PT pulses B/L  Neurological: AAOx3; no focal deficits    MEDICATIONS:  MEDICATIONS  (STANDING):  amLODIPine   Tablet 10 milliGRAM(s) Oral every 24 hours  chlorhexidine 2% Cloths 1 Application(s) Topical <User Schedule>  dexAMETHasone  IVPB 10 milliGRAM(s) IV Intermittent every 24 hours  dextrose 5%. 1000 milliLiter(s) (100 mL/Hr) IV Continuous <Continuous>  enoxaparin Injectable 40 milliGRAM(s) SubCutaneous every 24 hours  insulin lispro (ADMELOG) corrective regimen sliding scale   SubCutaneous every 6 hours  losartan 50 milliGRAM(s) Oral daily  mirtazapine 30 milliGRAM(s) Oral at bedtime  pantoprazole  Injectable 40 milliGRAM(s) IV Push every 24 hours  tamsulosin 0.4 milliGRAM(s) Oral at bedtime  trimethoprim  160 mG/sulfamethoxazole 800 mG 1 Tablet(s) Oral every 12 hours    MEDICATIONS  (PRN):  acetaminophen     Tablet .. 650 milliGRAM(s) Oral every 6 hours PRN Temp greater or equal to 38C (100.4F), Mild Pain (1 - 3)      ALLERGIES:  Allergies    Cipro (Rash)    Intolerances    Cipro (Other)      LABS:                        15.0   12.28 )-----------( 265      ( 09 Nov 2021 04:38 )             45.0     11-09    140  |  108  |  24<H>  ----------------------------<  130<H>  4.4   |  24  |  0.77    Ca    9.5      09 Nov 2021 04:38  Phos  3.2     11-09  Mg     2.2     11-09    TPro  6.6  /  Alb  3.1<L>  /  TBili  0.6  /  DBili  x   /  AST  52<H>  /  ALT  52<H>  /  AlkPhos  73  11-09        CAPILLARY BLOOD GLUCOSE      POCT Blood Glucose.: 96 mg/dL (09 Nov 2021 06:58)      RADIOLOGY & ADDITIONAL TESTS: Reviewed. OVERNIGHT EVENTS: On CPAP    SUBJECTIVE / INTERVAL HPI: Patient seen and examined at bedside. He was resting comfortably and did not appear in acute distress. He still c/o SOB, chest pain, N/V, abdominal pain, diarrhea, dysuria, fever, chills, numbness or weakness.     VITAL SIGNS:  Vital Signs Last 24 Hrs  T(C): 37.1 (09 Nov 2021 00:23), Max: 38 (08 Nov 2021 18:00)  T(F): 98.8 (09 Nov 2021 00:23), Max: 100.4 (08 Nov 2021 18:00)  HR: 87 (09 Nov 2021 06:17) (77 - 106)  BP: 152/94 (09 Nov 2021 06:00) (114/71 - 163/100)  BP(mean): 117 (09 Nov 2021 06:00) (87 - 125)  RR: 20 (09 Nov 2021 06:17) (17 - 30)  SpO2: 92% (09 Nov 2021 06:17) (90% - 100%)    PHYSICAL EXAM:    GENERAL: NAD  HEENT: Normocephalic;  conjunctivae and sclerae clear; dry mucous membranes   NECK : supple  CHEST/LUNG: CTA b/l    HEART: S1 S2  regular; no murmurs, gallops or rubs  ABDOMEN: Soft, Nontender, Nondistended; Bowel sounds present  EXTREMITIES: no cyanosis; no calf tenderness, trace LE edema  SKIN: warm and dry; + abrasion to left shoulder   NERVOUS SYSTEM:  AAOx 3, no focal neurological deficit. CN II-XII grossly intact     MEDICATIONS:  MEDICATIONS  (STANDING):  amLODIPine   Tablet 10 milliGRAM(s) Oral every 24 hours  chlorhexidine 2% Cloths 1 Application(s) Topical <User Schedule>  dexAMETHasone  IVPB 10 milliGRAM(s) IV Intermittent every 24 hours  dextrose 5%. 1000 milliLiter(s) (100 mL/Hr) IV Continuous <Continuous>  enoxaparin Injectable 40 milliGRAM(s) SubCutaneous every 24 hours  insulin lispro (ADMELOG) corrective regimen sliding scale   SubCutaneous every 6 hours  losartan 50 milliGRAM(s) Oral daily  mirtazapine 30 milliGRAM(s) Oral at bedtime  pantoprazole  Injectable 40 milliGRAM(s) IV Push every 24 hours  tamsulosin 0.4 milliGRAM(s) Oral at bedtime  trimethoprim  160 mG/sulfamethoxazole 800 mG 1 Tablet(s) Oral every 12 hours    MEDICATIONS  (PRN):  acetaminophen     Tablet .. 650 milliGRAM(s) Oral every 6 hours PRN Temp greater or equal to 38C (100.4F), Mild Pain (1 - 3)      ALLERGIES:  Allergies    Cipro (Rash)    Intolerances    Cipro (Other)      LABS:                        15.0   12.28 )-----------( 265      ( 09 Nov 2021 04:38 )             45.0     11-09    140  |  108  |  24<H>  ----------------------------<  130<H>  4.4   |  24  |  0.77    Ca    9.5      09 Nov 2021 04:38  Phos  3.2     11-09  Mg     2.2     11-09    TPro  6.6  /  Alb  3.1<L>  /  TBili  0.6  /  DBili  x   /  AST  52<H>  /  ALT  52<H>  /  AlkPhos  73  11-09        CAPILLARY BLOOD GLUCOSE      POCT Blood Glucose.: 96 mg/dL (09 Nov 2021 06:58)      RADIOLOGY & ADDITIONAL TESTS: Reviewed. 71 year old male with PMx of HTN, depression, chronic back pain who was transferred from [facility], where he was admitted on 11/2 after an unwitnessed fall (down for >1 day.) He was found to have acute hypoxic respiratory failure secondary to COVID-19 pneumonia, altered mental status and rhabdomyolyis. Pt unable to recollect events or provide any medical history. Prior to this hospitalization he liveds at home with his wife and ambulated independently. He is not vaccinated for COVID. Patient complained of generalized malaise, but he denied any other pertinent symptoms including shortness breath and chest pain. Pt started having increased work of breathing and O2 requirements on 11/6 and he was subsequently transferred to MICU for further management. In the MICU, he was placed on BiPAP and had to be straight catheterized multiple times due to urine retention. Urine culture was positive for citrobacter, he was started on ceftriaxone switch to bactrim and a foleys was placed in. He was eventually weaned off to HFNC with intermittent cPAP. He was further weaned to NC 3L and was saturating 93%. Pt is stable to be stepped down to telemetry for further management.    OVERNIGHT EVENTS: On CPAP    SUBJECTIVE / INTERVAL HPI: Patient seen and examined at bedside. He was resting comfortably and did not appear in acute distress. He still c/o SOB, chest pain, N/V, abdominal pain, diarrhea, dysuria, fever, chills, numbness or weakness.     VITAL SIGNS:  Vital Signs Last 24 Hrs  T(C): 37.1 (09 Nov 2021 00:23), Max: 38 (08 Nov 2021 18:00)  T(F): 98.8 (09 Nov 2021 00:23), Max: 100.4 (08 Nov 2021 18:00)  HR: 87 (09 Nov 2021 06:17) (77 - 106)  BP: 152/94 (09 Nov 2021 06:00) (114/71 - 163/100)  BP(mean): 117 (09 Nov 2021 06:00) (87 - 125)  RR: 20 (09 Nov 2021 06:17) (17 - 30)  SpO2: 92% (09 Nov 2021 06:17) (90% - 100%)    PHYSICAL EXAM:    GENERAL: NAD  HEENT: Normocephalic;  conjunctivae and sclerae clear; dry mucous membranes   NECK : supple  CHEST/LUNG: minimal rhonchi b/l   HEART: S1 S2  regular; no murmurs, gallops or rubs  ABDOMEN: Soft, Nontender, Nondistended; Bowel sounds present  EXTREMITIES: no cyanosis; no calf tenderness, trace LE edema  SKIN: warm and dry; + abrasion to left shoulder   NERVOUS SYSTEM:  AAOx 3, no focal neurological deficit. CN II-XII grossly intact     MEDICATIONS:  MEDICATIONS  (STANDING):  amLODIPine   Tablet 10 milliGRAM(s) Oral every 24 hours  chlorhexidine 2% Cloths 1 Application(s) Topical <User Schedule>  dexAMETHasone  IVPB 10 milliGRAM(s) IV Intermittent every 24 hours  dextrose 5%. 1000 milliLiter(s) (100 mL/Hr) IV Continuous <Continuous>  enoxaparin Injectable 40 milliGRAM(s) SubCutaneous every 24 hours  insulin lispro (ADMELOG) corrective regimen sliding scale   SubCutaneous every 6 hours  losartan 50 milliGRAM(s) Oral daily  mirtazapine 30 milliGRAM(s) Oral at bedtime  pantoprazole  Injectable 40 milliGRAM(s) IV Push every 24 hours  tamsulosin 0.4 milliGRAM(s) Oral at bedtime  trimethoprim  160 mG/sulfamethoxazole 800 mG 1 Tablet(s) Oral every 12 hours    MEDICATIONS  (PRN):  acetaminophen     Tablet .. 650 milliGRAM(s) Oral every 6 hours PRN Temp greater or equal to 38C (100.4F), Mild Pain (1 - 3)      ALLERGIES:  Allergies    Cipro (Rash)    Intolerances    Cipro (Other)      LABS:                        15.0   12.28 )-----------( 265      ( 09 Nov 2021 04:38 )             45.0     11-09    140  |  108  |  24<H>  ----------------------------<  130<H>  4.4   |  24  |  0.77    Ca    9.5      09 Nov 2021 04:38  Phos  3.2     11-09  Mg     2.2     11-09    TPro  6.6  /  Alb  3.1<L>  /  TBili  0.6  /  DBili  x   /  AST  52<H>  /  ALT  52<H>  /  AlkPhos  73  11-09        CAPILLARY BLOOD GLUCOSE      POCT Blood Glucose.: 96 mg/dL (09 Nov 2021 06:58)      RADIOLOGY & ADDITIONAL TESTS: Reviewed.

## 2021-11-09 NOTE — PROGRESS NOTE ADULT - PROBLEM SELECTOR PLAN 6
- BPH vs. benzodiazapine induced   - continue with tamsulosin  - continue with posey catheter as patient in pain during straight cath   - Citrobacter koseri UTI    To do:  - continue with Bactrim DS BID  - TOV tomorrow   - monitor urine output - BPH vs. benzodiazapine induced   - continue with tamsulosin  - continue with posey catheter as patient in pain during straight cath   - Citrobacter koseri UTI    To do:  - continue with Bactrim DS BID  - TOV tomorrow   - monitor urine output post TOV - BPH vs. benzodiazapine induced   - Citrobacter koseri UTI    To do:  - continue with Bactrim DS BID  - TOV tomorrow   - monitor urine output post TOV

## 2021-11-09 NOTE — PROGRESS NOTE ADULT - PROBLEM SELECTOR PLAN 7
- i/s/o hypovolemia  - with concomitant hyperchloremia and elevated BUN  - TBW deficit ~ 2.1L  - s/p 500 mL bonus of D5W  - c/w D5W gtt  - monitor electrolytes, I's and O's - i/s/o hypovolemia  - with concomitant hyperchloremia and elevated BUN  - TBW deficit ~ 2.1L  - s/p 500 mL bonus of D5W and D5W gtt  - monitor electrolytes, I's and O's RESOLVING  - i/s/o hypovolemia  - with concomitant hyperchloremia and elevated BUN  - TBW deficit ~ 2.1L  - s/p 500 mL bonus of D5W and D5W gtt  On 11/09 Na 140

## 2021-11-09 NOTE — PROGRESS NOTE ADULT - PROBLEM SELECTOR PLAN 5
- 2/2 rhabdomyolysis   - monitor LFT's with daily labs  - avoid hepatotoxic agents RESOLVING  - 2/2 rhabdomyolysis     To do:  - monitor LFT's with daily labs  - avoid hepatotoxic agents

## 2021-11-09 NOTE — PROGRESS NOTE ADULT - PROBLEM SELECTOR PLAN 1
- resolving  - UTI likely contributing to patient's AMS  - met SIRS criteria 2/4  on admission  - CT head negative for acute findings   - Pt on diazepam 10 mg and Narco 7.5-325mg at home -- on hold due to somnolence  - suspect fevers are multifactorial i/s/o COVID-19 and UTI  - continue with HFNC with CPAP QHS - resolving  - UTI likely contributing to patient's AMS  - met SIRS criteria 2/4  on admission  - CT head negative for acute findings   - Pt on diazepam 10 mg and Narco 7.5-325mg at home -- on hold due to somnolence  - suspect fevers are multifactorial i/s/o COVID-19 and UTI  - s/p HFNC with CPAP QHS and remdesevir     To do:  - cw decadron 10 mg IV

## 2021-11-09 NOTE — PROGRESS NOTE ADULT - PROBLEM SELECTOR PLAN 8
- A1C 5.7%  - ISS as per protocol given steroid induced hyperglycemia  - stress lifestyle modifications - A1C 5.7%    To do:  - ISS as per protocol given steroid induced hyperglycemia  - monitor FSG   - stress lifestyle modifications

## 2021-11-09 NOTE — PROGRESS NOTE ADULT - ASSESSMENT
71 year old male with PMx of HTN, depression, chronic back pain who was transferred from [facility], where he was admitted on 11/2 after an unwitnessed fall (down for >1 day) and found to have acute hypoxic respiratory failure secondary to COVID-19, altered mental status and rhabdomyolysis. Due to increased work of breathing and oxygen requirements patient was upgraded to MICU for closer monitoring. Patient on HFNC and CPAP overnight, WOB has improved. Urine cultures positive for citrobacter koseri for which he is on Bactrim DS BID.

## 2021-11-09 NOTE — PROGRESS NOTE ADULT - PROBLEM SELECTOR PLAN 2
- continue with HFNC & CPAP at night, down-titrate FiO2 as tolerated  - not a candidate for Tocilizumab   - continue Dexamethasone 10 mg & Remdesivir    - monitor inflammatory markers q48h  - Lovenox for VTE prophylaxis  - patient may prone as tolerated  - supportive care     #COVID-19 Pneumonia  - CXR with bilateral hazy opacities c/w viral COVID PNA  - no indication for antibiotics at this time, procalcitonin negative  - monitor WBC, fever curve   - cultures NGTD - continue with HFNC & CPAP at night, down-titrate FiO2 as tolerated  - COVID-19 Pneumonia: CXR with bilateral hazy opacities c/w viral COVID PNA  - not a candidate for Tocilizumab, no indication for antibiotics at this time, procalcitonin negative  - s/p Remdesivir      To do:  - continue Dexamethasone 10 mg   - monitor inflammatory markers q48h  - Lovenox for VTE prophylaxis  - patient may prone as tolerated  - supportive care, monitor WBC, fever curve, cultures NGTD - continue with HFNC & CPAP at night, down-titrate FiO2 as tolerated  - COVID-19 Pneumonia: CXR with bilateral hazy opacities c/w viral COVID PNA  - not a candidate for Tocilizumab, no indication for antibiotics at this time, procalcitonin negative  - s/p Remdesivir      To do:  - cw NC with intermittent cPAP  - continue Dexamethasone 10 mg   - monitor inflammatory markers q48h  - Lovenox for VTE prophylaxis  - patient may prone as tolerated  - supportive care, monitor WBC, fever curve, cultures NGTD

## 2021-11-09 NOTE — PROGRESS NOTE ADULT - PROBLEM SELECTOR PLAN 10
F: none  E: replete prn  N: DASH diet   A: lovenox 40mg daily   GI: Pantoprazole 40 mg daily   Dispo: MICU F: none  E: replete prn  N: DASH diet   A: lovenox 40mg daily   GI: Pantoprazole 40 mg daily   Dispo: telemetry

## 2021-11-10 LAB
ALBUMIN SERPL ELPH-MCNC: 2.7 G/DL — LOW (ref 3.3–5)
ALP SERPL-CCNC: 72 U/L — SIGNIFICANT CHANGE UP (ref 40–120)
ALT FLD-CCNC: 36 U/L — SIGNIFICANT CHANGE UP (ref 10–45)
ANION GAP SERPL CALC-SCNC: 10 MMOL/L — SIGNIFICANT CHANGE UP (ref 5–17)
AST SERPL-CCNC: 38 U/L — SIGNIFICANT CHANGE UP (ref 10–40)
BASOPHILS # BLD AUTO: 0.03 K/UL — SIGNIFICANT CHANGE UP (ref 0–0.2)
BASOPHILS NFR BLD AUTO: 0.2 % — SIGNIFICANT CHANGE UP (ref 0–2)
BILIRUB SERPL-MCNC: 0.6 MG/DL — SIGNIFICANT CHANGE UP (ref 0.2–1.2)
BUN SERPL-MCNC: 28 MG/DL — HIGH (ref 7–23)
CALCIUM SERPL-MCNC: 9.5 MG/DL — SIGNIFICANT CHANGE UP (ref 8.4–10.5)
CHLORIDE SERPL-SCNC: 111 MMOL/L — HIGH (ref 96–108)
CO2 SERPL-SCNC: 20 MMOL/L — LOW (ref 22–31)
CREAT SERPL-MCNC: 0.9 MG/DL — SIGNIFICANT CHANGE UP (ref 0.5–1.3)
D DIMER BLD IA.RAPID-MCNC: 3549 NG/ML DDU — HIGH
EOSINOPHIL # BLD AUTO: 0.01 K/UL — SIGNIFICANT CHANGE UP (ref 0–0.5)
EOSINOPHIL NFR BLD AUTO: 0.1 % — SIGNIFICANT CHANGE UP (ref 0–6)
GLUCOSE BLDC GLUCOMTR-MCNC: 115 MG/DL — HIGH (ref 70–99)
GLUCOSE BLDC GLUCOMTR-MCNC: 152 MG/DL — HIGH (ref 70–99)
GLUCOSE BLDC GLUCOMTR-MCNC: 97 MG/DL — SIGNIFICANT CHANGE UP (ref 70–99)
GLUCOSE SERPL-MCNC: 121 MG/DL — HIGH (ref 70–99)
HCT VFR BLD CALC: 43.1 % — SIGNIFICANT CHANGE UP (ref 39–50)
HGB BLD-MCNC: 14.7 G/DL — SIGNIFICANT CHANGE UP (ref 13–17)
IMM GRANULOCYTES NFR BLD AUTO: 3.2 % — HIGH (ref 0–1.5)
LYMPHOCYTES # BLD AUTO: 0.83 K/UL — LOW (ref 1–3.3)
LYMPHOCYTES # BLD AUTO: 6.2 % — LOW (ref 13–44)
MAGNESIUM SERPL-MCNC: 2.7 MG/DL — HIGH (ref 1.6–2.6)
MCHC RBC-ENTMCNC: 30.4 PG — SIGNIFICANT CHANGE UP (ref 27–34)
MCHC RBC-ENTMCNC: 34.1 GM/DL — SIGNIFICANT CHANGE UP (ref 32–36)
MCV RBC AUTO: 89.2 FL — SIGNIFICANT CHANGE UP (ref 80–100)
MONOCYTES # BLD AUTO: 0.5 K/UL — SIGNIFICANT CHANGE UP (ref 0–0.9)
MONOCYTES NFR BLD AUTO: 3.7 % — SIGNIFICANT CHANGE UP (ref 2–14)
NEUTROPHILS # BLD AUTO: 11.66 K/UL — HIGH (ref 1.8–7.4)
NEUTROPHILS NFR BLD AUTO: 86.6 % — HIGH (ref 43–77)
NRBC # BLD: 0 /100 WBCS — SIGNIFICANT CHANGE UP (ref 0–0)
PHOSPHATE SERPL-MCNC: 3.5 MG/DL — SIGNIFICANT CHANGE UP (ref 2.5–4.5)
PLATELET # BLD AUTO: 293 K/UL — SIGNIFICANT CHANGE UP (ref 150–400)
POTASSIUM SERPL-MCNC: 4.6 MMOL/L — SIGNIFICANT CHANGE UP (ref 3.5–5.3)
POTASSIUM SERPL-SCNC: 4.6 MMOL/L — SIGNIFICANT CHANGE UP (ref 3.5–5.3)
PROT SERPL-MCNC: 6.3 G/DL — SIGNIFICANT CHANGE UP (ref 6–8.3)
RBC # BLD: 4.83 M/UL — SIGNIFICANT CHANGE UP (ref 4.2–5.8)
RBC # FLD: 14.4 % — SIGNIFICANT CHANGE UP (ref 10.3–14.5)
SODIUM SERPL-SCNC: 141 MMOL/L — SIGNIFICANT CHANGE UP (ref 135–145)
WBC # BLD: 13.46 K/UL — HIGH (ref 3.8–10.5)
WBC # FLD AUTO: 13.46 K/UL — HIGH (ref 3.8–10.5)

## 2021-11-10 PROCEDURE — 71275 CT ANGIOGRAPHY CHEST: CPT | Mod: 26

## 2021-11-10 PROCEDURE — 99233 SBSQ HOSP IP/OBS HIGH 50: CPT | Mod: GC

## 2021-11-10 PROCEDURE — 71045 X-RAY EXAM CHEST 1 VIEW: CPT | Mod: 26

## 2021-11-10 RX ORDER — ENOXAPARIN SODIUM 100 MG/ML
80 INJECTION SUBCUTANEOUS EVERY 12 HOURS
Refills: 0 | Status: DISCONTINUED | OUTPATIENT
Start: 2021-11-10 | End: 2021-11-15

## 2021-11-10 RX ADMIN — MIRTAZAPINE 30 MILLIGRAM(S): 45 TABLET, ORALLY DISINTEGRATING ORAL at 22:07

## 2021-11-10 RX ADMIN — TAMSULOSIN HYDROCHLORIDE 0.4 MILLIGRAM(S): 0.4 CAPSULE ORAL at 22:07

## 2021-11-10 RX ADMIN — LOSARTAN POTASSIUM 50 MILLIGRAM(S): 100 TABLET, FILM COATED ORAL at 11:49

## 2021-11-10 RX ADMIN — PANTOPRAZOLE SODIUM 40 MILLIGRAM(S): 20 TABLET, DELAYED RELEASE ORAL at 23:01

## 2021-11-10 RX ADMIN — Medication 1 TABLET(S): at 22:38

## 2021-11-10 RX ADMIN — Medication 102 MILLIGRAM(S): at 15:59

## 2021-11-10 RX ADMIN — Medication 1 TABLET(S): at 11:49

## 2021-11-10 RX ADMIN — Medication 1: at 22:40

## 2021-11-10 RX ADMIN — Medication 650 MILLIGRAM(S): at 22:06

## 2021-11-10 RX ADMIN — AMLODIPINE BESYLATE 10 MILLIGRAM(S): 2.5 TABLET ORAL at 11:50

## 2021-11-10 RX ADMIN — ENOXAPARIN SODIUM 80 MILLIGRAM(S): 100 INJECTION SUBCUTANEOUS at 15:59

## 2021-11-10 RX ADMIN — CHLORHEXIDINE GLUCONATE 1 APPLICATION(S): 213 SOLUTION TOPICAL at 06:07

## 2021-11-10 NOTE — PROGRESS NOTE ADULT - SUBJECTIVE AND OBJECTIVE BOX
OVERNIGHT EVENTS:    SUBJECTIVE / INTERVAL HPI: Patient seen and examined at bedside.     VITAL SIGNS:  Vital Signs Last 24 Hrs  T(C): 37.4 (10 Nov 2021 06:02), Max: 37.8 (09 Nov 2021 18:00)  T(F): 99.3 (10 Nov 2021 06:02), Max: 100.1 (09 Nov 2021 18:00)  HR: 80 (10 Nov 2021 07:00) (80 - 103)  BP: 110/75 (10 Nov 2021 07:00) (110/65 - 142/77)  BP(mean): 88 (10 Nov 2021 07:00) (81 - 107)  RR: 19 (10 Nov 2021 07:00) (12 - 28)  SpO2: 92% (10 Nov 2021 07:00) (82% - 98%)    PHYSICAL EXAM:    General: WDWN  HEENT: NC/AT; PERRL, anicteric sclera; MMM  Neck: supple  Cardiovascular: +S1/S2, RRR  Respiratory: CTA B/L; no W/R/R  Gastrointestinal: soft, NT/ND; +BSx4  Extremities: WWP; no edema, clubbing or cyanosis  Vascular: 2+ radial, DP/PT pulses B/L  Neurological: AAOx3; no focal deficits    MEDICATIONS:  MEDICATIONS  (STANDING):  amLODIPine   Tablet 10 milliGRAM(s) Oral every 24 hours  chlorhexidine 2% Cloths 1 Application(s) Topical <User Schedule>  dexAMETHasone  IVPB 10 milliGRAM(s) IV Intermittent every 24 hours  enoxaparin Injectable 40 milliGRAM(s) SubCutaneous every 24 hours  insulin lispro (ADMELOG) corrective regimen sliding scale   SubCutaneous Before meals and at bedtime  losartan 50 milliGRAM(s) Oral daily  mirtazapine 30 milliGRAM(s) Oral at bedtime  pantoprazole  Injectable 40 milliGRAM(s) IV Push every 24 hours  tamsulosin 0.4 milliGRAM(s) Oral at bedtime  trimethoprim  160 mG/sulfamethoxazole 800 mG 1 Tablet(s) Oral every 12 hours    MEDICATIONS  (PRN):  acetaminophen     Tablet .. 650 milliGRAM(s) Oral every 6 hours PRN Temp greater or equal to 38C (100.4F), Mild Pain (1 - 3)      ALLERGIES:  Allergies    Cipro (Rash)    Intolerances    Cipro (Other)      LABS:                        14.7   13.46 )-----------( 293      ( 10 Nov 2021 06:32 )             43.1     11-10    141  |  111<H>  |  28<H>  ----------------------------<  121<H>  4.6   |  20<L>  |  0.90    Ca    9.5      10 Nov 2021 06:32  Phos  3.5     11-10  Mg     2.7     11-10    TPro  6.3  /  Alb  2.7<L>  /  TBili  0.6  /  DBili  x   /  AST  38  /  ALT  36  /  AlkPhos  72  11-10        CAPILLARY BLOOD GLUCOSE      POCT Blood Glucose.: 133 mg/dL (09 Nov 2021 22:05)      RADIOLOGY & ADDITIONAL TESTS: Reviewed. OVERNIGHT EVENTS: desaturated to 83%, patient then agreed to CPAP and improved to 94%    SUBJECTIVE / INTERVAL HPI: Patient seen and examined at bedside. He was resting comfortably and did not appear in acute distress. He was very somnolent and did not participate in further discussion.    VITAL SIGNS:  Vital Signs Last 24 Hrs  T(C): 37.4 (10 Nov 2021 06:02), Max: 37.8 (09 Nov 2021 18:00)  T(F): 99.3 (10 Nov 2021 06:02), Max: 100.1 (09 Nov 2021 18:00)  HR: 80 (10 Nov 2021 07:00) (80 - 103)  BP: 110/75 (10 Nov 2021 07:00) (110/65 - 142/77)  BP(mean): 88 (10 Nov 2021 07:00) (81 - 107)  RR: 19 (10 Nov 2021 07:00) (12 - 28)  SpO2: 92% (10 Nov 2021 07:00) (82% - 98%)    PHYSICAL EXAM:  GENERAL: very somnolent, arousable  HEENT: Normocephalic;  conjunctivae and sclerae clear; dry mucous membranes   NECK : supple  CHEST/LUNG: minimal crackles and rhonchi b/l   HEART: S1 S2  regular; no murmurs, gallops or rubs  ABDOMEN: Soft, Nontender, Nondistended; Bowel sounds present  EXTREMITIES: no cyanosis; no calf tenderness, trace LE edema  SKIN: warm and dry; + abrasion to left shoulder   NERVOUS SYSTEM: no focal neurological deficit. CN II-XII grossly intact,     MEDICATIONS:  MEDICATIONS  (STANDING):  amLODIPine   Tablet 10 milliGRAM(s) Oral every 24 hours  chlorhexidine 2% Cloths 1 Application(s) Topical <User Schedule>  dexAMETHasone  IVPB 10 milliGRAM(s) IV Intermittent every 24 hours  enoxaparin Injectable 40 milliGRAM(s) SubCutaneous every 24 hours  insulin lispro (ADMELOG) corrective regimen sliding scale   SubCutaneous Before meals and at bedtime  losartan 50 milliGRAM(s) Oral daily  mirtazapine 30 milliGRAM(s) Oral at bedtime  pantoprazole  Injectable 40 milliGRAM(s) IV Push every 24 hours  tamsulosin 0.4 milliGRAM(s) Oral at bedtime  trimethoprim  160 mG/sulfamethoxazole 800 mG 1 Tablet(s) Oral every 12 hours    MEDICATIONS  (PRN):  acetaminophen     Tablet .. 650 milliGRAM(s) Oral every 6 hours PRN Temp greater or equal to 38C (100.4F), Mild Pain (1 - 3)      ALLERGIES:  Allergies    Cipro (Rash)    Intolerances    Cipro (Other)      LABS:                        14.7   13.46 )-----------( 293      ( 10 Nov 2021 06:32 )             43.1     11-10    141  |  111<H>  |  28<H>  ----------------------------<  121<H>  4.6   |  20<L>  |  0.90    Ca    9.5      10 Nov 2021 06:32  Phos  3.5     11-10  Mg     2.7     11-10    TPro  6.3  /  Alb  2.7<L>  /  TBili  0.6  /  DBili  x   /  AST  38  /  ALT  36  /  AlkPhos  72  11-10        CAPILLARY BLOOD GLUCOSE      POCT Blood Glucose.: 133 mg/dL (09 Nov 2021 22:05)      RADIOLOGY & ADDITIONAL TESTS: Reviewed.

## 2021-11-10 NOTE — PROGRESS NOTE ADULT - PROBLEM SELECTOR PLAN 8
- A1C 5.7%    To do:  - ISS as per protocol given steroid induced hyperglycemia  - monitor FSG   - stress lifestyle modifications

## 2021-11-10 NOTE — PROGRESS NOTE ADULT - PROBLEM SELECTOR PLAN 1
- 2/2 COVID  vs psych vs language barrier  - UTI likely also contributing to patient's AMS  - met SIRS criteria 2/4  on admission  - CT head negative for acute findings   - Pt on diazepam 10 mg and Narco 7.5-325mg at home -- on hold due to somnolence  - suspect fevers are multifactorial i/s/o COVID-19 and UTI  - s/p HFNC with CPAP QHS and remdesevir     To do:  - cw decadron 10 mg IV  - cw HFNC with a goal SpO2 of 88-90%

## 2021-11-10 NOTE — PROGRESS NOTE ADULT - PROBLEM SELECTOR PLAN 3
- likely due to increased metabolic demands i/s/o COVID infection  - echo with EF 55%, mild tricuspid regurg   - DVT study negative  - TSH normal    To do:  - f/u CTPE for further mngmt   - continuous telemetry monitoring

## 2021-11-10 NOTE — PROGRESS NOTE ADULT - PROBLEM SELECTOR PLAN 2
- COVID-19 Pneumonia: CXR with bilateral hazy opacities c/w viral COVID PNA  - not a candidate for Tocilizumab, no indication for antibiotics at this time, procalcitonin negative  - s/p Remdesivir    - Pt desaturated on NC overnight and was placed in BiPAP likely 2/2 PE vs PNA vs fibrinous plugging of large airway in the setting of COVID  - D dimer uptrending from 2655 to 3549  - CXR on 11/10 does not show any interval change     To do:  - cw HFNC with a goal SpO2 of 88-90%   - continue Dexamethasone 10 mg   - f/u CTPE for further mngmt   - Lovenox for VTE prophylaxis  - patient may prone as tolerated  - supportive care, monitor WBC, fever curve, cultures NGTD

## 2021-11-10 NOTE — PROGRESS NOTE ADULT - ASSESSMENT
71 year old male with PMx of HTN, depression, chronic back pain who was transferred from [facility], where he was admitted on 11/2 after an unwitnessed fall (down for >1 day) and found to have acute hypoxic respiratory failure secondary to COVID-19, altered mental status and rhabdomyolysis. Due to increased work of breathing and oxygen requirements patient was upgraded to MICU for closer monitoring. Patient on HFNC and CPAP overnight, WOB has improved. Urine cultures positive for citrobacter koseri for which he is on Bactrim DS BID.

## 2021-11-10 NOTE — PROGRESS NOTE ADULT - PROBLEM SELECTOR PLAN 6
- BPH vs. benzodiazapine induced   - Citrobacter koseri UTI    To do:  - continue with Bactrim DS BID  - TOV today   - monitor urine output post TOV

## 2021-11-10 NOTE — PROGRESS NOTE ADULT - ASSESSMENT
per MICU    71 year old male with PMx of HTN, depression, chronic back pain who was transferred from [facility], where he was admitted on 11/2 after an unwitnessed fall (down for >1 day) and found to have acute hypoxic respiratory failure secondary to COVID-19, altered mental status and rhabdomyolysis. Due to increased work of breathing and oxygen requirements patient was upgraded to MICU for closer monitoring. Patient on HFNC and CPAP overnight, WOB has improved. Urine cultures positive for citrobacter koseri for which he is on Bactrim DS BID.       Problem/Plan - 1:  ·  Problem: Encephalopathy due to COVID-19 virus.   ·  Plan: - 2/2 COVID  vs psych vs language barrier  - UTI likely also contributing to patient's AMS  - met SIRS criteria 2/4  on admission  - CT head negative for acute findings   - Pt on diazepam 10 mg and Narco 7.5-325mg at home -- on hold due to somnolence  - suspect fevers are multifactorial i/s/o COVID-19 and UTI  - s/p HFNC with CPAP QHS and remdesevir     To do:  - cw decadron 10 mg IV  - cw HFNC with a goal SpO2 of 88-90%.    Problem/Plan - 2:  ·  Problem: Acute respiratory failure with hypoxia.   ·  Plan: - COVID-19 Pneumonia: CXR with bilateral hazy opacities c/w viral COVID PNA  - not a candidate for Tocilizumab, no indication for antibiotics at this time, procalcitonin negative  - s/p Remdesivir    - Pt desaturated on NC overnight and was placed in BiPAP likely 2/2 PE vs PNA vs fibrinous plugging of large airway in the setting of COVID  - D dimer uptrending from 2655 to 3549  - CXR on 11/10 does not show any interval change     To do:  - cw HFNC with a goal SpO2 of 88-90%   - continue Dexamethasone 10 mg   - f/u CTPE for further mngmt   - Lovenox for VTE prophylaxis  - patient may prone as tolerated  - supportive care, monitor WBC, fever curve, cultures NGTD.    Problem/Plan - 3:  ·  Problem: Tachycardia.   ·  Plan: - likely due to increased metabolic demands i/s/o COVID infection  - echo with EF 55%, mild tricuspid regurg   - DVT study negative  - TSH normal    To do:  - f/u CTPE for further mngmt   - continuous telemetry monitoring.    Problem/Plan - 4:  ·  Problem: Hypertension.   ·  Plan: On home amlodipine and losartan     To do:  -cw amlodipine 10mg  -cw losartan 50 mg.    Problem/Plan - 5:  ·  Problem: Abnormal transaminases.   ·  Plan: RESOLVED.    Problem/Plan - 6:  ·  Problem: Urine retention.   ·  Plan: - BPH vs. benzodiazapine induced   - Citrobacter koseri UTI    To do:  - continue with Bactrim DS BID  - TOV today   - monitor urine output post TOV.    Problem/Plan - 7:  ·  Problem: Hypernatremia.   ·  Plan: RESOLVED.    Problem/Plan - 8:  ·  Problem: Prediabetes.   ·  Plan: - A1C 5.7%    To do:  - ISS as per protocol given steroid induced hyperglycemia  - monitor FSG   - stress lifestyle modifications.    Problem/Plan - 9:  ·  Problem: Chronic back pain.   ·  Plan: - would avoid adding home opioids at this time due to risk of oversedation.    Problem/Plan - 10:  ·  Problem: Nutrition, metabolism, and development symptoms.   ·  Plan; F: none  E: replete prn  N: DASH diet   A: lovenox 40mg daily   GI: Pantoprazole 40 mg daily   Dispo: MICU.

## 2021-11-10 NOTE — PROGRESS NOTE ADULT - PROBLEM SELECTOR PLAN 10
F: none  E: replete prn  N: DASH diet   A: lovenox 40mg daily   GI: Pantoprazole 40 mg daily   Dispo: MICU

## 2021-11-10 NOTE — PROGRESS NOTE ADULT - SUBJECTIVE AND OBJECTIVE BOX
Physical Medicine and Rehabilitation Progress Note:    Patient is a 71y old  Male who presents with a chief complaint of Acute hypoxic respiratory failure 2/2 covid pna (10 Nov 2021 07:37)      HPI:  71 year old male with PMx of HTN, depression, chronic back pain, transferred from [facility], where he was admitted on 11/2 after an unwitnessed fall (down for >1 day) and found to have acute hypoxic respiratory failure secondary to COVID-19 pneumonia and altered mental status. Pt is currently a poor historian, but per family, his mental status has been worsening in the past few days and he had an unwitnessed fall coming from the bathroom where he fell and remained on the floor for >1 day. He lives at home with his wife and normally ambulates independently. He is not vaccinated for covid.    Patient complained of overall weakness, but denied chest pain, SOB, fevers, diarrhea/constipation, LE pain/swelling. Virgen in place.    ED Course (at [facility]):  Vitals: /91, HR 76/min, RR 16/min, 93% on RA -> 95% on 5 L NC  EKG Right bundle branch block  Abnormal Labs: Covid+. Plt 127, D-dimers 988, Trop-I 247 then 244 after 6 hrs, K 3.0, Mg 2.8, BUN 38, Alb 2.6, , ALT 96, [place] [address] then 9896 after 6 hrs, pH 7.51, pCO2 37, pO2 72, HCO3 30, Utox positive for BZD (home med). UA dirty.  Imaging: CXR: infiltration in mid/lower left lung field and milder amount in right lung field. Consistent with covid pna. CTH: No hydrocephalus, acute intracranial hemorrhage, mass effect, or brain edema.  Interventions: 300 ASA rectal, Lovenox 80mg x1, 2 L NS then 150 ml/hr for 6 hrs, Decadron 6mg IV, Potassium 10mg IV x3.      **Note on home meds: As per pt's daughter Manuela, pt takes Hydrocodone, Diazepam, Mirtazapine at home due to "back surgeries/back problems and depression." She does not know the full list or the doses. Pt also on unknown anti-hypertensives. Will call pharmacy in AM.   (03 Nov 2021 03:08)                            14.7   13.46 )-----------( 293      ( 10 Nov 2021 06:32 )             43.1       11-10    141  |  111<H>  |  28<H>  ----------------------------<  121<H>  4.6   |  20<L>  |  0.90    Ca    9.5      10 Nov 2021 06:32  Phos  3.5     11-10  Mg     2.7     11-10    TPro  6.3  /  Alb  2.7<L>  /  TBili  0.6  /  DBili  x   /  AST  38  /  ALT  36  /  AlkPhos  72  11-10    Vital Signs Last 24 Hrs  T(C): 38.1 (10 Nov 2021 14:35), Max: 38.1 (10 Nov 2021 14:35)  T(F): 100.6 (10 Nov 2021 14:35), Max: 100.6 (10 Nov 2021 14:35)  HR: 104 (10 Nov 2021 14:00) (77 - 104)  BP: 110/79 (10 Nov 2021 14:00) (104/65 - 132/80)  BP(mean): 86 (10 Nov 2021 14:00) (75 - 100)  RR: 26 (10 Nov 2021 14:00) (19 - 28)  SpO2: 88% (10 Nov 2021 14:00) (82% - 98%)    MEDICATIONS  (STANDING):  amLODIPine   Tablet 10 milliGRAM(s) Oral every 24 hours  chlorhexidine 2% Cloths 1 Application(s) Topical <User Schedule>  dexAMETHasone  IVPB 10 milliGRAM(s) IV Intermittent every 24 hours  enoxaparin Injectable 40 milliGRAM(s) SubCutaneous every 24 hours  insulin lispro (ADMELOG) corrective regimen sliding scale   SubCutaneous Before meals and at bedtime  losartan 50 milliGRAM(s) Oral daily  mirtazapine 30 milliGRAM(s) Oral at bedtime  pantoprazole  Injectable 40 milliGRAM(s) IV Push every 24 hours  tamsulosin 0.4 milliGRAM(s) Oral at bedtime  trimethoprim  160 mG/sulfamethoxazole 800 mG 1 Tablet(s) Oral every 12 hours    MEDICATIONS  (PRN):  acetaminophen     Tablet .. 650 milliGRAM(s) Oral every 6 hours PRN Temp greater or equal to 38C (100.4F), Mild Pain (1 - 3)    Currently Undergoing Physical/ Occupational Therapy at bedside.    Functional Status Assessment:   11/9/2021        Cognitive/Perceptual/Neuro  Level of Consciousness: somnolent  Arousal Level: arouses to voice  Orientation: disoriented to;  time;  situation  Speech: hypophonic;  hoarse  Mood/Behavior: cooperative    Language Assistance  Preferred Language to Address Healthcare Preferred Language to Address Healthcare: English    Therapeutic Interventions      Bed Mobility  Bed Mobility Training Rehab Potential: good, to achieve stated therapy goals  Bed Mobility Training Rolling/Turning: maximum assist (25% patient effort);  2 person assist;  nonverbal cues (demo/gestures);  verbal cues;  bed rails  Bed Mobility Training Scooting: maximum assist (25% patient effort);  2 person assist;  verbal cues  Bed Mobility Training Sit-to-Supine: maximum assist (25% patient effort);  2 person assist  Bed Mobility Training Supine-to-Sit: maximum assist (25% patient effort);  2 person assist;  verbal cues  Bed Mobility Training Limitations: decreased strength;  impaired balance;  decreased ability to use legs for bridging/pushing;  decreased ability to use arms for pushing/pulling;  cognitive, decreased safety awareness    Sit-Stand Transfer Training  Sit-to-Stand Transfer Training Rehab Potential: good, to achieve stated therapy goals  Transfer Training Stand-to-Sit Transfer: maximum assist (25% patient effort);  2 person assist;  verbal cues;  nonverbal cues (demo/gestures);  weight-bearing as tolerated   bilat UE support  Sit-to-Stand Transfer Training Transfer Safety Analysis: impaired balance;  decreased strength;  incomplete knee extension bilat     Therapeutic Exercise  Therapeutic Exercise Rehab Effort: good  Therapeutic Exercise Detail: Pt. dangled > 10 min with fluctuating sitting balance requiring MoDA progressing to Queenie for sitting balance at edge of bed. Pt. practiced LAQ x10 bilat, trunk flexion/ext with eliseo-posterior weight shifting x5.           PM&R Impression: as above    Current Disposition Plan Recommendations:    subacute rehab placement

## 2021-11-11 LAB
ALBUMIN SERPL ELPH-MCNC: 2.9 G/DL — LOW (ref 3.3–5)
ALP SERPL-CCNC: 80 U/L — SIGNIFICANT CHANGE UP (ref 40–120)
ALT FLD-CCNC: 38 U/L — SIGNIFICANT CHANGE UP (ref 10–45)
ANION GAP SERPL CALC-SCNC: 10 MMOL/L — SIGNIFICANT CHANGE UP (ref 5–17)
AST SERPL-CCNC: 38 U/L — SIGNIFICANT CHANGE UP (ref 10–40)
BASOPHILS # BLD AUTO: 0.05 K/UL — SIGNIFICANT CHANGE UP (ref 0–0.2)
BASOPHILS NFR BLD AUTO: 0.3 % — SIGNIFICANT CHANGE UP (ref 0–2)
BILIRUB SERPL-MCNC: 0.6 MG/DL — SIGNIFICANT CHANGE UP (ref 0.2–1.2)
BUN SERPL-MCNC: 37 MG/DL — HIGH (ref 7–23)
CALCIUM SERPL-MCNC: 10.1 MG/DL — SIGNIFICANT CHANGE UP (ref 8.4–10.5)
CHLORIDE SERPL-SCNC: 111 MMOL/L — HIGH (ref 96–108)
CO2 SERPL-SCNC: 21 MMOL/L — LOW (ref 22–31)
CREAT SERPL-MCNC: 0.93 MG/DL — SIGNIFICANT CHANGE UP (ref 0.5–1.3)
CULTURE RESULTS: SIGNIFICANT CHANGE UP
CULTURE RESULTS: SIGNIFICANT CHANGE UP
D DIMER BLD IA.RAPID-MCNC: 2844 NG/ML DDU — HIGH
EOSINOPHIL # BLD AUTO: 0 K/UL — SIGNIFICANT CHANGE UP (ref 0–0.5)
EOSINOPHIL NFR BLD AUTO: 0 % — SIGNIFICANT CHANGE UP (ref 0–6)
GLUCOSE BLDC GLUCOMTR-MCNC: 102 MG/DL — HIGH (ref 70–99)
GLUCOSE BLDC GLUCOMTR-MCNC: 124 MG/DL — HIGH (ref 70–99)
GLUCOSE BLDC GLUCOMTR-MCNC: 154 MG/DL — HIGH (ref 70–99)
GLUCOSE BLDC GLUCOMTR-MCNC: 210 MG/DL — HIGH (ref 70–99)
GLUCOSE SERPL-MCNC: 135 MG/DL — HIGH (ref 70–99)
HCT VFR BLD CALC: 50.4 % — HIGH (ref 39–50)
HGB BLD-MCNC: 16.9 G/DL — SIGNIFICANT CHANGE UP (ref 13–17)
IMM GRANULOCYTES NFR BLD AUTO: 2.9 % — HIGH (ref 0–1.5)
LYMPHOCYTES # BLD AUTO: 0.9 K/UL — LOW (ref 1–3.3)
LYMPHOCYTES # BLD AUTO: 6.2 % — LOW (ref 13–44)
MAGNESIUM SERPL-MCNC: 2.7 MG/DL — HIGH (ref 1.6–2.6)
MCHC RBC-ENTMCNC: 30.4 PG — SIGNIFICANT CHANGE UP (ref 27–34)
MCHC RBC-ENTMCNC: 33.5 GM/DL — SIGNIFICANT CHANGE UP (ref 32–36)
MCV RBC AUTO: 90.6 FL — SIGNIFICANT CHANGE UP (ref 80–100)
MONOCYTES # BLD AUTO: 0.59 K/UL — SIGNIFICANT CHANGE UP (ref 0–0.9)
MONOCYTES NFR BLD AUTO: 4 % — SIGNIFICANT CHANGE UP (ref 2–14)
NEUTROPHILS # BLD AUTO: 12.64 K/UL — HIGH (ref 1.8–7.4)
NEUTROPHILS NFR BLD AUTO: 86.6 % — HIGH (ref 43–77)
NRBC # BLD: 0 /100 WBCS — SIGNIFICANT CHANGE UP (ref 0–0)
PHOSPHATE SERPL-MCNC: 3.9 MG/DL — SIGNIFICANT CHANGE UP (ref 2.5–4.5)
PLATELET # BLD AUTO: 309 K/UL — SIGNIFICANT CHANGE UP (ref 150–400)
POTASSIUM SERPL-MCNC: 4.9 MMOL/L — SIGNIFICANT CHANGE UP (ref 3.5–5.3)
POTASSIUM SERPL-SCNC: 4.9 MMOL/L — SIGNIFICANT CHANGE UP (ref 3.5–5.3)
PROT SERPL-MCNC: 6.9 G/DL — SIGNIFICANT CHANGE UP (ref 6–8.3)
RBC # BLD: 5.56 M/UL — SIGNIFICANT CHANGE UP (ref 4.2–5.8)
RBC # FLD: 14.5 % — SIGNIFICANT CHANGE UP (ref 10.3–14.5)
SODIUM SERPL-SCNC: 142 MMOL/L — SIGNIFICANT CHANGE UP (ref 135–145)
SPECIMEN SOURCE: SIGNIFICANT CHANGE UP
SPECIMEN SOURCE: SIGNIFICANT CHANGE UP
WBC # BLD: 14.61 K/UL — HIGH (ref 3.8–10.5)
WBC # FLD AUTO: 14.61 K/UL — HIGH (ref 3.8–10.5)

## 2021-11-11 PROCEDURE — 99233 SBSQ HOSP IP/OBS HIGH 50: CPT | Mod: GC

## 2021-11-11 RX ORDER — PANTOPRAZOLE SODIUM 20 MG/1
40 TABLET, DELAYED RELEASE ORAL
Refills: 0 | Status: DISCONTINUED | OUTPATIENT
Start: 2021-11-11 | End: 2021-11-18

## 2021-11-11 RX ADMIN — TAMSULOSIN HYDROCHLORIDE 0.4 MILLIGRAM(S): 0.4 CAPSULE ORAL at 21:12

## 2021-11-11 RX ADMIN — Medication 40 MILLIGRAM(S): at 15:31

## 2021-11-11 RX ADMIN — Medication 1 TABLET(S): at 21:12

## 2021-11-11 RX ADMIN — CHLORHEXIDINE GLUCONATE 1 APPLICATION(S): 213 SOLUTION TOPICAL at 07:07

## 2021-11-11 RX ADMIN — Medication 1: at 16:53

## 2021-11-11 RX ADMIN — Medication 2: at 22:31

## 2021-11-11 RX ADMIN — MIRTAZAPINE 30 MILLIGRAM(S): 45 TABLET, ORALLY DISINTEGRATING ORAL at 21:12

## 2021-11-11 RX ADMIN — ENOXAPARIN SODIUM 80 MILLIGRAM(S): 100 INJECTION SUBCUTANEOUS at 23:16

## 2021-11-11 RX ADMIN — Medication 650 MILLIGRAM(S): at 13:27

## 2021-11-11 RX ADMIN — Medication 1 TABLET(S): at 11:10

## 2021-11-11 RX ADMIN — LOSARTAN POTASSIUM 50 MILLIGRAM(S): 100 TABLET, FILM COATED ORAL at 11:10

## 2021-11-11 RX ADMIN — Medication 650 MILLIGRAM(S): at 00:13

## 2021-11-11 RX ADMIN — Medication 40 MILLIGRAM(S): at 23:15

## 2021-11-11 RX ADMIN — ENOXAPARIN SODIUM 80 MILLIGRAM(S): 100 INJECTION SUBCUTANEOUS at 15:31

## 2021-11-11 RX ADMIN — Medication 650 MILLIGRAM(S): at 14:15

## 2021-11-11 RX ADMIN — ENOXAPARIN SODIUM 80 MILLIGRAM(S): 100 INJECTION SUBCUTANEOUS at 02:20

## 2021-11-11 RX ADMIN — AMLODIPINE BESYLATE 10 MILLIGRAM(S): 2.5 TABLET ORAL at 07:07

## 2021-11-11 NOTE — PROGRESS NOTE ADULT - PROBLEM SELECTOR PLAN 10
F: none  E: replete prn  N: DASH diet   A: lovenox 80mg daily BID  GI: Pantoprazole 40 mg daily   Dispo: MICU

## 2021-11-11 NOTE — CHART NOTE - NSCHARTNOTEFT_GEN_A_CORE
BRIEF CL NOTE    Patient is a 71-year-old man, PPH depression, PMH HTN, back pain, transferred from [facility] after admitted on 11/2 after unwitnessed fall, found down for >1 day, found to be in acute hypoxic respiratory failure secondary to COVID-19, with rhabdomyolysis and AMS. Patient on HFNC and in ICU for further monitoring, with work of breathing improving. Patient also found to have urine cultures positive for citrobacter koseri    Psychiatry consulted for depression, rule out SI. Attempted to see patient tonight but patient was sleeping and did not engage. Will follow up tomorrow for full consult.    Of note, patient with consistent prescriptions for diazepam 10mg 90 tabs every 30 days (last 10/23/21) per [facility] (ref #[id number]).    PLAN:  --Maintain 1:1 overnight for safety  --Consider restarting diazepam 10mg PO tid for anxiety, hold for sedation  -- BRIEF CL NOTE    Patient is a 71-year-old man, PPH depression, PMH HTN, back pain, transferred from [place] after admitted on 11/2 after unwitnessed fall, found down for >1 day, found to be in acute hypoxic respiratory failure secondary to COVID-19, with rhabdomyolysis and AMS. Patient on HFNC and in ICU for further monitoring, with work of breathing improving. Patient also found to have urine cultures positive for citrobacter koseri    Psychiatry consulted for depression, rule out SI. Attempted to see patient tonight but patient was sleeping and did not engage. Will follow up tomorrow for full consult.    Of note, patient with consistent prescriptions for diazepam 10mg 90 tabs every 30 days (last 10/23/21) per [facility] (ref #[id number]).    PLAN:  --Maintain 1:1 overnight for safety  --Consider restarting diazepam 10mg PO tid for anxiety, hold for sedation  --Will follow

## 2021-11-11 NOTE — PROGRESS NOTE ADULT - PROBLEM SELECTOR PLAN 2
- COVID-19 Pneumonia: CXR with bilateral hazy opacities c/w viral COVID PNA  - not a candidate for Tocilizumab, no indication for antibiotics at this time, procalcitonin negative  - s/p Remdesivir    - Pt desaturated on NC overnight and was placed in BiPAP likely 2/2 PE vs PNA vs fibrinous plugging of large airway in the setting of COVID  - D dimer uptrending from 2655 to 3549 and then down to 2844 on 11/11  - CTPE on 11/10: Acute PE segmental branches right upper lobe posterior segment and right lower lobe posterior basal segment. Extensive bilateral groundglass lung opacities with scattered patchy consolidations.  - CXR on 11/11  Bilateral asymmetrical opacities/left greater than right, stable.    - s/p decadron 10 mg IV for 10 days    To do:  - cw HFNC with a goal SpO2 of 88-90%   - cw solumedrol 40 mg IV BID  - cw full anticoagulation with lovenox 80 mg BID IV - COVID-19 Pneumonia: CXR with bilateral hazy opacities c/w viral COVID PNA  - not a candidate for Tocilizumab, no indication for antibiotics at this time, procalcitonin negative  - s/p Remdesivir    - D dimer uptrending from 2655 to 3549 and then down to 2844 on 11/11  - CTPE on 11/10: Acute PE segmental branches right upper lobe posterior segment and right lower lobe posterior basal segment. Extensive bilateral groundglass lung opacities with scattered patchy consolidations.  - CXR on 11/11  Bilateral asymmetrical opacities/left greater than right, stable.   - s/p decadron 10 mg IV for 10 days  - 2/2 PE and non IPF interstitial lung disease + hypersenstivity pneumonitis    To do:  - cw HFNC with a goal SpO2 of 88-90%   - cw solumedrol 40 mg IV BID  - cw full anticoagulation with lovenox 80 mg BID IV

## 2021-11-11 NOTE — PROGRESS NOTE ADULT - PROBLEM SELECTOR PLAN 1
- 2/2 COVID  vs psych vs language barrier  - UTI likely also contributing to patient's AMS  - met SIRS criteria 2/4  on admission  - CT head negative for acute findings   - Pt on diazepam 10 mg and Narco 7.5-325mg at home -- on hold due to somnolence  - suspect fevers are multifactorial i/s/o COVID-19 and UTI  - s/p HFNC with CPAP QHS and remdesevir   - s/p decadron 10 mg IV for 10 days      To do:  - cw solumedrol 40 mg IV BID  - cw HFNC with a goal SpO2 of 88-90% - 2/2 COVID  vs psych vs language barrier  - UTI likely also contributing to patient's AMS  - met SIRS criteria 2/4  on admission  - Pt on diazepam 10 mg and Narco 7.5-325mg at home -- on hold due to somnolence  - s/p HFNC with CPAP QHS and remdesevir   - s/p decadron 10 mg IV for 10 days  - pt has a h/o depression and psych was consulted to rule out underlying psych component.    To do:  - f/u psych recs        To do:  - cw solumedrol 40 mg IV BID  - cw HFNC with a goal SpO2 of 88-90% - 2/2 COVID  vs psych vs language barrier  - UTI likely also contributing to patient's AMS  - met SIRS criteria 2/4  on admission  - Pt on diazepam 10 mg and Narco 7.5-325mg at home -- on hold due to somnolence  - s/p HFNC with CPAP QHS and remdesevir   - s/p decadron 10 mg IV for 10 days  - pt has a h/o depression and psych was consulted to rule out underlying psych component.    To do:  - f/u psych recs  - cw solumedrol 40 mg IV BID  - cw HFNC with a goal SpO2 of 88-90%

## 2021-11-11 NOTE — PROGRESS NOTE ADULT - ASSESSMENT
71 year old male with PMx of HTN, depression, chronic back pain who was transferred from [facility], where he was admitted on 11/2 after an unwitnessed fall (down for >1 day) and found to have acute hypoxic respiratory failure secondary to COVID-19, altered mental status and rhabdomyolysis. Due to increased work of breathing and oxygen requirements patient was upgraded to MICU for closer monitoring. Patient on HFNC and CPAP overnight, WOB has improved. Urine cultures positive for citrobacter koseri for which he is on Bactrim DS BID.

## 2021-11-11 NOTE — PROGRESS NOTE ADULT - SUBJECTIVE AND OBJECTIVE BOX
OVERNIGHT EVENTS:    SUBJECTIVE / INTERVAL HPI: Patient seen and examined at bedside.     VITAL SIGNS:  Vital Signs Last 24 Hrs  T(C): 37.5 (11 Nov 2021 09:34), Max: 38.2 (10 Nov 2021 18:07)  T(F): 99.5 (11 Nov 2021 09:34), Max: 100.8 (10 Nov 2021 18:07)  HR: 103 (11 Nov 2021 11:00) (79 - 104)  BP: 111/70 (11 Nov 2021 11:00) (104/63 - 126/69)  BP(mean): 85 (11 Nov 2021 11:00) (75 - 95)  RR: 24 (11 Nov 2021 11:00) (18 - 26)  SpO2: 88% (11 Nov 2021 11:00) (84% - 97%)    PHYSICAL EXAM:    General: WDWN  HEENT: NC/AT; PERRL, anicteric sclera; MMM  Neck: supple  Cardiovascular: +S1/S2, RRR  Respiratory: CTA B/L; no W/R/R  Gastrointestinal: soft, NT/ND; +BSx4  Extremities: WWP; no edema, clubbing or cyanosis  Vascular: 2+ radial, DP/PT pulses B/L  Neurological: AAOx3; no focal deficits    MEDICATIONS:  MEDICATIONS  (STANDING):  amLODIPine   Tablet 10 milliGRAM(s) Oral every 24 hours  chlorhexidine 2% Cloths 1 Application(s) Topical <User Schedule>  enoxaparin Injectable 80 milliGRAM(s) SubCutaneous every 12 hours  insulin lispro (ADMELOG) corrective regimen sliding scale   SubCutaneous Before meals and at bedtime  losartan 50 milliGRAM(s) Oral daily  methylPREDNISolone sodium succinate Injectable 40 milliGRAM(s) IV Push every 12 hours  mirtazapine 30 milliGRAM(s) Oral at bedtime  pantoprazole    Tablet 40 milliGRAM(s) Oral before breakfast  tamsulosin 0.4 milliGRAM(s) Oral at bedtime  trimethoprim  160 mG/sulfamethoxazole 800 mG 1 Tablet(s) Oral every 12 hours    MEDICATIONS  (PRN):  acetaminophen     Tablet .. 650 milliGRAM(s) Oral every 6 hours PRN Temp greater or equal to 38C (100.4F), Mild Pain (1 - 3)      ALLERGIES:  Allergies    Cipro (Rash)    Intolerances    Cipro (Other)      LABS:                        16.9   14.61 )-----------( 309      ( 11 Nov 2021 06:48 )             50.4     11-11    142  |  111<H>  |  37<H>  ----------------------------<  135<H>  4.9   |  21<L>  |  0.93    Ca    10.1      11 Nov 2021 06:48  Phos  3.9     11-11  Mg     2.7     11-11    TPro  6.9  /  Alb  2.9<L>  /  TBili  0.6  /  DBili  x   /  AST  38  /  ALT  38  /  AlkPhos  80  11-11        CAPILLARY BLOOD GLUCOSE      POCT Blood Glucose.: 102 mg/dL (11 Nov 2021 11:16)      RADIOLOGY & ADDITIONAL TESTS: Reviewed. OVERNIGHT EVENTS: patient on HFNC saturating well    SUBJECTIVE / INTERVAL HPI: Patient seen and examined at bedside. He was resting comfortably and did not appear in acute distress. He mentioned pain around his rt lower chest and some supra pubic pain. He denied any headache, SOB, chills, nausea, vomiting numbness or weakness     VITAL SIGNS:  Vital Signs Last 24 Hrs  T(C): 37.5 (11 Nov 2021 09:34), Max: 38.2 (10 Nov 2021 18:07)  T(F): 99.5 (11 Nov 2021 09:34), Max: 100.8 (10 Nov 2021 18:07)  HR: 103 (11 Nov 2021 11:00) (79 - 104)  BP: 111/70 (11 Nov 2021 11:00) (104/63 - 126/69)  BP(mean): 85 (11 Nov 2021 11:00) (75 - 95)  RR: 24 (11 Nov 2021 11:00) (18 - 26)  SpO2: 88% (11 Nov 2021 11:00) (84% - 97%)    PHYSICAL EXAM:    GENERAL: resting comfortably and did not appear in acute distess  HEENT: Normocephalic;  conjunctivae and sclerae clear; dry mucous membranes   NECK : supple  CHEST/LUNG: crackles and rhonchi b/l   HEART: S1 S2  regular; no murmurs, gallops or rubs  ABDOMEN: Soft, Nontender, Nondistended; Bowel sounds present  EXTREMITIES: no cyanosis; no calf tenderness, trace LE edema  SKIN: warm and dry  NERVOUS SYSTEM: no focal neurological deficit. CN II-XII grossly intact,     MEDICATIONS:  MEDICATIONS  (STANDING):  amLODIPine   Tablet 10 milliGRAM(s) Oral every 24 hours  chlorhexidine 2% Cloths 1 Application(s) Topical <User Schedule>  enoxaparin Injectable 80 milliGRAM(s) SubCutaneous every 12 hours  insulin lispro (ADMELOG) corrective regimen sliding scale   SubCutaneous Before meals and at bedtime  losartan 50 milliGRAM(s) Oral daily  methylPREDNISolone sodium succinate Injectable 40 milliGRAM(s) IV Push every 12 hours  mirtazapine 30 milliGRAM(s) Oral at bedtime  pantoprazole    Tablet 40 milliGRAM(s) Oral before breakfast  tamsulosin 0.4 milliGRAM(s) Oral at bedtime  trimethoprim  160 mG/sulfamethoxazole 800 mG 1 Tablet(s) Oral every 12 hours    MEDICATIONS  (PRN):  acetaminophen     Tablet .. 650 milliGRAM(s) Oral every 6 hours PRN Temp greater or equal to 38C (100.4F), Mild Pain (1 - 3)      ALLERGIES:  Allergies    Cipro (Rash)    Intolerances    Cipro (Other)      LABS:                        16.9   14.61 )-----------( 309      ( 11 Nov 2021 06:48 )             50.4     11-11    142  |  111<H>  |  37<H>  ----------------------------<  135<H>  4.9   |  21<L>  |  0.93    Ca    10.1      11 Nov 2021 06:48  Phos  3.9     11-11  Mg     2.7     11-11    TPro  6.9  /  Alb  2.9<L>  /  TBili  0.6  /  DBili  x   /  AST  38  /  ALT  38  /  AlkPhos  80  11-11        CAPILLARY BLOOD GLUCOSE      POCT Blood Glucose.: 102 mg/dL (11 Nov 2021 11:16)      RADIOLOGY & ADDITIONAL TESTS: Reviewed.

## 2021-11-11 NOTE — PROGRESS NOTE ADULT - PROBLEM SELECTOR PLAN 3
- likely due to increased metabolic demands i/s/o COVID infection  - echo with EF 55%, mild tricuspid regurg   - DVT study negative  - TSH normal    To do:  - continuous telemetry monitoring  -  full anticoagulation with lovenox 80 mg BID IV

## 2021-11-12 DIAGNOSIS — F13.20 SEDATIVE, HYPNOTIC OR ANXIOLYTIC DEPENDENCE, UNCOMPLICATED: ICD-10-CM

## 2021-11-12 LAB
ANION GAP SERPL CALC-SCNC: 10 MMOL/L — SIGNIFICANT CHANGE UP (ref 5–17)
BASOPHILS # BLD AUTO: 0.05 K/UL — SIGNIFICANT CHANGE UP (ref 0–0.2)
BASOPHILS NFR BLD AUTO: 0.2 % — SIGNIFICANT CHANGE UP (ref 0–2)
BUN SERPL-MCNC: 34 MG/DL — HIGH (ref 7–23)
CALCIUM SERPL-MCNC: 9.7 MG/DL — SIGNIFICANT CHANGE UP (ref 8.4–10.5)
CHLORIDE SERPL-SCNC: 108 MMOL/L — SIGNIFICANT CHANGE UP (ref 96–108)
CO2 SERPL-SCNC: 21 MMOL/L — LOW (ref 22–31)
CREAT SERPL-MCNC: 0.79 MG/DL — SIGNIFICANT CHANGE UP (ref 0.5–1.3)
EOSINOPHIL # BLD AUTO: 0 K/UL — SIGNIFICANT CHANGE UP (ref 0–0.5)
EOSINOPHIL NFR BLD AUTO: 0 % — SIGNIFICANT CHANGE UP (ref 0–6)
GLUCOSE BLDC GLUCOMTR-MCNC: 104 MG/DL — HIGH (ref 70–99)
GLUCOSE BLDC GLUCOMTR-MCNC: 114 MG/DL — HIGH (ref 70–99)
GLUCOSE BLDC GLUCOMTR-MCNC: 114 MG/DL — HIGH (ref 70–99)
GLUCOSE BLDC GLUCOMTR-MCNC: 119 MG/DL — HIGH (ref 70–99)
GLUCOSE SERPL-MCNC: 123 MG/DL — HIGH (ref 70–99)
HCT VFR BLD CALC: 48.7 % — SIGNIFICANT CHANGE UP (ref 39–50)
HGB BLD-MCNC: 16 G/DL — SIGNIFICANT CHANGE UP (ref 13–17)
IMM GRANULOCYTES NFR BLD AUTO: 1.7 % — HIGH (ref 0–1.5)
LYMPHOCYTES # BLD AUTO: 0.95 K/UL — LOW (ref 1–3.3)
LYMPHOCYTES # BLD AUTO: 4.5 % — LOW (ref 13–44)
MAGNESIUM SERPL-MCNC: 2.6 MG/DL — SIGNIFICANT CHANGE UP (ref 1.6–2.6)
MCHC RBC-ENTMCNC: 29.6 PG — SIGNIFICANT CHANGE UP (ref 27–34)
MCHC RBC-ENTMCNC: 32.9 GM/DL — SIGNIFICANT CHANGE UP (ref 32–36)
MCV RBC AUTO: 90 FL — SIGNIFICANT CHANGE UP (ref 80–100)
MONOCYTES # BLD AUTO: 0.69 K/UL — SIGNIFICANT CHANGE UP (ref 0–0.9)
MONOCYTES NFR BLD AUTO: 3.2 % — SIGNIFICANT CHANGE UP (ref 2–14)
NEUTROPHILS # BLD AUTO: 19.25 K/UL — HIGH (ref 1.8–7.4)
NEUTROPHILS NFR BLD AUTO: 90.4 % — HIGH (ref 43–77)
NRBC # BLD: 0 /100 WBCS — SIGNIFICANT CHANGE UP (ref 0–0)
PHOSPHATE SERPL-MCNC: 3.7 MG/DL — SIGNIFICANT CHANGE UP (ref 2.5–4.5)
PLATELET # BLD AUTO: 305 K/UL — SIGNIFICANT CHANGE UP (ref 150–400)
POTASSIUM SERPL-MCNC: SIGNIFICANT CHANGE UP (ref 3.5–5.3)
POTASSIUM SERPL-SCNC: SIGNIFICANT CHANGE UP (ref 3.5–5.3)
RBC # BLD: 5.41 M/UL — SIGNIFICANT CHANGE UP (ref 4.2–5.8)
RBC # FLD: 14.6 % — HIGH (ref 10.3–14.5)
SODIUM SERPL-SCNC: 139 MMOL/L — SIGNIFICANT CHANGE UP (ref 135–145)
WBC # BLD: 21.31 K/UL — HIGH (ref 3.8–10.5)
WBC # FLD AUTO: 21.31 K/UL — HIGH (ref 3.8–10.5)

## 2021-11-12 PROCEDURE — 99233 SBSQ HOSP IP/OBS HIGH 50: CPT | Mod: GC

## 2021-11-12 PROCEDURE — 71045 X-RAY EXAM CHEST 1 VIEW: CPT | Mod: 26

## 2021-11-12 PROCEDURE — 99223 1ST HOSP IP/OBS HIGH 75: CPT

## 2021-11-12 RX ORDER — SENNA PLUS 8.6 MG/1
2 TABLET ORAL AT BEDTIME
Refills: 0 | Status: DISCONTINUED | OUTPATIENT
Start: 2021-11-12 | End: 2021-11-18

## 2021-11-12 RX ORDER — POLYETHYLENE GLYCOL 3350 17 G/17G
17 POWDER, FOR SOLUTION ORAL EVERY 12 HOURS
Refills: 0 | Status: DISCONTINUED | OUTPATIENT
Start: 2021-11-12 | End: 2021-11-18

## 2021-11-12 RX ADMIN — PANTOPRAZOLE SODIUM 40 MILLIGRAM(S): 20 TABLET, DELAYED RELEASE ORAL at 06:46

## 2021-11-12 RX ADMIN — AMLODIPINE BESYLATE 10 MILLIGRAM(S): 2.5 TABLET ORAL at 06:46

## 2021-11-12 RX ADMIN — SENNA PLUS 2 TABLET(S): 8.6 TABLET ORAL at 21:32

## 2021-11-12 RX ADMIN — Medication 1 TABLET(S): at 10:19

## 2021-11-12 RX ADMIN — POLYETHYLENE GLYCOL 3350 17 GRAM(S): 17 POWDER, FOR SOLUTION ORAL at 09:40

## 2021-11-12 RX ADMIN — Medication 1 TABLET(S): at 21:32

## 2021-11-12 RX ADMIN — MIRTAZAPINE 30 MILLIGRAM(S): 45 TABLET, ORALLY DISINTEGRATING ORAL at 21:32

## 2021-11-12 RX ADMIN — ENOXAPARIN SODIUM 80 MILLIGRAM(S): 100 INJECTION SUBCUTANEOUS at 11:22

## 2021-11-12 RX ADMIN — POLYETHYLENE GLYCOL 3350 17 GRAM(S): 17 POWDER, FOR SOLUTION ORAL at 21:31

## 2021-11-12 RX ADMIN — Medication 40 MILLIGRAM(S): at 23:18

## 2021-11-12 RX ADMIN — CHLORHEXIDINE GLUCONATE 1 APPLICATION(S): 213 SOLUTION TOPICAL at 06:47

## 2021-11-12 RX ADMIN — ENOXAPARIN SODIUM 80 MILLIGRAM(S): 100 INJECTION SUBCUTANEOUS at 23:18

## 2021-11-12 RX ADMIN — Medication 40 MILLIGRAM(S): at 11:22

## 2021-11-12 RX ADMIN — TAMSULOSIN HYDROCHLORIDE 0.4 MILLIGRAM(S): 0.4 CAPSULE ORAL at 21:31

## 2021-11-12 NOTE — BH CONSULTATION LIAISON ASSESSMENT NOTE - HPI (INCLUDE ILLNESS QUALITY, SEVERITY, DURATION, TIMING, CONTEXT, MODIFYING FACTORS, ASSOCIATED SIGNS AND SYMPTOMS)
72yo M with a history of depression (on mirtazapine and diazepam as outpt), chronic back pain who was transferred from [facility], where he was admitted on 11/2 after an unwitnessed fall (down for >1 day) and found to have acute hypoxic respiratory failure secondary to COVID-19, now in the ICU. Admission has been complicated by delirium. Psychiatry consulted for assessment of depression and SI. Per primary team, pt has been voicing low mood, lack of desire to live, not participating in care, on 11/10 asked his RN to wrap IV tubing around his neck to end his life (though team feels physical ability to harm self is low given debility). No observed attempts at self-harm.    On interview this morning, pt found asleep but arousable to voice, calm, oriented to self, "[facility]", "September 20", unaware of reason for hospitalization. He reports to be feeling tired and sad.     D/w pt's RN today. Pt has been somnolent, confused, believed himself to be at home earlier this morning, asking for something from the attic. he has not voiced any suicidal ideation to her. No observed self-harm. 72yo M with a history of depression (on mirtazapine and diazepam as outpt), chronic back pain who was transferred from [facility], where he was admitted on 11/2 after an unwitnessed fall (down for >1 day) and found to have acute hypoxic respiratory failure secondary to COVID-19, now in the ICU. Admission has been complicated by delirium. Psychiatry consulted for assessment of depression and SI. Per primary team, pt has been voicing low mood, lack of desire to live, not participating in care, on 11/10 asked his RN to wrap IV tubing around his neck to end his life (though team feels physical ability to harm self is low given debility). No observed attempts at self-harm.    On interview this morning, pt found asleep but arousable to voice, calm, oriented to self, "[facility]", "September 20", unaware of reason for hospitalization. He declined use of . He was surprised to learn of the correct date. He reported to be feeling tired and sad but could not elaborate. He was surprised when asked about suicidal thoughts/intent/plan and denied any such thoughts recently or in the past. When informed about comment about IV tubing made to staff, pt referred to an earlier conflict with a staff member but could not logically explain how this related to thoughts of suicide. He denied any ongoing SI/intent/plan or past attempts. He denied AVH. Denied HI. Denied awareness of any psychiatric hx and deferred to his family for additional questions about his recent symptoms and history.    D/w pt's RN today. Pt has been somnolent, confused, believed himself to be at home earlier this morning, asking for something from the attic. he has not voiced any suicidal ideation to her. No observed self-harm.    Spoke with pt's daughter, Israel [phone], a clinical pharmacist, who has been speaking with pt daily. She reports pt has a history of "mild depression" but no significant depressive episodes, no history of SI/suicide attempts, takes mirtazapine for insomnia and Valium for chronic pain (believes pt to use excessively).  He is cognitively intact and very independent at baseline. She reports pt has been emotional and "weepy" this hospitalization, very worried about his recovery but has noticed significant improvement in the last day. She denies any concern about SI and states that pt, even when well, tends to make self-deprecating comments about wanting to die. We discussed adjustment sx and delirium and warning signs for worsening depression.    ISTOP reviewed Reference #: [id number] - Valium 10mg TID from a GI MD   10/22/2021	10/23/2021	diazepam 10 mg tablet	90	30	Mark Hodges	[id number]	[place]  [facility]  09/23/2021	09/25/2021	diazepam 10 mg tablet	90	90	Mark Hodges	[id number]	Insurance  Dispenser [facility]  09/02/2021	09/03/2021	hydrocodone-acetaminophen 7.5-325 mg tablet	60	30	Guille Bee MD	[id number]	Insurance

## 2021-11-12 NOTE — BH CONSULTATION LIAISON ASSESSMENT NOTE - NSICDXPASTMEDICALHX_GEN_ALL_CORE_FT
PAST MEDICAL HISTORY:  Chronic back pain     HTN (hypertension)     Major depression, chronic     No pertinent past medical history

## 2021-11-12 NOTE — PROGRESS NOTE ADULT - ASSESSMENT
per MICU    71 year old male with PMx of HTN, depression, chronic back pain who was transferred from [facility], where he was admitted on 11/2 after an unwitnessed fall (down for >1 day) and found to have acute hypoxic respiratory failure secondary to COVID-19, altered mental status and rhabdomyolysis. Due to increased work of breathing and oxygen requirements patient was upgraded to MICU for closer monitoring. Patient on HFNC and CPAP overnight, WOB has improved. Urine cultures positive for citrobacter koseri for which he is on Bactrim DS BID.       Problem/Plan - 1:  ·  Problem: Encephalopathy due to COVID-19 virus.   ·  Plan: - 2/2 COVID  vs psych vs language barrier  - UTI likely also contributing to patient's AMS  - met SIRS criteria 2/4  on admission  - Pt on diazepam 10 mg and Narco 7.5-325mg at home -- on hold due to somnolence  - s/p HFNC with CPAP QHS and remdesevir   - s/p decadron 10 mg IV for 10 days  - pt has a h/o depression and psych was consulted to rule out underlying psych component.    To do:  - f/u psych recs  - cw solumedrol 40 mg IV BID  - cw HFNC with a goal SpO2 of 88-90%.    Problem/Plan - 2:  ·  Problem: Acute respiratory failure with hypoxia.   ·  Plan: - COVID-19 Pneumonia: CXR with bilateral hazy opacities c/w viral COVID PNA  - not a candidate for Tocilizumab, no indication for antibiotics at this time, procalcitonin negative  - s/p Remdesivir    - D dimer uptrending from 2655 to 3549 and then down to 2844 on 11/11  - CTPE on 11/10: Acute PE segmental branches right upper lobe posterior segment and right lower lobe posterior basal segment. Extensive bilateral groundglass lung opacities with scattered patchy consolidations.  - CXR on 11/11  Bilateral asymmetrical opacities/left greater than right, stable.   - s/p decadron 10 mg IV for 10 days  - 2/2 PE and non IPF interstitial lung disease + hypersensitivity pneumonitis    To do:  - cw HFNC with a goal SpO2 of 88-90%  - cw solumedrol 40 mg IV BID  - cw full anticoagulation with lovenox 80 mg BID IV.    Problem/Plan - 3:  ·  Problem: Tachycardia.   ·  Plan: - likely due to increased metabolic demands i/s/o COVID infection  - echo with EF 55%, mild tricuspid regurg   - DVT study negative  - TSH normal    To do:  - continuous telemetry monitoring  - cw full anticoagulation with lovenox 80 mg BID IV.    Problem/Plan - 4:  ·  Problem: Hypertension.   ·  Plan: On home amlodipine and losartan     To do:  -cw amlodipine 10mg  -cw losartan 50 mg.    Problem/Plan - 5:  ·  Problem: Abnormal transaminases.   ·  Plan: RESOLVED.    Problem/Plan - 6:  ·  Problem: Urine retention.   ·  Plan: 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.    Problem/Plan - 7:  ·  Problem: Hypernatremia.   ·  Plan: RESOLVED.    Problem/Plan - 8:  ·  Problem: Prediabetes.   ·  Plan: - A1C 5.7%    To do:  - ISS as per protocol given steroid induced hyperglycemia  - monitor FSG   - stress lifestyle modifications.    Problem/Plan - 9:  ·  Problem: Chronic back pain.   ·  Plan: - would avoid adding home opioids at this time due to risk of oversedation.    Problem/Plan - 10:  ·  Problem: Nutrition, metabolism, and development symptoms.   ·  Plan; F: none  E: replete prn  N: DASH diet   A: lovenox 80mg daily BID  GI: Pantoprazole 40 mg daily   Dispo: MICU.

## 2021-11-12 NOTE — OCCUPATIONAL THERAPY INITIAL EVALUATION ADULT - ADDITIONAL COMMENTS
Pt is poor historian at time of eval. Per pt's chart, he was living in a private house with spouse, ~5 BEBET[address], and was independent for all mobility/ADLs with no need for AD/AE.

## 2021-11-12 NOTE — PROGRESS NOTE ADULT - PROBLEM SELECTOR PLAN 1
- 2/2 COVID  vs psych vs language barrier  - UTI likely also contributing to patient's AMS  - met SIRS criteria 2/4  on admission  - Pt on diazepam 10 mg and Narco 7.5-325mg at home -- on hold due to somnolence  - s/p HFNC with CPAP QHS and remdesevir   - s/p decadron 10 mg IV for 10 days  - pt has a h/o depression and psych was consulted to rule out underlying psych component.    To do:  - f/u psych recs  - cw solumedrol 40 mg IV BID  - cw HFNC with a goal SpO2 of 88-90%

## 2021-11-12 NOTE — PROGRESS NOTE ADULT - ASSESSMENT
71 year old male with PMx of HTN, depression, chronic back pain who was transferred from [facility], where he was admitted on 11/2 after an unwitnessed fall (down for >1 day) and found to have acute hypoxic respiratory failure secondary to COVID-19, altered mental status and rhabdomyolysis. Due to increased work of breathing and oxygen requirements patient was upgraded to MICU for closer monitoring. Patient on HFNC and CPAP overnight, WOB has improved. Urine cultures positive for citrobacter koseri for which he is on Bactrim DS BID.

## 2021-11-12 NOTE — PROGRESS NOTE ADULT - SUBJECTIVE AND OBJECTIVE BOX
Physical Medicine and Rehabilitation Progress Note:    Patient is a 71y old  Male who presents with a chief complaint of Acute hypoxic respiratory failure 2/2 covid pna (12 Nov 2021 08:25)      HPI:  71 year old male with PMx of HTN, depression, chronic back pain, transferred from [facility], where he was admitted on 11/2 after an unwitnessed fall (down for >1 day) and found to have acute hypoxic respiratory failure secondary to COVID-19 pneumonia and altered mental status. Pt is currently a poor historian, but per family, his mental status has been worsening in the past few days and he had an unwitnessed fall coming from the bathroom where he fell and remained on the floor for >1 day. He lives at home with his wife and normally ambulates independently. He is not vaccinated for covid.    Patient complained of overall weakness, but denied chest pain, SOB, fevers, diarrhea/constipation, LE pain/swelling. Virgen in place.    ED Course (at [place]):  Vitals: /91, HR 76/min, RR 16/min, 93% on RA -> 95% on 5 L NC  EKG Right bundle branch block  Abnormal Labs: Covid+. Plt 127, D-dimers 988, Trop-I 247 then 244 after 6 hrs, K 3.0, Mg 2.8, BUN 38, Alb 2.6, , ALT 96, [place] 6[address] then 9896 after 6 hrs, pH 7.51, pCO2 37, pO2 72, HCO3 30, Utox positive for BZD (home med). UA dirty.  Imaging: CXR: infiltration in mid/lower left lung field and milder amount in right lung field. Consistent with covid pna. CTH: No hydrocephalus, acute intracranial hemorrhage, mass effect, or brain edema.  Interventions: 300 ASA rectal, Lovenox 80mg x1, 2 L NS then 150 ml/hr for 6 hrs, Decadron 6mg IV, Potassium 10mg IV x3.      **Note on home meds: As per pt's daughter Manuela, pt takes Hydrocodone, Diazepam, Mirtazapine at home due to "back surgeries/back problems and depression." She does not know the full list or the doses. Pt also on unknown anti-hypertensives. Will call pharmacy in AM.   (03 Nov 2021 03:08)                            16.0   21.31 )-----------( 305      ( 12 Nov 2021 05:53 )             48.7       11-12    139  |  108  |  34<H>  ----------------------------<  123<H>  See Note   |  21<L>  |  0.79    Ca    9.7      12 Nov 2021 05:53  Phos  3.7     11-12  Mg     2.6     11-12    TPro  6.9  /  Alb  2.9<L>  /  TBili  0.6  /  DBili  x   /  AST  38  /  ALT  38  /  AlkPhos  80  11-11    Vital Signs Last 24 Hrs  T(C): 37 (12 Nov 2021 09:10), Max: 37.1 (11 Nov 2021 14:18)  T(F): 98.6 (12 Nov 2021 09:10), Max: 98.7 (11 Nov 2021 14:18)  HR: 98 (12 Nov 2021 13:00) (85 - 98)  BP: 138/80 (12 Nov 2021 13:00) (97/54 - 138/80)  BP(mean): 101 (12 Nov 2021 13:00) (69 - 105)  RR: 26 (12 Nov 2021 13:00) (16 - 26)  SpO2: 87% (12 Nov 2021 13:00) (86% - 95%)    MEDICATIONS  (STANDING):  amLODIPine   Tablet 10 milliGRAM(s) Oral every 24 hours  chlorhexidine 2% Cloths 1 Application(s) Topical <User Schedule>  enoxaparin Injectable 80 milliGRAM(s) SubCutaneous every 12 hours  insulin lispro (ADMELOG) corrective regimen sliding scale   SubCutaneous Before meals and at bedtime  losartan 50 milliGRAM(s) Oral daily  methylPREDNISolone sodium succinate Injectable 40 milliGRAM(s) IV Push every 12 hours  mirtazapine 30 milliGRAM(s) Oral at bedtime  pantoprazole    Tablet 40 milliGRAM(s) Oral before breakfast  polyethylene glycol 3350 17 Gram(s) Oral every 12 hours  senna 2 Tablet(s) Oral at bedtime  tamsulosin 0.4 milliGRAM(s) Oral at bedtime  trimethoprim  160 mG/sulfamethoxazole 800 mG 1 Tablet(s) Oral every 12 hours    MEDICATIONS  (PRN):  acetaminophen     Tablet .. 650 milliGRAM(s) Oral every 6 hours PRN Temp greater or equal to 38C (100.4F), Mild Pain (1 - 3)    Currently Undergoing Physical/ Occupational Therapy at bedside.    Functional Status Assessment:   11/11/2021        Cognitive/Perceptual/Neuro  Cognitive/Neuro/Behavioral [WDL Definition: Alert; opens eyes spontaneously; arouses to voice or touch; oriented x 4; follows commands; speech spontaneous, logical; purposeful motor response; behavior appropriate to situation]: WDL except  Level of Consciousness: lethargic;  alert  Arousal Level: arouses to voice  Orientation: disoriented to;  time;  situation  Speech: clear;  hypophonic  Mood/Behavior: hypoactive (quiet, withdrawn)    Language Assistance  Preferred Language to Address Healthcare Preferred Language to Address Healthcare: English    Therapeutic Interventions      Bed Mobility  Bed Mobility Training Rolling/Turning: moderate assist (50% patient effort);  hand over hand;  set-up required;  supervision;  verbal cues;  nonverbal cues (demo/gestures);  1 person assist;  bed rails;  B/L  Bed Mobility Training Scooting: moderate assist (50% patient effort);  2 person assist  Bed Mobility Training Sit-to-Supine: maximum assist (25% patient effort);  2 person assist;  set-up required;  supervision;  verbal cues;  nonverbal cues (demo/gestures)  Bed Mobility Training Supine-to-Sit: moderate assist (50% patient effort);  2 person assist;  set-up required;  verbal cues;  supervision;  nonverbal cues (demo/gestures)  Bed Mobility Training Limitations: impaired ability to control trunk for mobility;  cognitive, decreased safety awareness;  impaired balance;  decreased strength    Sit-Stand Transfer Training  Transfer Training Sit-to-Stand Transfer: maximum assist (25% patient effort);  2 person assist;  weight-bearing as tolerated   rolling walker  Transfer Training Stand-to-Sit Transfer: maximum assist (25% patient effort);  2 person assist;  weight-bearing as tolerated   rolling walker  Sit-to-Stand Transfer Training Transfer Safety Analysis: decreased weight-shifting ability;  decreased balance;  attempted x 2 trials;  decreased strength;  impaired balance;  impaired postural control;  rolling walker    Therapeutic Exercise  Therapeutic Exercise Detail: supine - SLR x 5 reps, UE flexion x 3 reps dangled at EOB with mod/Edna x 10 minutes with occasional left lateral leaning. Trunk balance training with drinking while sitting at EOB and wiping mouth. Ther ex - long arc quad, marching x 5 reps each.           PM&R Impression: as above    Current Disposition Plan Recommendations:    subacute rehab placement

## 2021-11-12 NOTE — PROGRESS NOTE ADULT - PROBLEM SELECTOR PLAN 2
- COVID-19 Pneumonia: CXR with bilateral hazy opacities c/w viral COVID PNA  - not a candidate for Tocilizumab, no indication for antibiotics at this time, procalcitonin negative  - s/p Remdesivir    - D dimer uptrending from 2655 to 3549 and then down to 2844 on 11/11  - CTPE on 11/10: Acute PE segmental branches right upper lobe posterior segment and right lower lobe posterior basal segment. Extensive bilateral groundglass lung opacities with scattered patchy consolidations.  - CXR on 11/11  Bilateral asymmetrical opacities/left greater than right, stable.   - s/p decadron 10 mg IV for 10 days  - 2/2 PE and non IPF interstitial lung disease + hypersensitivity pneumonitis    To do:  - cw HFNC with a goal SpO2 of 88-90%  - cw solumedrol 40 mg IV BID  - cw full anticoagulation with lovenox 80 mg BID IV

## 2021-11-12 NOTE — BH CONSULTATION LIAISON ASSESSMENT NOTE - NSBHCHARTREVIEWLAB_PSY_A_CORE FT
16.0   21.31 )-----------( 305      ( 12 Nov 2021 05:53 )             48.7     11-12    139  |  108  |  34<H>  ----------------------------<  123<H>  See Note   |  21<L>  |  0.79    Ca    9.7      12 Nov 2021 05:53  Phos  3.7     11-12  Mg     2.6     11-12    TPro  6.9  /  Alb  2.9<L>  /  TBili  0.6  /  DBili  x   /  AST  38  /  ALT  38  /  AlkPhos  80  11-11

## 2021-11-12 NOTE — OCCUPATIONAL THERAPY INITIAL EVALUATION ADULT - MODALITIES TREATMENT COMMENTS
Pt moustaphae at EOB ~10 mins with Mod Ax2 Pt dangle at EOB ~10 mins with Mod A with L sided leaning noted, able to correct with VCs and tactile cues.

## 2021-11-12 NOTE — PROGRESS NOTE ADULT - SUBJECTIVE AND OBJECTIVE BOX
OVERNIGHT EVENTS: patient failed TOV, retaining urine and straight cath done Overnight pt desat to around 85 on HFNC 40/50, so inc HFNC to 40/60    SUBJECTIVE / INTERVAL HPI: Patient seen and examined at bedside. He was laying comfortably and did not appear in acute distress. He denied any chest pain, SOB, N/V, headache, abd pain, numbness or weakness.    VITAL SIGNS:  Vital Signs Last 24 Hrs  T(C): 37 (12 Nov 2021 09:10), Max: 37.1 (11 Nov 2021 14:18)  T(F): 98.6 (12 Nov 2021 09:10), Max: 98.7 (11 Nov 2021 14:18)  HR: 97 (12 Nov 2021 11:00) (85 - 102)  BP: 97/70 (12 Nov 2021 11:00) (97/54 - 136/84)  BP(mean): 80 (12 Nov 2021 11:00) (69 - 105)  RR: 16 (12 Nov 2021 11:00) (16 - 28)  SpO2: 89% (12 Nov 2021 11:00) (86% - 95%)    PHYSICAL EXAM:    GENERAL: resting comfortably and did not appear in acute distess  HEENT: Normocephalic;  conjunctivae and sclerae clear; dry mucous membranes   NECK : supple  CHEST/LUNG: minimal crackles and rhonchi b/l   HEART: S1 S2  regular; no murmurs, gallops or rubs  ABDOMEN: Soft, Nontender, Nondistended; Bowel sounds present  EXTREMITIES: no cyanosis; no calf tenderness, trace LE edema  SKIN: warm and dry  NERVOUS SYSTEM: no focal neurological deficit. CN II-XII grossly intact,     MEDICATIONS:  MEDICATIONS  (STANDING):  amLODIPine   Tablet 10 milliGRAM(s) Oral every 24 hours  chlorhexidine 2% Cloths 1 Application(s) Topical <User Schedule>  enoxaparin Injectable 80 milliGRAM(s) SubCutaneous every 12 hours  insulin lispro (ADMELOG) corrective regimen sliding scale   SubCutaneous Before meals and at bedtime  losartan 50 milliGRAM(s) Oral daily  methylPREDNISolone sodium succinate Injectable 40 milliGRAM(s) IV Push every 12 hours  mirtazapine 30 milliGRAM(s) Oral at bedtime  pantoprazole    Tablet 40 milliGRAM(s) Oral before breakfast  polyethylene glycol 3350 17 Gram(s) Oral every 12 hours  senna 2 Tablet(s) Oral at bedtime  tamsulosin 0.4 milliGRAM(s) Oral at bedtime  trimethoprim  160 mG/sulfamethoxazole 800 mG 1 Tablet(s) Oral every 12 hours    MEDICATIONS  (PRN):  acetaminophen     Tablet .. 650 milliGRAM(s) Oral every 6 hours PRN Temp greater or equal to 38C (100.4F), Mild Pain (1 - 3)      ALLERGIES:  Allergies    Cipro (Rash)    Intolerances    Cipro (Other)      LABS:                        16.0   21.31 )-----------( 305      ( 12 Nov 2021 05:53 )             48.7     11-12    139  |  108  |  34<H>  ----------------------------<  123<H>  See Note   |  21<L>  |  0.79    Ca    9.7      12 Nov 2021 05:53  Phos  3.7     11-12  Mg     2.6     11-12    TPro  6.9  /  Alb  2.9<L>  /  TBili  0.6  /  DBili  x   /  AST  38  /  ALT  38  /  AlkPhos  80  11-11        CAPILLARY BLOOD GLUCOSE      POCT Blood Glucose.: 104 mg/dL (12 Nov 2021 11:28)      RADIOLOGY & ADDITIONAL TESTS: Reviewed.

## 2021-11-12 NOTE — OCCUPATIONAL THERAPY INITIAL EVALUATION ADULT - GENERAL OBSERVATIONS, REHAB EVAL
Pt received semi supine in bed sleeping, +O2 highflow NC, +IV, +tele. Pt A&O 1-2 with decreased arousal, attention, difficulty following commands, and tolerated session poorly. Pt received semi supine in bed sleeping, +O2 highflow NC, +IV, + rectal probe, +tele. Pt A&O 1-2 with decreased arousal, attention, difficulty following commands, and tolerated session poorly.

## 2021-11-12 NOTE — BH CONSULTATION LIAISON ASSESSMENT NOTE - NSBHCHARTREVIEWVS_PSY_A_CORE FT
Vital Signs Last 24 Hrs  T(C): 37 (12 Nov 2021 09:10), Max: 37.1 (11 Nov 2021 14:18)  T(F): 98.6 (12 Nov 2021 09:10), Max: 98.7 (11 Nov 2021 14:18)  HR: 92 (12 Nov 2021 12:00) (85 - 102)  BP: 105/72 (12 Nov 2021 12:00) (97/54 - 136/84)  BP(mean): 85 (12 Nov 2021 12:00) (69 - 105)  RR: 19 (12 Nov 2021 12:00) (16 - 28)  SpO2: 93% (12 Nov 2021 12:00) (86% - 95%)

## 2021-11-12 NOTE — BH CONSULTATION LIAISON ASSESSMENT NOTE - RISK ASSESSMENT
at moderately low risk for suicide - no current SI, no persisting SI with suspicion that initial SI comment made during acute confusional state. Currently forward thinking, treatment seeking with protective factors including strong family support, residential stability  static risk factors include ?history of depression, age, sex  modifiable risk factors include acute illness, ICU setting

## 2021-11-12 NOTE — BH CONSULTATION LIAISON ASSESSMENT NOTE - CURRENT MEDICATION
MEDICATIONS  (STANDING):  amLODIPine   Tablet 10 milliGRAM(s) Oral every 24 hours  chlorhexidine 2% Cloths 1 Application(s) Topical <User Schedule>  enoxaparin Injectable 80 milliGRAM(s) SubCutaneous every 12 hours  insulin lispro (ADMELOG) corrective regimen sliding scale   SubCutaneous Before meals and at bedtime  losartan 50 milliGRAM(s) Oral daily  methylPREDNISolone sodium succinate Injectable 40 milliGRAM(s) IV Push every 12 hours  mirtazapine 30 milliGRAM(s) Oral at bedtime  pantoprazole    Tablet 40 milliGRAM(s) Oral before breakfast  polyethylene glycol 3350 17 Gram(s) Oral every 12 hours  senna 2 Tablet(s) Oral at bedtime  tamsulosin 0.4 milliGRAM(s) Oral at bedtime  trimethoprim  160 mG/sulfamethoxazole 800 mG 1 Tablet(s) Oral every 12 hours    MEDICATIONS  (PRN):  acetaminophen     Tablet .. 650 milliGRAM(s) Oral every 6 hours PRN Temp greater or equal to 38C (100.4F), Mild Pain (1 - 3)

## 2021-11-12 NOTE — OCCUPATIONAL THERAPY INITIAL EVALUATION ADULT - DIAGNOSIS, OT EVAL
Pt presents with impaired cognition, activity tolerance/endurance, strength, postural control, balance, and coordination, affecting ability to perform ADLs/mobility.

## 2021-11-12 NOTE — PROGRESS NOTE ADULT - PROBLEM SELECTOR PLAN 6
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

## 2021-11-12 NOTE — OCCUPATIONAL THERAPY INITIAL EVALUATION ADULT - PERTINENT HX OF CURRENT PROBLEM, REHAB EVAL
Pt is a 70 yo male transferred from [facility], admitted for acute hypoxic respiratory failure secondary to COVID-19 pneumonia, altered mental status, and recent fall

## 2021-11-12 NOTE — BH CONSULTATION LIAISON ASSESSMENT NOTE - DIFFERENTIAL
Hypoactive delirium  Depression by history  Benzodiazepine dependence by history  possible adjustment d/o

## 2021-11-12 NOTE — BH CONSULTATION LIAISON ASSESSMENT NOTE - OTHER
no abnormalities during interview motor exam deferred due to infectious precautions confused content, unclear if any delusional thinking. no current SI or HI coherent at times but often becoming illogical. at times tangential

## 2021-11-13 LAB
ANION GAP SERPL CALC-SCNC: 9 MMOL/L — SIGNIFICANT CHANGE UP (ref 5–17)
BUN SERPL-MCNC: 31 MG/DL — HIGH (ref 7–23)
CALCIUM SERPL-MCNC: 9.9 MG/DL — SIGNIFICANT CHANGE UP (ref 8.4–10.5)
CHLORIDE SERPL-SCNC: 106 MMOL/L — SIGNIFICANT CHANGE UP (ref 96–108)
CO2 SERPL-SCNC: 23 MMOL/L — SIGNIFICANT CHANGE UP (ref 22–31)
CREAT SERPL-MCNC: 0.86 MG/DL — SIGNIFICANT CHANGE UP (ref 0.5–1.3)
GLUCOSE BLDC GLUCOMTR-MCNC: 109 MG/DL — HIGH (ref 70–99)
GLUCOSE BLDC GLUCOMTR-MCNC: 110 MG/DL — HIGH (ref 70–99)
GLUCOSE BLDC GLUCOMTR-MCNC: 122 MG/DL — HIGH (ref 70–99)
GLUCOSE BLDC GLUCOMTR-MCNC: 128 MG/DL — HIGH (ref 70–99)
GLUCOSE SERPL-MCNC: 136 MG/DL — HIGH (ref 70–99)
HCT VFR BLD CALC: 47.9 % — SIGNIFICANT CHANGE UP (ref 39–50)
HGB BLD-MCNC: 15.5 G/DL — SIGNIFICANT CHANGE UP (ref 13–17)
MAGNESIUM SERPL-MCNC: 2.5 MG/DL — SIGNIFICANT CHANGE UP (ref 1.6–2.6)
MCHC RBC-ENTMCNC: 29.1 PG — SIGNIFICANT CHANGE UP (ref 27–34)
MCHC RBC-ENTMCNC: 32.4 GM/DL — SIGNIFICANT CHANGE UP (ref 32–36)
MCV RBC AUTO: 89.9 FL — SIGNIFICANT CHANGE UP (ref 80–100)
NRBC # BLD: 0 /100 WBCS — SIGNIFICANT CHANGE UP (ref 0–0)
PHOSPHATE SERPL-MCNC: 3.5 MG/DL — SIGNIFICANT CHANGE UP (ref 2.5–4.5)
PLATELET # BLD AUTO: 289 K/UL — SIGNIFICANT CHANGE UP (ref 150–400)
POTASSIUM SERPL-MCNC: 5.1 MMOL/L — SIGNIFICANT CHANGE UP (ref 3.5–5.3)
POTASSIUM SERPL-SCNC: 5.1 MMOL/L — SIGNIFICANT CHANGE UP (ref 3.5–5.3)
RBC # BLD: 5.33 M/UL — SIGNIFICANT CHANGE UP (ref 4.2–5.8)
RBC # FLD: 14.5 % — SIGNIFICANT CHANGE UP (ref 10.3–14.5)
SODIUM SERPL-SCNC: 138 MMOL/L — SIGNIFICANT CHANGE UP (ref 135–145)
WBC # BLD: 19.09 K/UL — HIGH (ref 3.8–10.5)
WBC # FLD AUTO: 19.09 K/UL — HIGH (ref 3.8–10.5)

## 2021-11-13 PROCEDURE — 99233 SBSQ HOSP IP/OBS HIGH 50: CPT | Mod: GC

## 2021-11-13 PROCEDURE — 93010 ELECTROCARDIOGRAM REPORT: CPT

## 2021-11-13 PROCEDURE — 71045 X-RAY EXAM CHEST 1 VIEW: CPT | Mod: 26

## 2021-11-13 RX ORDER — HALOPERIDOL DECANOATE 100 MG/ML
5 INJECTION INTRAMUSCULAR ONCE
Refills: 0 | Status: COMPLETED | OUTPATIENT
Start: 2021-11-13 | End: 2021-11-14

## 2021-11-13 RX ORDER — DIAZEPAM 5 MG
10 TABLET ORAL ONCE
Refills: 0 | Status: DISCONTINUED | OUTPATIENT
Start: 2021-11-13 | End: 2021-11-13

## 2021-11-13 RX ORDER — OLANZAPINE 15 MG/1
5 TABLET, FILM COATED ORAL ONCE
Refills: 0 | Status: COMPLETED | OUTPATIENT
Start: 2021-11-13 | End: 2021-11-13

## 2021-11-13 RX ADMIN — AMLODIPINE BESYLATE 10 MILLIGRAM(S): 2.5 TABLET ORAL at 06:21

## 2021-11-13 RX ADMIN — ENOXAPARIN SODIUM 80 MILLIGRAM(S): 100 INJECTION SUBCUTANEOUS at 11:16

## 2021-11-13 RX ADMIN — Medication 1 TABLET(S): at 21:40

## 2021-11-13 RX ADMIN — LOSARTAN POTASSIUM 50 MILLIGRAM(S): 100 TABLET, FILM COATED ORAL at 11:16

## 2021-11-13 RX ADMIN — POLYETHYLENE GLYCOL 3350 17 GRAM(S): 17 POWDER, FOR SOLUTION ORAL at 05:45

## 2021-11-13 RX ADMIN — OLANZAPINE 5 MILLIGRAM(S): 15 TABLET, FILM COATED ORAL at 22:39

## 2021-11-13 RX ADMIN — ENOXAPARIN SODIUM 80 MILLIGRAM(S): 100 INJECTION SUBCUTANEOUS at 23:14

## 2021-11-13 RX ADMIN — Medication 40 MILLIGRAM(S): at 23:15

## 2021-11-13 RX ADMIN — PANTOPRAZOLE SODIUM 40 MILLIGRAM(S): 20 TABLET, DELAYED RELEASE ORAL at 06:21

## 2021-11-13 RX ADMIN — CHLORHEXIDINE GLUCONATE 1 APPLICATION(S): 213 SOLUTION TOPICAL at 06:22

## 2021-11-13 RX ADMIN — SENNA PLUS 2 TABLET(S): 8.6 TABLET ORAL at 21:40

## 2021-11-13 RX ADMIN — Medication 40 MILLIGRAM(S): at 11:16

## 2021-11-13 RX ADMIN — MIRTAZAPINE 30 MILLIGRAM(S): 45 TABLET, ORALLY DISINTEGRATING ORAL at 21:40

## 2021-11-13 RX ADMIN — TAMSULOSIN HYDROCHLORIDE 0.4 MILLIGRAM(S): 0.4 CAPSULE ORAL at 21:40

## 2021-11-13 RX ADMIN — Medication 1 TABLET(S): at 11:15

## 2021-11-13 RX ADMIN — Medication 10 MILLIGRAM(S): at 19:45

## 2021-11-13 NOTE — PROGRESS NOTE ADULT - PROBLEM SELECTOR PLAN 1
- 2/2 COVID  vs psych vs language barrier  - UTI likely also contributing to patient's AMS  - met SIRS criteria 2/4  on admission  - Pt on diazepam 10 mg and Narco 7.5-325mg at home -- on hold due to somnolence  - s/p HFNC with CPAP QHS and remdesevir   - s/p decadron 10 mg IV for 10 days  - pt has a h/o depression and psych was consulted to rule out underlying psych component.    To do:  - f/u psych recs  - cw solumedrol 40 mg IV BID

## 2021-11-13 NOTE — PROGRESS NOTE ADULT - PROBLEM SELECTOR PLAN 3
- likely due to increased metabolic demands i/s/o COVID infection as well as PE  - echo with EF 55%, mild tricuspid regurg   - DVT study negative  - CTPE +  - TSH normal    To do:  - continuous telemetry monitoring  -  full anticoagulation with lovenox 80 mg BID IV

## 2021-11-13 NOTE — PROGRESS NOTE ADULT - PROBLEM SELECTOR PLAN 2
- COVID-19 Pneumonia: CXR with bilateral hazy opacities c/w viral COVID PNA  - not a candidate for Tocilizumab, no indication for antibiotics at this time, procalcitonin negative  - s/p Remdesivir    - D dimer uptrending from 2655 to 3549 and then down to 2844 on 11/11  - CTPE on 11/10: Acute PE segmental branches right upper lobe posterior segment and right lower lobe posterior basal segment. Extensive bilateral groundglass lung opacities with scattered patchy consolidations.  - CXR on 11/11  Bilateral asymmetrical opacities/left greater than right, stable.   - s/p decadron 10 mg IV for 10 days  - 2/2 PE and non IPF interstitial lung disease + hypersensitivity pneumonitis  -- cw HFNC with a goal SpO2 of 88-90% - continue to wean oxygen as tolerated, currently on 40/50    To do:  - cw HFNC with a goal SpO2 of 88-90%  - cw solumedrol 40 mg IV BID  - cw full anticoagulation with lovenox 80 mg BID IV    #PE  -per CT chest on 11/10 Acute PE right lower lobe posterior basal segmental arteries and right upper lobe posterior segment pulmonary artery. acute pneumonitis  -continue on full AC with Lovenox 80mg BID IV  -continue to wean HFNC requirements

## 2021-11-13 NOTE — PROGRESS NOTE ADULT - ASSESSMENT
71 year old male with PMx of HTN, depression, chronic back pain who was transferred from [facility], where he was admitted on 11/2 after an unwitnessed fall (down for >1 day) and found to have acute hypoxic respiratory failure secondary to COVID-19, altered mental status and rhabdomyolysis. Due to increased work of breathing and oxygen requirements patient was upgraded to MICU for closer monitoring. Patient on HFNC, WOB has improved. Urine cultures positive for citrobacter koseri for which he is on Bactrim DS BID. Continue to wean oxygen requirements as tolerated.

## 2021-11-13 NOTE — PROGRESS NOTE ADULT - PROBLEM SELECTOR PLAN 6
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

## 2021-11-13 NOTE — PROGRESS NOTE ADULT - SUBJECTIVE AND OBJECTIVE BOX
**INCOMPLETE NOTE    OVERNIGHT EVENTS:    SUBJECTIVE:  Patient seen and examined at bedside.    Vital Signs Last 12 Hrs  T(F): 99.5 (11-13-21 @ 06:05), Max: 99.5 (11-13-21 @ 06:05)  HR: 81 (11-13-21 @ 13:00) (81 - 97)  BP: 104/64 (11-13-21 @ 13:00) (104/64 - 141/71)  BP(mean): 79 (11-13-21 @ 13:00) (79 - 100)  RR: 17 (11-13-21 @ 13:00) (16 - 23)  SpO2: 97% (11-13-21 @ 13:00) (89% - 97%)  I&O's Summary    12 Nov 2021 07:01  -  13 Nov 2021 07:00  --------------------------------------------------------  IN: 825 mL / OUT: 2750 mL / NET: -1925 mL    13 Nov 2021 07:01  -  13 Nov 2021 13:24  --------------------------------------------------------  IN: 0 mL / OUT: 625 mL / NET: -625 mL        PHYSICAL EXAM:  Constitutional: NAD, comfortable in bed.  HEENT: NC/AT, PERRLA, EOMI, no conjunctival pallor or scleral icterus, MMM  Neck: Supple, no JVD  Respiratory: CTA B/L. No w/r/r.   Cardiovascular: RRR, normal S1 and S2, no m/r/g.   Gastrointestinal: +BS, soft NTND, no guarding or rebound tenderness, no palpable masses   Extremities: wwp; no cyanosis, clubbing or edema.   Vascular: Pulses equal and strong throughout.   Neurological: AAOx3, no CN deficits, strength and sensation intact throughout.   Skin: No gross skin abnormalities or rashes        LABS:                        15.5   19.09 )-----------( 289      ( 13 Nov 2021 05:27 )             47.9     11-13    138  |  106  |  31<H>  ----------------------------<  136<H>  5.1   |  23  |  0.86    Ca    9.9      13 Nov 2021 05:27  Phos  3.5     11-13  Mg     2.5     11-13              RADIOLOGY & ADDITIONAL TESTS:    MEDICATIONS  (STANDING):  amLODIPine   Tablet 10 milliGRAM(s) Oral every 24 hours  chlorhexidine 2% Cloths 1 Application(s) Topical <User Schedule>  enoxaparin Injectable 80 milliGRAM(s) SubCutaneous every 12 hours  insulin lispro (ADMELOG) corrective regimen sliding scale   SubCutaneous Before meals and at bedtime  losartan 50 milliGRAM(s) Oral daily  methylPREDNISolone sodium succinate Injectable 40 milliGRAM(s) IV Push every 12 hours  mirtazapine 30 milliGRAM(s) Oral at bedtime  pantoprazole    Tablet 40 milliGRAM(s) Oral before breakfast  polyethylene glycol 3350 17 Gram(s) Oral every 12 hours  senna 2 Tablet(s) Oral at bedtime  tamsulosin 0.4 milliGRAM(s) Oral at bedtime  trimethoprim  160 mG/sulfamethoxazole 800 mG 1 Tablet(s) Oral every 12 hours    MEDICATIONS  (PRN):  acetaminophen     Tablet .. 650 milliGRAM(s) Oral every 6 hours PRN Temp greater or equal to 38C (100.4F), Mild Pain (1 - 3)   OVERNIGHT EVENTS: retained 1L of urine, requiring straight cath.    SUBJECTIVE:  Patient seen and examined at bedside. Denies chest pain, shortness of breath, abdominal pain, n/v.    ROS negative unless stated above    Vital Signs Last 12 Hrs  T(F): 99.5 (11-13-21 @ 06:05), Max: 99.5 (11-13-21 @ 06:05)  HR: 81 (11-13-21 @ 13:00) (81 - 97)  BP: 104/64 (11-13-21 @ 13:00) (104/64 - 141/71)  BP(mean): 79 (11-13-21 @ 13:00) (79 - 100)  RR: 17 (11-13-21 @ 13:00) (16 - 23)  SpO2: 97% (11-13-21 @ 13:00) (89% - 97%)  I&O's Summary    12 Nov 2021 07:01  -  13 Nov 2021 07:00  --------------------------------------------------------  IN: 825 mL / OUT: 2750 mL / NET: -1925 mL    13 Nov 2021 07:01  -  13 Nov 2021 13:24  --------------------------------------------------------  IN: 0 mL / OUT: 625 mL / NET: -625 mL        PHYSICAL EXAM:  GENERAL: resting comfortably and did not appear in acute distress. HFNC in place  HEENT: Normocephalic;  conjunctivae and sclerae clear; dry mucous membranes   NECK : supple  CHEST/LUNG: minimal crackles bilaterally  HEART: S1 S2  regular; no murmurs, gallops or rubs  ABDOMEN: Soft, Nontender, Nondistended; Bowel sounds present  EXTREMITIES: no cyanosis; no calf tenderness, trace LE edema  SKIN: warm and dry  NERVOUS SYSTEM: no focal neurological deficit. CN II-XII grossly intact,     LABS:                        15.5   19.09 )-----------( 289      ( 13 Nov 2021 05:27 )             47.9     11-13    138  |  106  |  31<H>  ----------------------------<  136<H>  5.1   |  23  |  0.86    Ca    9.9      13 Nov 2021 05:27  Phos  3.5     11-13  Mg     2.5     11-13              RADIOLOGY & ADDITIONAL TESTS:    MEDICATIONS  (STANDING):  amLODIPine   Tablet 10 milliGRAM(s) Oral every 24 hours  chlorhexidine 2% Cloths 1 Application(s) Topical <User Schedule>  enoxaparin Injectable 80 milliGRAM(s) SubCutaneous every 12 hours  insulin lispro (ADMELOG) corrective regimen sliding scale   SubCutaneous Before meals and at bedtime  losartan 50 milliGRAM(s) Oral daily  methylPREDNISolone sodium succinate Injectable 40 milliGRAM(s) IV Push every 12 hours  mirtazapine 30 milliGRAM(s) Oral at bedtime  pantoprazole    Tablet 40 milliGRAM(s) Oral before breakfast  polyethylene glycol 3350 17 Gram(s) Oral every 12 hours  senna 2 Tablet(s) Oral at bedtime  tamsulosin 0.4 milliGRAM(s) Oral at bedtime  trimethoprim  160 mG/sulfamethoxazole 800 mG 1 Tablet(s) Oral every 12 hours    MEDICATIONS  (PRN):  acetaminophen     Tablet .. 650 milliGRAM(s) Oral every 6 hours PRN Temp greater or equal to 38C (100.4F), Mild Pain (1 - 3)

## 2021-11-14 LAB
24R-OH-CALCIDIOL SERPL-MCNC: 38.6 NG/ML — SIGNIFICANT CHANGE UP (ref 30–80)
ALBUMIN SERPL ELPH-MCNC: 2.9 G/DL — LOW (ref 3.3–5)
ALP SERPL-CCNC: 76 U/L — SIGNIFICANT CHANGE UP (ref 40–120)
ALT FLD-CCNC: 29 U/L — SIGNIFICANT CHANGE UP (ref 10–45)
ANION GAP SERPL CALC-SCNC: 6 MMOL/L — SIGNIFICANT CHANGE UP (ref 5–17)
AST SERPL-CCNC: 31 U/L — SIGNIFICANT CHANGE UP (ref 10–40)
BASOPHILS # BLD AUTO: 0.03 K/UL — SIGNIFICANT CHANGE UP (ref 0–0.2)
BASOPHILS NFR BLD AUTO: 0.2 % — SIGNIFICANT CHANGE UP (ref 0–2)
BILIRUB SERPL-MCNC: 0.6 MG/DL — SIGNIFICANT CHANGE UP (ref 0.2–1.2)
BUN SERPL-MCNC: 32 MG/DL — HIGH (ref 7–23)
CALCIUM SERPL-MCNC: 9.9 MG/DL — SIGNIFICANT CHANGE UP (ref 8.4–10.5)
CHLORIDE SERPL-SCNC: 102 MMOL/L — SIGNIFICANT CHANGE UP (ref 96–108)
CO2 SERPL-SCNC: 24 MMOL/L — SIGNIFICANT CHANGE UP (ref 22–31)
CREAT SERPL-MCNC: 0.77 MG/DL — SIGNIFICANT CHANGE UP (ref 0.5–1.3)
EOSINOPHIL # BLD AUTO: 0 K/UL — SIGNIFICANT CHANGE UP (ref 0–0.5)
EOSINOPHIL NFR BLD AUTO: 0 % — SIGNIFICANT CHANGE UP (ref 0–6)
GLUCOSE BLDC GLUCOMTR-MCNC: 123 MG/DL — HIGH (ref 70–99)
GLUCOSE BLDC GLUCOMTR-MCNC: 152 MG/DL — HIGH (ref 70–99)
GLUCOSE BLDC GLUCOMTR-MCNC: 169 MG/DL — HIGH (ref 70–99)
GLUCOSE SERPL-MCNC: 152 MG/DL — HIGH (ref 70–99)
HCT VFR BLD CALC: 44.6 % — SIGNIFICANT CHANGE UP (ref 39–50)
HGB BLD-MCNC: 15.1 G/DL — SIGNIFICANT CHANGE UP (ref 13–17)
IMM GRANULOCYTES NFR BLD AUTO: 1.2 % — SIGNIFICANT CHANGE UP (ref 0–1.5)
LYMPHOCYTES # BLD AUTO: 0.95 K/UL — LOW (ref 1–3.3)
LYMPHOCYTES # BLD AUTO: 5 % — LOW (ref 13–44)
MAGNESIUM SERPL-MCNC: 2.4 MG/DL — SIGNIFICANT CHANGE UP (ref 1.6–2.6)
MCHC RBC-ENTMCNC: 30.4 PG — SIGNIFICANT CHANGE UP (ref 27–34)
MCHC RBC-ENTMCNC: 33.9 GM/DL — SIGNIFICANT CHANGE UP (ref 32–36)
MCV RBC AUTO: 89.9 FL — SIGNIFICANT CHANGE UP (ref 80–100)
MONOCYTES # BLD AUTO: 0.64 K/UL — SIGNIFICANT CHANGE UP (ref 0–0.9)
MONOCYTES NFR BLD AUTO: 3.4 % — SIGNIFICANT CHANGE UP (ref 2–14)
NEUTROPHILS # BLD AUTO: 17.19 K/UL — HIGH (ref 1.8–7.4)
NEUTROPHILS NFR BLD AUTO: 90.2 % — HIGH (ref 43–77)
NRBC # BLD: 0 /100 WBCS — SIGNIFICANT CHANGE UP (ref 0–0)
PHOSPHATE SERPL-MCNC: 4 MG/DL — SIGNIFICANT CHANGE UP (ref 2.5–4.5)
PLATELET # BLD AUTO: 270 K/UL — SIGNIFICANT CHANGE UP (ref 150–400)
POTASSIUM SERPL-MCNC: 5.1 MMOL/L — SIGNIFICANT CHANGE UP (ref 3.5–5.3)
POTASSIUM SERPL-SCNC: 5.1 MMOL/L — SIGNIFICANT CHANGE UP (ref 3.5–5.3)
PROT SERPL-MCNC: 6.5 G/DL — SIGNIFICANT CHANGE UP (ref 6–8.3)
RBC # BLD: 4.96 M/UL — SIGNIFICANT CHANGE UP (ref 4.2–5.8)
RBC # FLD: 13.8 % — SIGNIFICANT CHANGE UP (ref 10.3–14.5)
SARS-COV-2 RNA SPEC QL NAA+PROBE: DETECTED
SODIUM SERPL-SCNC: 132 MMOL/L — LOW (ref 135–145)
WBC # BLD: 19.03 K/UL — HIGH (ref 3.8–10.5)
WBC # FLD AUTO: 19.03 K/UL — HIGH (ref 3.8–10.5)

## 2021-11-14 PROCEDURE — 99233 SBSQ HOSP IP/OBS HIGH 50: CPT | Mod: GC

## 2021-11-14 RX ORDER — DIAZEPAM 5 MG
2 TABLET ORAL ONCE
Refills: 0 | Status: DISCONTINUED | OUTPATIENT
Start: 2021-11-14 | End: 2021-11-14

## 2021-11-14 RX ADMIN — MIRTAZAPINE 30 MILLIGRAM(S): 45 TABLET, ORALLY DISINTEGRATING ORAL at 22:06

## 2021-11-14 RX ADMIN — Medication 30 MILLIGRAM(S): at 11:22

## 2021-11-14 RX ADMIN — HALOPERIDOL DECANOATE 5 MILLIGRAM(S): 100 INJECTION INTRAMUSCULAR at 00:12

## 2021-11-14 RX ADMIN — AMLODIPINE BESYLATE 10 MILLIGRAM(S): 2.5 TABLET ORAL at 09:36

## 2021-11-14 RX ADMIN — Medication 1 TABLET(S): at 11:23

## 2021-11-14 RX ADMIN — ENOXAPARIN SODIUM 80 MILLIGRAM(S): 100 INJECTION SUBCUTANEOUS at 23:02

## 2021-11-14 RX ADMIN — Medication 1: at 21:58

## 2021-11-14 RX ADMIN — POLYETHYLENE GLYCOL 3350 17 GRAM(S): 17 POWDER, FOR SOLUTION ORAL at 06:02

## 2021-11-14 RX ADMIN — Medication 1 TABLET(S): at 22:06

## 2021-11-14 RX ADMIN — PANTOPRAZOLE SODIUM 40 MILLIGRAM(S): 20 TABLET, DELAYED RELEASE ORAL at 06:02

## 2021-11-14 RX ADMIN — TAMSULOSIN HYDROCHLORIDE 0.4 MILLIGRAM(S): 0.4 CAPSULE ORAL at 22:06

## 2021-11-14 RX ADMIN — CHLORHEXIDINE GLUCONATE 1 APPLICATION(S): 213 SOLUTION TOPICAL at 06:03

## 2021-11-14 RX ADMIN — ENOXAPARIN SODIUM 80 MILLIGRAM(S): 100 INJECTION SUBCUTANEOUS at 11:23

## 2021-11-14 RX ADMIN — Medication 2 MILLIGRAM(S): at 22:06

## 2021-11-14 RX ADMIN — Medication 30 MILLIGRAM(S): at 17:15

## 2021-11-14 RX ADMIN — LOSARTAN POTASSIUM 50 MILLIGRAM(S): 100 TABLET, FILM COATED ORAL at 11:23

## 2021-11-14 RX ADMIN — SENNA PLUS 2 TABLET(S): 8.6 TABLET ORAL at 22:06

## 2021-11-14 RX ADMIN — POLYETHYLENE GLYCOL 3350 17 GRAM(S): 17 POWDER, FOR SOLUTION ORAL at 17:15

## 2021-11-14 RX ADMIN — Medication 1: at 12:21

## 2021-11-14 RX ADMIN — Medication 1 TABLET(S): at 09:36

## 2021-11-14 NOTE — PROGRESS NOTE ADULT - ASSESSMENT
71 year old male with PMx of HTN, depression, chronic back pain who was transferred from [facility], where he was admitted on 11/2 after an unwitnessed fall (down for >1 day) and found to have acute hypoxic respiratory failure secondary to COVID-19, altered mental status and rhabdomyolysis. Due to increased work of breathing and oxygen requirements patient was upgraded to MICU for closer monitoring. Patient on HFNC, WOB has improved. Urine cultures positive for citrobacter koseri for which he is on Bactrim DS BID. Continue to wean oxygen requirements as tolerated.

## 2021-11-14 NOTE — PROGRESS NOTE ADULT - PROBLEM SELECTOR PLAN 1
- 2/2 COVID  vs psych vs language barrier  - UTI likely also contributing to patient's AMS  - met SIRS criteria 2/4  on admission  - Pt on diazepam 10 mg and Narco 7.5-325mg at home -- on hold due to somnolence  - s/p HFNC with CPAP QHS and remdesevir   - s/p decadron 10 mg IV for 10 days  - pt has a h/o depression and psych was consulted to rule out underlying psych component.    To do:  - f/u psych recs  - cw solumedrol 30 mg IV BID

## 2021-11-14 NOTE — SWALLOW BEDSIDE ASSESSMENT ADULT - SPECIFY REASON(S)
To assess swallow function in setting of new difficulty swallowing today per team, coughing with liquids

## 2021-11-14 NOTE — SWALLOW BEDSIDE ASSESSMENT ADULT - NS SPL SWALLOW CLINIC TRIAL FT
Mild SOB intermittently throughout trials, with desat x1 to high 80s, benefited from rest breaks. O2 sats typically in low 90s per RN. Immediate, prolonged coughing x2 following thin liquid trials, suggestive of aspiration. No overt signs of aspiration with mildly thick liquids. Pt declined chewable solid trials ("maybe later").

## 2021-11-14 NOTE — PROGRESS NOTE ADULT - PROBLEM SELECTOR PLAN 2
- COVID-19 Pneumonia: CXR with bilateral hazy opacities c/w viral COVID PNA  - not a candidate for Tocilizumab, no indication for antibiotics at this time, procalcitonin negative  - s/p Remdesivir    - D dimer uptrending from 2655 to 3549 and then down to 2844 on 11/11  - CTPE on 11/10: Acute PE segmental branches right upper lobe posterior segment and right lower lobe posterior basal segment. Extensive bilateral groundglass lung opacities with scattered patchy consolidations.  - CXR on 11/11  Bilateral asymmetrical opacities/left greater than right, stable.   - s/p decadron 10 mg IV for 10 days  - 2/2 PE and non IPF interstitial lung disease + hypersensitivity pneumonitis  -- cw HFNC with a goal SpO2 of 88-90% - continue to wean oxygen as tolerated, currently on 40/50    To do:  - cw HFNC alternate with NC with a goal SpO2 of 88-90%  - cw solumedrol 30 mg IV BID  - cw full anticoagulation with lovenox 80 mg BID IV    #PE  -per CT chest on 11/10 Acute PE right lower lobe posterior basal segmental arteries and right upper lobe posterior segment pulmonary artery. acute pneumonitis  -continue on full AC with Lovenox 80mg BID IV  -continue to wean HFNC requirements with NC with a goal SpO2 of 88-90%

## 2021-11-14 NOTE — PROGRESS NOTE ADULT - SUBJECTIVE AND OBJECTIVE BOX
OVERNIGHT EVENTS:    SUBJECTIVE / INTERVAL HPI: Patient seen and examined at bedside.     VITAL SIGNS:  Vital Signs Last 24 Hrs  T(C): 37.1 (14 Nov 2021 01:00), Max: 37.3 (13 Nov 2021 17:40)  T(F): 98.7 (14 Nov 2021 01:00), Max: 99.1 (13 Nov 2021 17:40)  HR: 77 (14 Nov 2021 06:18) (75 - 97)  BP: 97/58 (14 Nov 2021 06:00) (97/58 - 141/71)  BP(mean): 72 (14 Nov 2021 06:00) (72 - 98)  RR: 18 (14 Nov 2021 06:18) (13 - 36)  SpO2: 96% (14 Nov 2021 06:18) (87% - 97%)    PHYSICAL EXAM:    General: WDWN  HEENT: NC/AT; PERRL, anicteric sclera; MMM  Neck: supple  Cardiovascular: +S1/S2, RRR  Respiratory: CTA B/L; no W/R/R  Gastrointestinal: soft, NT/ND; +BSx4  Extremities: WWP; no edema, clubbing or cyanosis  Vascular: 2+ radial, DP/PT pulses B/L  Neurological: AAOx3; no focal deficits    MEDICATIONS:  MEDICATIONS  (STANDING):  amLODIPine   Tablet 10 milliGRAM(s) Oral every 24 hours  chlorhexidine 2% Cloths 1 Application(s) Topical <User Schedule>  enoxaparin Injectable 80 milliGRAM(s) SubCutaneous every 12 hours  insulin lispro (ADMELOG) corrective regimen sliding scale   SubCutaneous Before meals and at bedtime  losartan 50 milliGRAM(s) Oral daily  methylPREDNISolone sodium succinate Injectable 40 milliGRAM(s) IV Push every 12 hours  mirtazapine 30 milliGRAM(s) Oral at bedtime  multivitamin 1 Tablet(s) Oral daily  pantoprazole    Tablet 40 milliGRAM(s) Oral before breakfast  polyethylene glycol 3350 17 Gram(s) Oral every 12 hours  senna 2 Tablet(s) Oral at bedtime  tamsulosin 0.4 milliGRAM(s) Oral at bedtime  trimethoprim  160 mG/sulfamethoxazole 800 mG 1 Tablet(s) Oral every 12 hours    MEDICATIONS  (PRN):  acetaminophen     Tablet .. 650 milliGRAM(s) Oral every 6 hours PRN Temp greater or equal to 38C (100.4F), Mild Pain (1 - 3)      ALLERGIES:  Allergies    Cipro (Rash)    Intolerances    Cipro (Other)      LABS:                        15.1   19.03 )-----------( 270      ( 14 Nov 2021 04:58 )             44.6     11-14    132<L>  |  102  |  32<H>  ----------------------------<  152<H>  5.1   |  24  |  0.77    Ca    9.9      14 Nov 2021 04:58  Phos  4.0     11-14  Mg     2.4     11-14    TPro  6.5  /  Alb  2.9<L>  /  TBili  0.6  /  DBili  x   /  AST  31  /  ALT  29  /  AlkPhos  76  11-14        CAPILLARY BLOOD GLUCOSE      POCT Blood Glucose.: 123 mg/dL (14 Nov 2021 06:18)      RADIOLOGY & ADDITIONAL TESTS: Reviewed. OVERNIGHT EVENTS: Zyprexa 5mg for agitation and death idea, wanted to go downstrain and go to the street to die. [facility] reordered. After Zyprexa still agitated, hit one nurse and tried to came out of bed. on 1 to 1. , 5mg haldol IV push given.  in AM.  SBP 97, BP meds pushed back to later     SUBJECTIVE / INTERVAL HPI: Patient seen and examined at bedside. He was resting comfortably and did not appear in acute distress. He denied any chest pain, SOB, n/v, Abdominal pain, diarrhea, dysuria, numbness or weakness.     VITAL SIGNS:  Vital Signs Last 24 Hrs  T(C): 37.1 (14 Nov 2021 01:00), Max: 37.3 (13 Nov 2021 17:40)  T(F): 98.7 (14 Nov 2021 01:00), Max: 99.1 (13 Nov 2021 17:40)  HR: 77 (14 Nov 2021 06:18) (75 - 97)  BP: 97/58 (14 Nov 2021 06:00) (97/58 - 141/71)  BP(mean): 72 (14 Nov 2021 06:00) (72 - 98)  RR: 18 (14 Nov 2021 06:18) (13 - 36)  SpO2: 96% (14 Nov 2021 06:18) (87% - 97%)    PHYSICAL EXAM:  GENERAL: resting comfortably and did not appear in acute distress. Breathing via 6L NC  HEENT: Normocephalic;  conjunctivae and sclerae clear; dry mucous membranes   NECK : supple  CHEST/LUNG: minimal crackles bilaterally  HEART: S1 S2  regular; no murmurs, gallops or rubs  ABDOMEN: Soft, Nontender, Nondistended; Bowel sounds present  EXTREMITIES: no cyanosis; no calf tenderness, trace LE edema  SKIN: warm and dry  NERVOUS SYSTEM: no focal neurological deficit. CN II-XII grossly intact,     MEDICATIONS:  MEDICATIONS  (STANDING):  amLODIPine   Tablet 10 milliGRAM(s) Oral every 24 hours  chlorhexidine 2% Cloths 1 Application(s) Topical <User Schedule>  enoxaparin Injectable 80 milliGRAM(s) SubCutaneous every 12 hours  insulin lispro (ADMELOG) corrective regimen sliding scale   SubCutaneous Before meals and at bedtime  losartan 50 milliGRAM(s) Oral daily  methylPREDNISolone sodium succinate Injectable 40 milliGRAM(s) IV Push every 12 hours  mirtazapine 30 milliGRAM(s) Oral at bedtime  multivitamin 1 Tablet(s) Oral daily  pantoprazole    Tablet 40 milliGRAM(s) Oral before breakfast  polyethylene glycol 3350 17 Gram(s) Oral every 12 hours  senna 2 Tablet(s) Oral at bedtime  tamsulosin 0.4 milliGRAM(s) Oral at bedtime  trimethoprim  160 mG/sulfamethoxazole 800 mG 1 Tablet(s) Oral every 12 hours    MEDICATIONS  (PRN):  acetaminophen     Tablet .. 650 milliGRAM(s) Oral every 6 hours PRN Temp greater or equal to 38C (100.4F), Mild Pain (1 - 3)      ALLERGIES:  Allergies    Cipro (Rash)    Intolerances    Cipro (Other)      LABS:                        15.1   19.03 )-----------( 270      ( 14 Nov 2021 04:58 )             44.6     11-14    132<L>  |  102  |  32<H>  ----------------------------<  152<H>  5.1   |  24  |  0.77    Ca    9.9      14 Nov 2021 04:58  Phos  4.0     11-14  Mg     2.4     11-14    TPro  6.5  /  Alb  2.9<L>  /  TBili  0.6  /  DBili  x   /  AST  31  /  ALT  29  /  AlkPhos  76  11-14        CAPILLARY BLOOD GLUCOSE      POCT Blood Glucose.: 123 mg/dL (14 Nov 2021 06:18)      RADIOLOGY & ADDITIONAL TESTS: Reviewed. HOSPITAL COURSE: 71 year old male with PMx of HTN, depression, chronic back pain who was transferred from [facility], where he was admitted on 11/2 after an unwitnessed fall (down for >1 day.) He was found to have acute hypoxic respiratory failure secondary to COVID-19 pneumonia, altered mental status and rhabdomyolyis. Pt unable to recollect events or provide any medical history. Prior to this hospitalization he liveds at home with his wife and ambulated independently. He is not vaccinated for COVID. Patient complained of generalized malaise, but he denied any other pertinent symptoms including shortness breath and chest pain. Pt started having increased work of breathing and O2 requirements on 11/6 and he was subsequently transferred to MICU for further management. In the MICU, he was placed on BiPAP and had to be straight catheterized multiple times due to urine retention. Urine culture was positive for citrobacter, he was started on ceftriaxone switch to bactrim and a foleys was placed in. He was eventually weaned off to HFNC with intermittent cPAP. He was further weaned to NC 3L and was saturating 93%. However, on 11/10 he started desating to around 85%, was placed on CPAP and improved to 94%. A CTPE showed  Acute PE segmental branches right upper lobe posterior segment and right lower lobe posterior basal segment. He was started on full dose anticoag but he failed TOV multiple times. He currently has foleys in, the HFNC has been de escalated to NC to 6 L and he is sating at 92% stable to be stepped down to 7 LACH.    OVERNIGHT EVENTS: Zyprexa 5mg for agitation and death idea, wanted to go downstrain and go to the street to die. [facility] reordered. After Zyprexa still agitated, hit one nurse and tried to came out of bed. on 1 to 1. , 5mg haldol IV push given.  in AM.  SBP 97, BP meds pushed back to later     SUBJECTIVE / INTERVAL HPI: Patient seen and examined at bedside. He was resting comfortably and did not appear in acute distress. He denied any chest pain, SOB, n/v, Abdominal pain, diarrhea, dysuria, numbness or weakness.     VITAL SIGNS:  Vital Signs Last 24 Hrs  T(C): 37.1 (14 Nov 2021 01:00), Max: 37.3 (13 Nov 2021 17:40)  T(F): 98.7 (14 Nov 2021 01:00), Max: 99.1 (13 Nov 2021 17:40)  HR: 77 (14 Nov 2021 06:18) (75 - 97)  BP: 97/58 (14 Nov 2021 06:00) (97/58 - 141/71)  BP(mean): 72 (14 Nov 2021 06:00) (72 - 98)  RR: 18 (14 Nov 2021 06:18) (13 - 36)  SpO2: 96% (14 Nov 2021 06:18) (87% - 97%)    PHYSICAL EXAM:  GENERAL: resting comfortably and did not appear in acute distress. Breathing via 6L NC  HEENT: Normocephalic;  conjunctivae and sclerae clear; dry mucous membranes   NECK : supple  CHEST/LUNG: minimal crackles bilaterally  HEART: S1 S2  regular; no murmurs, gallops or rubs  ABDOMEN: Soft, Nontender, Nondistended; Bowel sounds present  EXTREMITIES: no cyanosis; no calf tenderness, trace LE edema  SKIN: warm and dry  NERVOUS SYSTEM: no focal neurological deficit. CN II-XII grossly intact,     MEDICATIONS:  MEDICATIONS  (STANDING):  amLODIPine   Tablet 10 milliGRAM(s) Oral every 24 hours  chlorhexidine 2% Cloths 1 Application(s) Topical <User Schedule>  enoxaparin Injectable 80 milliGRAM(s) SubCutaneous every 12 hours  insulin lispro (ADMELOG) corrective regimen sliding scale   SubCutaneous Before meals and at bedtime  losartan 50 milliGRAM(s) Oral daily  methylPREDNISolone sodium succinate Injectable 40 milliGRAM(s) IV Push every 12 hours  mirtazapine 30 milliGRAM(s) Oral at bedtime  multivitamin 1 Tablet(s) Oral daily  pantoprazole    Tablet 40 milliGRAM(s) Oral before breakfast  polyethylene glycol 3350 17 Gram(s) Oral every 12 hours  senna 2 Tablet(s) Oral at bedtime  tamsulosin 0.4 milliGRAM(s) Oral at bedtime  trimethoprim  160 mG/sulfamethoxazole 800 mG 1 Tablet(s) Oral every 12 hours    MEDICATIONS  (PRN):  acetaminophen     Tablet .. 650 milliGRAM(s) Oral every 6 hours PRN Temp greater or equal to 38C (100.4F), Mild Pain (1 - 3)      ALLERGIES:  Allergies    Cipro (Rash)    Intolerances    Cipro (Other)      LABS:                        15.1   19.03 )-----------( 270      ( 14 Nov 2021 04:58 )             44.6     11-14    132<L>  |  102  |  32<H>  ----------------------------<  152<H>  5.1   |  24  |  0.77    Ca    9.9      14 Nov 2021 04:58  Phos  4.0     11-14  Mg     2.4     11-14    TPro  6.5  /  Alb  2.9<L>  /  TBili  0.6  /  DBili  x   /  AST  31  /  ALT  29  /  AlkPhos  76  11-14        CAPILLARY BLOOD GLUCOSE      POCT Blood Glucose.: 123 mg/dL (14 Nov 2021 06:18)      RADIOLOGY & ADDITIONAL TESTS: Reviewed.

## 2021-11-14 NOTE — SWALLOW BEDSIDE ASSESSMENT ADULT - ORAL PHASE
Reduced and prolonged bolus processing, effortful transport./Decreased anterior-posterior movement of the bolus

## 2021-11-14 NOTE — PROGRESS NOTE ADULT - SUBJECTIVE AND OBJECTIVE BOX
TRANSFER NOTE: ACCEPTANCE FROM MICU TO [facility]: 71 year old male with PMx of HTN, depression, chronic back pain who was transferred from [facility], where he was admitted on 11/2 after an unwitnessed fall (down for >1 day.) He was found to have acute hypoxic respiratory failure secondary to COVID-19 pneumonia, altered mental status and rhabdomyolyis. Pt unable to recollect events or provide any medical history. Prior to this hospitalization he liveds at home with his wife and ambulated independently. He is not vaccinated for COVID. Patient complained of generalized malaise, but he denied any other pertinent symptoms including shortness breath and chest pain. Pt started having increased work of breathing and O2 requirements on 11/6 and he was subsequently transferred to MICU for further management. In the MICU, he was placed on BiPAP and had to be straight catheterized multiple times due to urine retention. Urine culture was positive for citrobacter, he was started on ceftriaxone switch to bactrim and a foleys was placed in. He was eventually weaned off to HFNC with intermittent cPAP. He was further weaned to NC 3L and was saturating 93%. However, on 11/10 he started desating to around 85%, was placed on CPAP and improved to 94%. A CTPE showed  Acute PE segmental branches right upper lobe posterior segment and right lower lobe posterior basal segment. He was started on full dose anticoag but he failed TOV multiple times. He currently has foleys in, the HFNC has been de escalated to NC to 6 L and he is sating at 92% stable to be stepped down to 7 LACH.    OVERNIGHT EVENTS: Zyprexa 5mg for agitation and suicidal ideation. Virgen reordered. After Zyprexa still agitated, hit one nurse and tried to came out of bed. on 1 to 1. , 5mg haldol IV push given.  in AM.  SBP 97, BP meds pushed back to later     SUBJECTIVE / INTERVAL HPI: Patient seen and examined at bedside. He was resting comfortably and did not appear in acute distress. He denied any chest pain, SOB, n/v, Abdominal pain, diarrhea, dysuria, numbness or weakness.      Denies Fever/Chills, HA, CP, SOB, n/v, changes in bowel/urinary habits.  12pt ROS otherwise negative.    VITALS  Vital Signs Last 24 Hrs  T(C): 36.6 (14 Nov 2021 16:00), Max: 37.2 (13 Nov 2021 21:58)  T(F): 97.8 (14 Nov 2021 16:00), Max: 99 (13 Nov 2021 21:58)  HR: 98 (14 Nov 2021 19:26) (74 - 100)  BP: 129/82 (14 Nov 2021 19:26) (97/58 - 129/82)  BP(mean): 96 (14 Nov 2021 19:00) (72 - 96)  RR: 20 (14 Nov 2021 19:26) (12 - 36)  SpO2: 98% (14 Nov 2021 19:00) (87% - 98%)    CAPILLARY BLOOD GLUCOSE      POCT Blood Glucose.: 169 mg/dL (14 Nov 2021 11:57)  POCT Blood Glucose.: 123 mg/dL (14 Nov 2021 06:18)  POCT Blood Glucose.: 122 mg/dL (13 Nov 2021 21:19)      PHYSICAL EXAM  General: NAD  Head: NC/AT; MMM; PERRL; EOMI;  Neck: Supple; no JVD  Respiratory: CTAB; no wheezes/rales/rhonchi  Cardiovascular: Regular rhythm/rate; S1/S2+, no murmurs, rubs gallops   Gastrointestinal: Soft; NTND; bowel sounds normal and present  Extremities: WWP; no edema/cyanosis  Neurological: A&Ox3, CNII-XII grossly intact; no obvious focal deficits    MEDICATIONS  (STANDING):  amLODIPine   Tablet 10 milliGRAM(s) Oral every 24 hours  chlorhexidine 2% Cloths 1 Application(s) Topical <User Schedule>  enoxaparin Injectable 80 milliGRAM(s) SubCutaneous every 12 hours  insulin lispro (ADMELOG) corrective regimen sliding scale   SubCutaneous Before meals and at bedtime  losartan 50 milliGRAM(s) Oral daily  methylPREDNISolone sodium succinate Injectable 30 milliGRAM(s) IV Push every 12 hours  mirtazapine 30 milliGRAM(s) Oral at bedtime  multivitamin 1 Tablet(s) Oral daily  pantoprazole    Tablet 40 milliGRAM(s) Oral before breakfast  polyethylene glycol 3350 17 Gram(s) Oral every 12 hours  senna 2 Tablet(s) Oral at bedtime  tamsulosin 0.4 milliGRAM(s) Oral at bedtime  trimethoprim  160 mG/sulfamethoxazole 800 mG 1 Tablet(s) Oral every 12 hours    MEDICATIONS  (PRN):  acetaminophen     Tablet .. 650 milliGRAM(s) Oral every 6 hours PRN Temp greater or equal to 38C (100.4F), Mild Pain (1 - 3)      Cipro (Rash)  Cipro (Other)      LABS                        15.1   19.03 )-----------( 270      ( 14 Nov 2021 04:58 )             44.6     11-14    132<L>  |  102  |  32<H>  ----------------------------<  152<H>  5.1   |  24  |  0.77    Ca    9.9      14 Nov 2021 04:58  Phos  4.0     11-14  Mg     2.4     11-14    TPro  6.5  /  Alb  2.9<L>  /  TBili  0.6  /  DBili  x   /  AST  31  /  ALT  29  /  AlkPhos  76  11-14                IMAGING/EKG/ETC       TRANSFER NOTE: ACCEPTANCE FROM [facility]U TO [facility]: 71 year old male with PMx of HTN, depression, chronic back pain who was transferred from [facility], where he was admitted on 11/2 after an unwitnessed fall (down for >1 day.) He was found to have acute hypoxic respiratory failure secondary to COVID-19 pneumonia, altered mental status and rhabdomyolyis. Pt unable to recollect events or provide any medical history. Prior to this hospitalization he liveds at home with his wife and ambulated independently. He is not vaccinated for COVID. Patient complained of generalized malaise, but he denied any other pertinent symptoms including shortness breath and chest pain. Pt started having increased work of breathing and O2 requirements on 11/6 and he was subsequently transferred to MICU for further management. In the MICU, he was placed on BiPAP and had to be straight catheterized multiple times due to urine retention. Urine culture was positive for citrobacter, he was started on ceftriaxone switch to bactrim and a foleys was placed in. He was eventually weaned off to HFNC with intermittent cPAP. He was further weaned to NC 3L and was saturating 93%. However, on 11/10 he started desating to around 85%, was placed on CPAP and improved to 94%. A CTPE showed  Acute PE segmental branches right upper lobe posterior segment and right lower lobe posterior basal segment. He was started on full dose anticoag but he failed TOV multiple times. He currently has foleys in, the HFNC has been de escalated to NC to 6 L and he is sating at 92% stable to be stepped down to 7 L[facility].    OVERNIGHT EVENTS: Zyprexa 5mg for agitation and suicidal ideation. Virgen reordered. After Zyprexa still agitated, hit one nurse and tried to came out of bed. on 1 to 1. , 5mg haldol IV push given.  in AM.  SBP 97, BP meds pushed back to later     SUBJECTIVE / INTERVAL HPI: Patient seen and examined at bedside. He was resting comfortably and did not appear in acute distress. He denied any chest pain, SOB, n/v, Abdominal pain, diarrhea, dysuria, numbness or weakness.      Denies Fever/Chills, HA, CP, SOB, n/v, changes in bowel/urinary habits.  12pt ROS otherwise negative.    VITALS  Vital Signs Last 24 Hrs  T(C): 36.6 (14 Nov 2021 16:00), Max: 37.2 (13 Nov 2021 21:58)  T(F): 97.8 (14 Nov 2021 16:00), Max: 99 (13 Nov 2021 21:58)  HR: 98 (14 Nov 2021 19:26) (74 - 100)  BP: 129/82 (14 Nov 2021 19:26) (97/58 - 129/82)  BP(mean): 96 (14 Nov 2021 19:00) (72 - 96)  RR: 20 (14 Nov 2021 19:26) (12 - 36)  SpO2: 98% (14 Nov 2021 19:00) (87% - 98%)    CAPILLARY BLOOD GLUCOSE      POCT Blood Glucose.: 169 mg/dL (14 Nov 2021 11:57)  POCT Blood Glucose.: 123 mg/dL (14 Nov 2021 06:18)  POCT Blood Glucose.: 122 mg/dL (13 Nov 2021 21:19)      PHYSICAL EXAM  GENERAL: resting comfortably and did not appear in acute distress. Breathing via 6L NC  HEENT: Normocephalic;  conjunctivae and sclerae clear; dry mucous membranes   NECK : supple  CHEST/LUNG: minimal crackles bilaterally  HEART: S1 S2  regular; no murmurs, gallops or rubs  ABDOMEN: Soft, Nontender, Nondistended; Bowel sounds present  EXTREMITIES: no cyanosis; no calf tenderness, trace LE edema  SKIN: warm and dry  NERVOUS SYSTEM: no focal neurological deficit. CN II-XII grossly intact,    MEDICATIONS  (STANDING):  amLODIPine   Tablet 10 milliGRAM(s) Oral every 24 hours  chlorhexidine 2% Cloths 1 Application(s) Topical <User Schedule>  enoxaparin Injectable 80 milliGRAM(s) SubCutaneous every 12 hours  insulin lispro (ADMELOG) corrective regimen sliding scale   SubCutaneous Before meals and at bedtime  losartan 50 milliGRAM(s) Oral daily  methylPREDNISolone sodium succinate Injectable 30 milliGRAM(s) IV Push every 12 hours  mirtazapine 30 milliGRAM(s) Oral at bedtime  multivitamin 1 Tablet(s) Oral daily  pantoprazole    Tablet 40 milliGRAM(s) Oral before breakfast  polyethylene glycol 3350 17 Gram(s) Oral every 12 hours  senna 2 Tablet(s) Oral at bedtime  tamsulosin 0.4 milliGRAM(s) Oral at bedtime  trimethoprim  160 mG/sulfamethoxazole 800 mG 1 Tablet(s) Oral every 12 hours    MEDICATIONS  (PRN):  acetaminophen     Tablet .. 650 milliGRAM(s) Oral every 6 hours PRN Temp greater or equal to 38C (100.4F), Mild Pain (1 - 3)      Cipro (Rash)  Cipro (Other)      LABS                        15.1   19.03 )-----------( 270      ( 14 Nov 2021 04:58 )             44.6     11-14    132<L>  |  102  |  32<H>  ----------------------------<  152<H>  5.1   |  24  |  0.77    Ca    9.9      14 Nov 2021 04:58  Phos  4.0     11-14  Mg     2.4     11-14    TPro  6.5  /  Alb  2.9<L>  /  TBili  0.6  /  DBili  x   /  AST  31  /  ALT  29  /  AlkPhos  76  11-14                IMAGING/EKG/ETC

## 2021-11-14 NOTE — SWALLOW BEDSIDE ASSESSMENT ADULT - SWALLOW EVAL: DIAGNOSIS
Pt presented with mild oral deficits and suspect pharyngeal dysphagia, likely in setting of respiratory insufficiency, confounded by lethargy and AMS, with overt signs of aspiration with thin liquids. Recs for modified oral diet as tolerated with strict aspiration precautions. Will follow-up to assess tolerance.

## 2021-11-14 NOTE — PROGRESS NOTE ADULT - ASSESSMENT
71 year old male with PMx of HTN, depression, chronic back pain who was transferred from [facility], where he was admitted on 11/2 after an unwitnessed fall (down for >1 day) and found to have acute hypoxic respiratory failure secondary to COVID-19, altered mental status and rhabdomyolysis. Due to increased work of breathing and oxygen requirements patient was upgraded to MICU for closer monitoring. Patient on HFNC, WOB has improved. Urine cultures positive for citrobacter koseri for which he is on Bactrim DS BID. Continue to wean oxygen requirements as tolerated.

## 2021-11-14 NOTE — PROGRESS NOTE ADULT - PROBLEM SELECTOR PLAN 2
- COVID-19 Pneumonia: CXR with bilateral hazy opacities c/w viral COVID PNA  - not a candidate for Tocilizumab, no indication for antibiotics at this time, procalcitonin negative  - s/p Remdesivir    - D dimer uptrending from 2655 to 3549 and then down to 2844 on 11/11  - CTPE on 11/10: Acute PE segmental branches right upper lobe posterior segment and right lower lobe posterior basal segment. Extensive bilateral groundglass lung opacities with scattered patchy consolidations.  - CXR on 11/11  Bilateral asymmetrical opacities/left greater than right, stable.   - s/p decadron 10 mg IV for 10 days  - 2/2 PE and non IPF interstitial lung disease + hypersensitivity pneumonitis  -- cw HFNC with a goal SpO2 of 88-90% - continue to wean oxygen as tolerated, currently on 40/50    To do:  - cw HFNC alternate with NC with a goal SpO2 of 88-90%  - cw solumedrol 30 mg IV BID  - cw full anticoagulation with lovenox 80 mg BID IV    #PE  -per CT chest on 11/10 Acute PE right lower lobe posterior basal segmental arteries and right upper lobe posterior segment pulmonary artery. acute pneumonitis  -continue on full AC with Lovenox 80mg BID IV  -continue to wean HFNC requirements with NC with a goal SpO2 of 88-90% - COVID-19 Pneumonia: CXR with bilateral hazy opacities c/w viral COVID PNA  - not a candidate for Tocilizumab, no indication for antibiotics at this time, procalcitonin negative  - s/p Remdesivir    - D dimer uptrending from 2655 to 3549 and then down to 2844 on 11/11  - CTPE on 11/10: Acute PE segmental branches right upper lobe posterior segment and right lower lobe posterior basal segment. Extensive bilateral groundglass lung opacities with scattered patchy consolidations.  - CXR on 11/11  Bilateral asymmetrical opacities/left greater than right, stable.   - s/p decadron 10 mg IV for 10 days  - 2/2 PE and non IPF interstitial lung disease + hypersensitivity pneumonitis    To do:  - continue NC 6 L and wean when possible   - cw solumedrol 30 mg IV BID  - cw full anticoagulation with lovenox 80 mg BID IV

## 2021-11-15 DIAGNOSIS — E87.1 HYPO-OSMOLALITY AND HYPONATREMIA: ICD-10-CM

## 2021-11-15 DIAGNOSIS — I26.99 OTHER PULMONARY EMBOLISM WITHOUT ACUTE COR PULMONALE: ICD-10-CM

## 2021-11-15 LAB
ANION GAP SERPL CALC-SCNC: 10 MMOL/L — SIGNIFICANT CHANGE UP (ref 5–17)
ANION GAP SERPL CALC-SCNC: 7 MMOL/L — SIGNIFICANT CHANGE UP (ref 5–17)
BUN SERPL-MCNC: 26 MG/DL — HIGH (ref 7–23)
BUN SERPL-MCNC: 27 MG/DL — HIGH (ref 7–23)
CALCIUM SERPL-MCNC: 10 MG/DL — SIGNIFICANT CHANGE UP (ref 8.4–10.5)
CALCIUM SERPL-MCNC: 9.8 MG/DL — SIGNIFICANT CHANGE UP (ref 8.4–10.5)
CHLORIDE SERPL-SCNC: 100 MMOL/L — SIGNIFICANT CHANGE UP (ref 96–108)
CHLORIDE SERPL-SCNC: 99 MMOL/L — SIGNIFICANT CHANGE UP (ref 96–108)
CO2 SERPL-SCNC: 20 MMOL/L — LOW (ref 22–31)
CO2 SERPL-SCNC: 25 MMOL/L — SIGNIFICANT CHANGE UP (ref 22–31)
CREAT SERPL-MCNC: 0.77 MG/DL — SIGNIFICANT CHANGE UP (ref 0.5–1.3)
CREAT SERPL-MCNC: 0.78 MG/DL — SIGNIFICANT CHANGE UP (ref 0.5–1.3)
GLUCOSE BLDC GLUCOMTR-MCNC: 115 MG/DL — HIGH (ref 70–99)
GLUCOSE BLDC GLUCOMTR-MCNC: 120 MG/DL — HIGH (ref 70–99)
GLUCOSE BLDC GLUCOMTR-MCNC: 159 MG/DL — HIGH (ref 70–99)
GLUCOSE BLDC GLUCOMTR-MCNC: 177 MG/DL — HIGH (ref 70–99)
GLUCOSE SERPL-MCNC: 104 MG/DL — HIGH (ref 70–99)
GLUCOSE SERPL-MCNC: 120 MG/DL — HIGH (ref 70–99)
HCT VFR BLD CALC: 50.4 % — HIGH (ref 39–50)
HGB BLD-MCNC: 16.5 G/DL — SIGNIFICANT CHANGE UP (ref 13–17)
MAGNESIUM SERPL-MCNC: 2.3 MG/DL — SIGNIFICANT CHANGE UP (ref 1.6–2.6)
MCHC RBC-ENTMCNC: 30 PG — SIGNIFICANT CHANGE UP (ref 27–34)
MCHC RBC-ENTMCNC: 32.7 GM/DL — SIGNIFICANT CHANGE UP (ref 32–36)
MCV RBC AUTO: 91.6 FL — SIGNIFICANT CHANGE UP (ref 80–100)
NRBC # BLD: 0 /100 WBCS — SIGNIFICANT CHANGE UP (ref 0–0)
OSMOLALITY SERPL: 282 MOSM/KG — SIGNIFICANT CHANGE UP (ref 280–301)
PHOSPHATE SERPL-MCNC: 2.7 MG/DL — SIGNIFICANT CHANGE UP (ref 2.5–4.5)
PLATELET # BLD AUTO: 266 K/UL — SIGNIFICANT CHANGE UP (ref 150–400)
POTASSIUM SERPL-MCNC: 4.7 MMOL/L — SIGNIFICANT CHANGE UP (ref 3.5–5.3)
POTASSIUM SERPL-MCNC: SIGNIFICANT CHANGE UP (ref 3.5–5.3)
POTASSIUM SERPL-SCNC: 4.7 MMOL/L — SIGNIFICANT CHANGE UP (ref 3.5–5.3)
POTASSIUM SERPL-SCNC: SIGNIFICANT CHANGE UP (ref 3.5–5.3)
RBC # BLD: 5.5 M/UL — SIGNIFICANT CHANGE UP (ref 4.2–5.8)
RBC # FLD: 13.5 % — SIGNIFICANT CHANGE UP (ref 10.3–14.5)
SODIUM SERPL-SCNC: 130 MMOL/L — LOW (ref 135–145)
SODIUM SERPL-SCNC: 131 MMOL/L — LOW (ref 135–145)
WBC # BLD: 16.75 K/UL — HIGH (ref 3.8–10.5)
WBC # FLD AUTO: 16.75 K/UL — HIGH (ref 3.8–10.5)

## 2021-11-15 PROCEDURE — 99233 SBSQ HOSP IP/OBS HIGH 50: CPT

## 2021-11-15 PROCEDURE — 99233 SBSQ HOSP IP/OBS HIGH 50: CPT | Mod: GC

## 2021-11-15 RX ORDER — APIXABAN 2.5 MG/1
5 TABLET, FILM COATED ORAL EVERY 12 HOURS
Refills: 0 | Status: DISCONTINUED | OUTPATIENT
Start: 2021-11-17 | End: 2021-11-17

## 2021-11-15 RX ORDER — APIXABAN 2.5 MG/1
10 TABLET, FILM COATED ORAL EVERY 12 HOURS
Refills: 0 | Status: COMPLETED | OUTPATIENT
Start: 2021-11-15 | End: 2021-11-16

## 2021-11-15 RX ORDER — APIXABAN 2.5 MG/1
5 TABLET, FILM COATED ORAL EVERY 12 HOURS
Refills: 0 | Status: DISCONTINUED | OUTPATIENT
Start: 2021-11-15 | End: 2021-11-15

## 2021-11-15 RX ADMIN — Medication 30 MILLIGRAM(S): at 06:51

## 2021-11-15 RX ADMIN — SENNA PLUS 2 TABLET(S): 8.6 TABLET ORAL at 22:14

## 2021-11-15 RX ADMIN — Medication 40 MILLIGRAM(S): at 17:44

## 2021-11-15 RX ADMIN — Medication 650 MILLIGRAM(S): at 20:33

## 2021-11-15 RX ADMIN — POLYETHYLENE GLYCOL 3350 17 GRAM(S): 17 POWDER, FOR SOLUTION ORAL at 06:51

## 2021-11-15 RX ADMIN — Medication 1: at 22:13

## 2021-11-15 RX ADMIN — AMLODIPINE BESYLATE 10 MILLIGRAM(S): 2.5 TABLET ORAL at 06:50

## 2021-11-15 RX ADMIN — Medication 1: at 11:32

## 2021-11-15 RX ADMIN — Medication 1 TABLET(S): at 11:31

## 2021-11-15 RX ADMIN — APIXABAN 10 MILLIGRAM(S): 2.5 TABLET, FILM COATED ORAL at 11:31

## 2021-11-15 RX ADMIN — TAMSULOSIN HYDROCHLORIDE 0.4 MILLIGRAM(S): 0.4 CAPSULE ORAL at 22:14

## 2021-11-15 RX ADMIN — PANTOPRAZOLE SODIUM 40 MILLIGRAM(S): 20 TABLET, DELAYED RELEASE ORAL at 06:51

## 2021-11-15 RX ADMIN — CHLORHEXIDINE GLUCONATE 1 APPLICATION(S): 213 SOLUTION TOPICAL at 06:30

## 2021-11-15 RX ADMIN — POLYETHYLENE GLYCOL 3350 17 GRAM(S): 17 POWDER, FOR SOLUTION ORAL at 17:44

## 2021-11-15 RX ADMIN — Medication 1 TABLET(S): at 11:32

## 2021-11-15 RX ADMIN — LOSARTAN POTASSIUM 50 MILLIGRAM(S): 100 TABLET, FILM COATED ORAL at 11:31

## 2021-11-15 NOTE — PROGRESS NOTE ADULT - SUBJECTIVE AND OBJECTIVE BOX
***TRANSFER FROM [facility] to [facility]***    HOSPITAL COURSE: 71 year old male with PMx of HTN, depression, chronic back pain who was transferred from [facility], where he was admitted on 11/2 after an unwitnessed fall (down for >1 day), on arrival at [facility], patient found to have acute hypoxic respiratory failure secondary to COVID-19 pneumonia (not vaccinated for COVID), altered mental status, and rhabdomyolyis. Patient was started on Remdesivir (completed 11/8) and decadron (10/3-10/10).  Pt started having increased work of breathing and O2 requirements on 11/6 and he was subsequently transferred to MICU for further management. In the MICU, he was placed on BiPAP and had to be straight catheterized multiple times due to urine retention. Urine culture was positive for citrobacter, he was started on ceftriaxone switch to bactrim and a posey was placed in. He was eventually weaned off to HFNC with intermittent cPAP. He was further weaned to NC 3L and was saturating 93%. However, on 11/10 he started desaturating to around 85%, was placed on CPAP and improved to 94%. A CTPE showed  Acute PE segmental branches right upper lobe posterior segment and right lower lobe posterior basal segment. He was started on full dose anticoagulation but he failed TOV multiple times. In the ICU patient was started on 30mg Methylprednisolone, now transitioned to 40mg Prednisone. He currently has posey in, the HFNC has been de escalated to NC to 6 L and he is sating at 92% stable to be stepped down to F.      SUBJECTIVE / INTERVAL HPI: Patient seen and examined at bedside.     ROS: negative unless otherwise stated above.    VITAL SIGNS:  Vital Signs Last 24 Hrs  T(C): 36.5 (15 Nov 2021 14:00), Max: 36.7 (15 Nov 2021 05:30)  T(F): 97.7 (15 Nov 2021 14:00), Max: 98 (15 Nov 2021 05:30)  HR: 94 (15 Nov 2021 11:40) (52 - 100)  BP: 110/73 (15 Nov 2021 11:40) (107/64 - 149/77)  BP(mean): 86 (15 Nov 2021 11:40) (79 - 107)  RR: 18 (15 Nov 2021 11:40) (12 - 21)  SpO2: 95% (15 Nov 2021 11:40) (92% - 100%)      11-14-21 @ 07:01  -  11-15-21 @ 07:00  --------------------------------------------------------  IN: 0 mL / OUT: 1770 mL / NET: -1770 mL    11-15-21 @ 07:01  -  11-15-21 @ 14:09  --------------------------------------------------------  IN: 0 mL / OUT: 325 mL / NET: -325 mL        PHYSICAL EXAM:  incomplete  General: NAD  HEENT: MMM  Neck: supple  Cardiovascular: +S1/S2; RRR  Respiratory: CTA B/L; no W/R/R  Gastrointestinal: soft, NT/ND  Extremities: WWP; no edema, clubbing or cyanosis  Vascular: 2+ radial, DP/PT pulses B/L  Neurological: AAOx3; no focal deficits    MEDICATIONS:  MEDICATIONS  (STANDING):  amLODIPine   Tablet 10 milliGRAM(s) Oral every 24 hours  apixaban 10 milliGRAM(s) Oral every 12 hours  chlorhexidine 2% Cloths 1 Application(s) Topical <User Schedule>  insulin lispro (ADMELOG) corrective regimen sliding scale   SubCutaneous Before meals and at bedtime  losartan 50 milliGRAM(s) Oral daily  mirtazapine 30 milliGRAM(s) Oral at bedtime  multivitamin 1 Tablet(s) Oral daily  pantoprazole    Tablet 40 milliGRAM(s) Oral before breakfast  polyethylene glycol 3350 17 Gram(s) Oral every 12 hours  predniSONE   Tablet 40 milliGRAM(s) Oral daily  senna 2 Tablet(s) Oral at bedtime  tamsulosin 0.4 milliGRAM(s) Oral at bedtime    MEDICATIONS  (PRN):  acetaminophen     Tablet .. 650 milliGRAM(s) Oral every 6 hours PRN Temp greater or equal to 38C (100.4F), Mild Pain (1 - 3)      ALLERGIES:  Allergies    Cipro (Rash)    Intolerances    Cipro (Other)      LABS:                        16.5   16.75 )-----------( 266      ( 15 Nov 2021 08:03 )             50.4     11-15    130<L>  |  100  |  26<H>  ----------------------------<  104<H>  See Note   |  20<L>  |  0.78    Ca    9.8      15 Nov 2021 08:03  Phos  2.7     11-15  Mg     2.3     11-15    TPro  6.5  /  Alb  2.9<L>  /  TBili  0.6  /  DBili  x   /  AST  31  /  ALT  29  /  AlkPhos  76  11-14        CAPILLARY BLOOD GLUCOSE      POCT Blood Glucose.: 177 mg/dL (15 Nov 2021 11:16)      RADIOLOGY & ADDITIONAL TESTS: Reviewed. ***TRANSFER FROM [facility] to [facility]***    HOSPITAL COURSE: 71 year old male with PMx of HTN, depression, chronic back pain who was transferred from [facility], where he was admitted on 11/2 after an unwitnessed fall (down for >1 day), on arrival at [facility], patient found to have acute hypoxic respiratory failure secondary to COVID-19 pneumonia (not vaccinated for COVID), altered mental status, and rhabdomyolyis. Patient was started on Remdesivir (completed 11/8) and decadron (10/3-10/10).  Pt started having increased work of breathing and O2 requirements on 11/6 and he was subsequently transferred to MICU for further management. In the MICU, he was placed on BiPAP and had to be straight catheterized multiple times due to urine retention. Urine culture was positive for citrobacter, he was started on ceftriaxone switch to bactrim and a posey was placed in. He was eventually weaned off to HFNC with intermittent cPAP. He was further weaned to NC 3L and was saturating 93%. However, on 11/10 he started desaturating to around 85%, was placed on CPAP and improved to 94%. A CTPE showed  Acute PE segmental branches right upper lobe posterior segment and right lower lobe posterior basal segment. He was started on full dose anticoagulation but he failed TOV multiple times. In the ICU patient was started on 30mg Methylprednisolone, now transitioned to 40mg Prednisone. He currently has posey in, the HFNC has been de escalated to NC to 6 L and he is sating at 92% stable to be stepped down to F.      SUBJECTIVE / INTERVAL HPI: Patient seen and examined at bedside. Patient reports he is taking it one day at a time. He is not sure if he feels better or worse compared to yesterday. Feels some SOB at times. Denies CP, palpitations, abd pain, diarrhea, constipation.     ROS: negative unless otherwise stated above.    VITAL SIGNS:  Vital Signs Last 24 Hrs  T(C): 36.5 (15 Nov 2021 14:00), Max: 36.7 (15 Nov 2021 05:30)  T(F): 97.7 (15 Nov 2021 14:00), Max: 98 (15 Nov 2021 05:30)  HR: 94 (15 Nov 2021 11:40) (52 - 100)  BP: 110/73 (15 Nov 2021 11:40) (107/64 - 149/77)  BP(mean): 86 (15 Nov 2021 11:40) (79 - 107)  RR: 18 (15 Nov 2021 11:40) (12 - 21)  SpO2: 95% (15 Nov 2021 11:40) (92% - 100%)      11-14-21 @ 07:01  -  11-15-21 @ 07:00  --------------------------------------------------------  IN: 0 mL / OUT: 1770 mL / NET: -1770 mL    11-15-21 @ 07:01  -  11-15-21 @ 14:09  --------------------------------------------------------  IN: 0 mL / OUT: 325 mL / NET: -325 mL        PHYSICAL EXAM:    General: NAD, laying in bed  HEENT: MMM, no scleral icterus, no nasal discharge, ASIA (4-->2)  Neck: supple, no thyromegaly  Cardiovascular: RRR, no M/R/G  Respiratory: CTA B/L ant/post; no W/R/R, no acc musc use  Gastrointestinal: soft, NT/ND, no guarding  Uro: posey draining yellow urine, no suprapubic tenderness  Extremities: WWP; no edema, clubbing or cyanosis  Vascular: 2+ radial, carotid pulses B/L  Neurological: AAOx3; CN II-XII intact, UE and LE strength 5/5 b/l  Skin: sweaty; no rash, scar, lesions     MEDICATIONS:  MEDICATIONS  (STANDING):  amLODIPine   Tablet 10 milliGRAM(s) Oral every 24 hours  apixaban 10 milliGRAM(s) Oral every 12 hours  chlorhexidine 2% Cloths 1 Application(s) Topical <User Schedule>  insulin lispro (ADMELOG) corrective regimen sliding scale   SubCutaneous Before meals and at bedtime  losartan 50 milliGRAM(s) Oral daily  mirtazapine 30 milliGRAM(s) Oral at bedtime  multivitamin 1 Tablet(s) Oral daily  pantoprazole    Tablet 40 milliGRAM(s) Oral before breakfast  polyethylene glycol 3350 17 Gram(s) Oral every 12 hours  predniSONE   Tablet 40 milliGRAM(s) Oral daily  senna 2 Tablet(s) Oral at bedtime  tamsulosin 0.4 milliGRAM(s) Oral at bedtime    MEDICATIONS  (PRN):  acetaminophen     Tablet .. 650 milliGRAM(s) Oral every 6 hours PRN Temp greater or equal to 38C (100.4F), Mild Pain (1 - 3)      ALLERGIES:  Allergies    Cipro (Rash)    Intolerances    Cipro (Other)      LABS:                        16.5   16.75 )-----------( 266      ( 15 Nov 2021 08:03 )             50.4     11-15    130<L>  |  100  |  26<H>  ----------------------------<  104<H>  See Note   |  20<L>  |  0.78    Ca    9.8      15 Nov 2021 08:03  Phos  2.7     11-15  Mg     2.3     11-15    TPro  6.5  /  Alb  2.9<L>  /  TBili  0.6  /  DBili  x   /  AST  31  /  ALT  29  /  AlkPhos  76  11-14        CAPILLARY BLOOD GLUCOSE      POCT Blood Glucose.: 177 mg/dL (15 Nov 2021 11:16)      RADIOLOGY & ADDITIONAL TESTS: Reviewed. ***ACCEPTED TRANSFER FROM [facility] to [facility]***    HOSPITAL COURSE: 71 year old male with PMx of HTN, depression, chronic back pain who was transferred from [facility], where he was admitted on 11/2 after an unwitnessed fall (down for >1 day), on arrival at [facility], patient found to have acute hypoxic respiratory failure secondary to COVID-19 pneumonia (not vaccinated for COVID), altered mental status, and rhabdomyolyis. Patient was started on Remdesivir (completed 11/8) and decadron (10/3-10/10).  Pt started having increased work of breathing and O2 requirements on 11/6 and he was subsequently transferred to MICU for further management. In the MICU, he was placed on BiPAP and had to be straight catheterized multiple times due to urine retention. Urine culture was positive for citrobacter, he was started on ceftriaxone switch to bactrim and a posey was placed in. He was eventually weaned off to HFNC with intermittent cPAP. He was further weaned to NC 3L and was saturating 93%. However, on 11/10 he started desaturating to around 85%, was placed on CPAP and improved to 94%. A CTPE showed  Acute PE segmental branches right upper lobe posterior segment and right lower lobe posterior basal segment. He was started on full dose anticoagulation but he failed TOV multiple times. In the ICU patient was started on 30mg Methylprednisolone, now transitioned to 40mg Prednisone. He currently has posey in, the HFNC has been de escalated to NC to 6 L and he is sating at 92% stable to be stepped down to [facility].      SUBJECTIVE / INTERVAL HPI: Patient seen and examined at bedside. Patient reports he is taking it one day at a time. He is not sure if he feels better or worse compared to yesterday. Feels some SOB at times. Denies CP, palpitations, abd pain, diarrhea, constipation.     ROS: negative unless otherwise stated above.    VITAL SIGNS:  Vital Signs Last 24 Hrs  T(C): 36.5 (15 Nov 2021 14:00), Max: 36.7 (15 Nov 2021 05:30)  T(F): 97.7 (15 Nov 2021 14:00), Max: 98 (15 Nov 2021 05:30)  HR: 94 (15 Nov 2021 11:40) (52 - 100)  BP: 110/73 (15 Nov 2021 11:40) (107/64 - 149/77)  BP(mean): 86 (15 Nov 2021 11:40) (79 - 107)  RR: 18 (15 Nov 2021 11:40) (12 - 21)  SpO2: 95% (15 Nov 2021 11:40) (92% - 100%)      11-14-21 @ 07:01  -  11-15-21 @ 07:00  --------------------------------------------------------  IN: 0 mL / OUT: 1770 mL / NET: -1770 mL    11-15-21 @ 07:01  -  11-15-21 @ 14:09  --------------------------------------------------------  IN: 0 mL / OUT: 325 mL / NET: -325 mL        PHYSICAL EXAM:    General: NAD, laying in bed  HEENT: MMM, no scleral icterus, no nasal discharge, ASIA (4-->2)  Neck: supple, no thyromegaly  Cardiovascular: RRR, no M/R/G  Respiratory: CTA B/L ant/post; no W/R/R, no acc musc use  Gastrointestinal: soft, NT/ND, no guarding  Uro: posey draining yellow urine, no suprapubic tenderness  Extremities: WWP; no edema, clubbing or cyanosis  Vascular: 2+ radial, carotid pulses B/L  Neurological: AAOx3; CN II-XII intact, UE and LE strength 5/5 b/l  Skin: sweaty; no rash, scar, lesions     MEDICATIONS:  MEDICATIONS  (STANDING):  amLODIPine   Tablet 10 milliGRAM(s) Oral every 24 hours  apixaban 10 milliGRAM(s) Oral every 12 hours  chlorhexidine 2% Cloths 1 Application(s) Topical <User Schedule>  insulin lispro (ADMELOG) corrective regimen sliding scale   SubCutaneous Before meals and at bedtime  losartan 50 milliGRAM(s) Oral daily  mirtazapine 30 milliGRAM(s) Oral at bedtime  multivitamin 1 Tablet(s) Oral daily  pantoprazole    Tablet 40 milliGRAM(s) Oral before breakfast  polyethylene glycol 3350 17 Gram(s) Oral every 12 hours  predniSONE   Tablet 40 milliGRAM(s) Oral daily  senna 2 Tablet(s) Oral at bedtime  tamsulosin 0.4 milliGRAM(s) Oral at bedtime    MEDICATIONS  (PRN):  acetaminophen     Tablet .. 650 milliGRAM(s) Oral every 6 hours PRN Temp greater or equal to 38C (100.4F), Mild Pain (1 - 3)      ALLERGIES:  Allergies    Cipro (Rash)    Intolerances    Cipro (Other)      LABS:                        16.5   16.75 )-----------( 266      ( 15 Nov 2021 08:03 )             50.4     11-15    130<L>  |  100  |  26<H>  ----------------------------<  104<H>  See Note   |  20<L>  |  0.78    Ca    9.8      15 Nov 2021 08:03  Phos  2.7     11-15  Mg     2.3     11-15    TPro  6.5  /  Alb  2.9<L>  /  TBili  0.6  /  DBili  x   /  AST  31  /  ALT  29  /  AlkPhos  76  11-14        CAPILLARY BLOOD GLUCOSE      POCT Blood Glucose.: 177 mg/dL (15 Nov 2021 11:16)      RADIOLOGY & ADDITIONAL TESTS: Reviewed.

## 2021-11-15 NOTE — PROGRESS NOTE ADULT - ATTENDING SUPERVISION STATEMENT
Resident/Fellow
Resident/Fellow
Resident
Resident/Fellow
Resident
ACP
Resident
ACP/Resident/Fellow
Resident/Fellow
Resident
Resident
ACP/Resident/Fellow

## 2021-11-15 NOTE — PROGRESS NOTE ADULT - ASSESSMENT
Patient is a 70yo M w/ PM of HTN, depression, chronic back pain who was transferred from [facility], where he was admitted on 11/2 after an unwitnessed fall (down for >1 day) and found to have acute hypoxic respiratory failure secondary to COVID-19, altered mental status and rhabdomyolysis. Due to increased work of breathing and oxygen requirements patient was upgraded to MICU for closer monitoring. Patient on HFNC, WOB has improved. Urine cultures positive for citrobacter koseri for which he is on Bactrim DS BID. Continue to wean oxygen requirements as tolerated. Pending stepdown to [facility]. Patient is a 72yo M w/ PM of HTN, depression, chronic back pain who was transferred from [facility], where he was admitted on 11/2 after an unwitnessed fall (down for >1 day) and found to have acute hypoxic respiratory failure secondary to COVID-19, altered mental status and rhabdomyolysis. Due to increased work of breathing and oxygen requirements patient was upgraded to MICU for closer monitoring. Patient on HFNC, WOB has improved. Urine cultures positive for citrobacter koseri for which he is on Bactrim DS BID. Stable on 6LNC and pending stepdown to F.

## 2021-11-15 NOTE — PROGRESS NOTE ADULT - PROBLEM SELECTOR PLAN 1
Encephalopathy likely in setting of sepsis due to COVID pneumonia, _______  - Continue to monitor  - Psychiatry consulted for delirium, mood sx, prior concern for SI Patient with AHRF 2/2 COVID-19 Pneumonia and then found to have segmental PE's. CXR and CT angio c/w COVID pna and PE. s/p Remdesivir (completed 11/8) and decadron (11/3-11/10), not a candidate for Tocilizumab, no indication for antibiotics at this time, procalcitonin negative. D dimer uptrending from 2655 to 3549 and then down to 2844 on 11/11. CXR stable. s/p HFNC with CPAP, now on nasal canula.  - continue to wean oxygen as tolerated, currently on NC6L  - appears mildly SOB at times but no obvious iWOB currently

## 2021-11-15 NOTE — PROGRESS NOTE ADULT - ATTENDING COMMENTS
Patient seen and examined with house-staff during bedside rounds.  Resident note read, including vitals, physical findings, laboratory data, and radiological reports.   Revisions included below.  Direct personal management at bed side and extensive interpretation of the data.  Plan was outlined and discussed in details with the housestaff.  Decision making of high complexity  Action taken for acute disease activity to reflect the level of care provided:  - medication reconciliation  - review laboratory data      The patient is improving.  Mental status improved.  The chest x-ray also revealed some improvement in the left hemothorax.  Patient is to continue on the steroids and anticoagulation for acute pneumonitis and pulmonary emboli.  Out of bed in chair.  Wean off the FiO2 as tolerated.  Fluid status is stable.
Improved tachypnea on NIV, retrying HFNC with 1 hour q 4h of CPAP 8. Have increased the decadron to 10 mg. His urine is foul smelling and pyuric so pending culture have added ceftriaxone and need to watch ability to urinate on flomax. His acute hypoxic respiratory failure worsening over last 48 hours mandates ICU level of care. Have evaluated multiple times for risk of intubation requirement.
Patient seen and examined with house-staff during bedside rounds.  Resident note read, including vitals, physical findings, laboratory data, and radiological reports.   Revisions included below.  Direct personal management at bed side and extensive interpretation of the data.  Plan was outlined and discussed in details with the housestaff.  Decision making of high complexity  Action taken for acute disease activity to reflect the level of care provided:  - medication reconciliation  - review laboratory data  Patient is clinically improved.  X-ray improved but could be related to significant overexposure releases lung volume increased.  The oxygen requirement improved.  I will decrease the oxygen supplementation entrance transition to nasal cannula 6 L/min.  Hold on noninvasive ventilation at this point I does not require.  Start physical therapy.  Patient patient finished a course of remdesivir.  The patient was on antibiotic because of UTI.  10 days of Decadron.  Continue Lovenox for DVT prophylaxis venous Doppler is negative and trend the D-dimer.  No evidence of underlying thromboembolic disease.  Start physical therapy.  Blood sugar is stable.  Renal function is stable.
Patient seen and examined with house-staff during bedside rounds.  Resident note read, including vitals, physical findings, laboratory data, and radiological reports.   Revisions included below.  Direct personal management at bed side and extensive interpretation of the data.  Plan was outlined and discussed in details with the housestaff.  Decision making of high complexity  Action taken for acute disease activity to reflect the level of care provided:  - medication reconciliation  - review laboratory data  Events from overnight reviewed.  The patient required noninvasive ventilation.  The patient is not in respiratory distress.  No x-ray was done.  FiO2 was increased to 60%.  With transition to high flow nasal cannula.  Differential diagnoses either underlying thromboembolic disease with elevation of the D-dimer on Lovenox versus barotrauma versus fibrotic lung disease.  I doubt the patient has nosocomial infection.  CT scan for PE protocol.  Only concern her mental status which could be related to Covid encephalopathy versus psychiatric disorder versus language barrier.
Patient seen and examined with house-staff during bedside rounds.  Resident note read, including vitals, physical findings, laboratory data, and radiological reports.   Revisions included below.  Direct personal management at bed side and extensive interpretation of the data.  Plan was outlined and discussed in details with the housestaff.  Decision making of high complexity  Action taken for acute disease activity to reflect the level of care provided:  - medication reconciliation  - review laboratory data  Patient is clinically stable.  Patient was transitioned to nasal cannula.  Accepts 89%.  The patient was delirious it could be related to the systemic steroids.  We will decrease the steroids slowly.  Continue anticoagulation.  No evidence of aspiration.  Alert and disoriented.
Breathing slightly improved. Still with urinary retention on flomax to place posey for now secondary pain with each straight cath. continue CPAP treatments around the HFNC. Urine with Citrobacter, to start bactrim for UTI. Continue decadron.
Patient with worsening hypoxemic resp failure and work of breathing. To try to run him negative fluid balance for now with lasix and try to treat atelectasis with CPAP 8. CXR worse but PCT 0.1 not suggesting infection. Check D-dimer. High risk for need for intubation.
Patient seen and examined with house-staff during bedside rounds.  Resident note read, including vitals, physical findings, laboratory data, and radiological reports.   Revisions included below.  Direct personal management at bed side and extensive interpretation of the data.  Plan was outlined and discussed in details with the housestaff.  Decision making of high complexity  Action taken for acute disease activity to reflect the level of care provided:  - medication reconciliation  - review laboratory data  he is improving  he is to continue on anticoagulation  continue on steroids  Wean on HFNC  OOB  seen by psychiatry
Patient seen and examined with house-staff during bedside rounds.  Resident note read, including vitals, physical findings, laboratory data, and radiological reports.   Revisions included below.  Direct personal management at bed side and extensive interpretation of the data.  Plan was outlined and discussed in details with the housestaff.  Decision making of high complexity  Action taken for acute disease activity to reflect the level of care provided:  - medication reconciliation  - review laboratory data
Please see MICU note.
The patient x-ray revealed worsening of an infiltrate on the left side.  Patient is in ARDS.  Is still stable on high flow nasal cannula.  Patient is on remdesivir Decadron.  The procalcitonin is normal and as no evidence of bacterial infection at this point.  Patient is not cooperating with the prolonging but he can tolerate being on the side.  Continue oxygen supplementation.  Continue the current drone and remdesivir.  Discussed the case with the family  Patient seen and examined with house-staff during bedside rounds.  Resident note read, including vitals, physical findings, laboratory data, and radiological reports.   Revisions included below.  Direct personal management at bed side and extensive interpretation of the data.  Plan was outlined and discussed in details with the housestaff.  Decision making of high complexity  Action taken for acute disease activity to reflect the level of care provided:  - medication reconciliation  - review laboratory data
Patient seen and examined with house-staff during bedside rounds.  Resident note read, including vitals, physical findings, laboratory data, and radiological reports.   Revisions included below.  Direct personal management at bed side and extensive interpretation of the data.  Plan was outlined and discussed in details with the housestaff.  Decision making of high complexity  Action taken for acute disease activity to reflect the level of care provided:  - medication reconciliation  - review laboratory data  Patient clinically stable. He is tolerating nasal cannula. The patient participated with physical therapy. The patient desaturated with ambulation. Continue prednisone 40 mg a day at this point for another week and will taper over the next 30 days. Out of bed in chair. Physical therapy. Change to Eliquis 10 mg twice daily for 5 to 7 days then 5 mg twice daily. Evaluate for physical therapy. Had discussion with the daughter yesterday and the prefer for him to go home. The son-in-law is a physical therapist. Monitor the sodium. Fluid restriction.

## 2021-11-15 NOTE — PROGRESS NOTE ADULT - ASSESSMENT
71 year old male with PMx of HTN, depression, chronic back pain who was transferred from [facility], where he was admitted on 11/2 after an unwitnessed fall (down for >1 day) and found to have acute hypoxic respiratory failure secondary to COVID-19, altered mental status and rhabdomyolysis. Due to increased work of breathing and oxygen requirements patient was upgraded to MICU for closer monitoring. Patient on HFNC, WOB has improved. Urine cultures positive for citrobacter koseri for which he is on Bactrim DS BID. Continue to wean oxygen requirements as tolerated.

## 2021-11-15 NOTE — PROGRESS NOTE ADULT - PROBLEM SELECTOR PLAN 7
Patient with history of major depression. Home med: Mirtazapine 30mg daily. While in the ICU, patient expressed SI and was seen by Psych. As per psych, patient is low acute risk of suicide.  They do not recommend adding any medication at this time. Per psych, no psychiatric contraindications to discharge, if depressive symptoms persist, encourage psychiatric f/u as outpt.  - _____Mirtazapine 30mg Patient with history of major depression. Home med: Mirtazapine 30mg daily. While in the ICU, patient expressed SI and was seen by Psych. As per psych, patient is low acute risk of suicide.  They do not recommend adding any medication at this time. Per psych, no psychiatric contraindications to discharge, if depressive symptoms persist, encourage psychiatric f/u as outpt.  - Psychiatry consulted  - _____Mirtazapine 30mg  - 1:1 observation for agitation, suicidality  - discourage use of benzoziapines and anticholinergics  - for acute agitation, recommend haloperidol 2mg po/IM/VI Q6H PRN. Please monitor QTc with use of antipsychotics  - delirium precautions Patient with history of major depression. Home med: Mirtazapine 30mg daily. While in the ICU, patient expressed SI and was seen by Psych. As per psych, patient is low acute risk of suicide.  They do not recommend adding any medication at this time. Per psych, no psychiatric contraindications to discharge, if depressive symptoms persist, encourage psychiatric f/u as outpt.  - Psychiatry consulted  - discontinue Mirtazapine 30mg as per Psych  - 1:1 observation for increased risk of advertent and inadvertent self-harm given reported fluctuation in MS  - avoid use of benzoziapines and anticholinergics  - for acute agitation, can use haloperidol 2mg po/IM/VI Q6H PRN (monitor QTc)  - delirium precautions Patient with urinary retention likely 2/2 BPH. Patient failed TOV in MICU, posey catheter placed on 11/13.     - c/w posey for now  - c/w tamsulosin 0.4mg     #UTI  Patient with Citrobacter koseri UTI. Denies dysuria.  - s/p Bactrim DS BID (11/8-11/15) Patient with urinary retention likely 2/2 BPH. Patient failed TOV in MICU, posey catheter placed on 11/13.     - c/w posey for now  - c/w tamsulosin 0.4mg     #UTI  Patient with Citrobacter koseri UTI. Denies dysuria.  - Bactrim DS BID (11/8-11/15)

## 2021-11-15 NOTE — BH CONSULTATION LIAISON PROGRESS NOTE - OTHER
no abnormalities during interview, recently impaired motor exam deferred due to infectious precautions some confused content, no obvious depressive or delusional content. no current SI or HI some coherent thoughts expressed, at times tangential. speech difficult to understand impairing assessment

## 2021-11-15 NOTE — PROGRESS NOTE ADULT - PROBLEM SELECTOR PLAN 10
F: IVF as appropriate  E: replete K<4, Mg<2 as appropriate  N: mince and moist, mildly thick liquids as per S/S    VTE Prophylaxis: eliquis  GI: not needed  C: Full Code  D: PT rec LORIE; dispo after O2 weaned Patient with history of chronic back pain, no acute complaint during this hospitalization. Home meds: diazepam 10 mg and Narco 7.5-325mg at home.  - HOLD home meds due to somnolence  - avoid opioids for pain due to risk of oversedation

## 2021-11-15 NOTE — PROGRESS NOTE ADULT - PROBLEM SELECTOR PLAN 7
RESOLVED Patient had elevated transaminases likely in setting of Sepsis and Remdesivir.  Transaminase levels now resolved. Patient had elevated transaminases likely in setting of Sepsis and Remdesivir.  Transaminase levels now resolved.  - Continue to monitor    # Hyponatremia  Na 130 this morning, possibly hypo-osmolar hypovolemic hyponatremia.   - f/u Sosm, Uosm, Marisol

## 2021-11-15 NOTE — PROGRESS NOTE ADULT - PROBLEM SELECTOR PLAN 4
Patient with history of hypertension, on home amlodipine and losartan     To do:  -cw amlodipine 10mg  -cw losartan 50 mg Patient Covid positive on 11/3, CXR with bilateral hazy opacities c/w viral COVID PNA.  Patient s/p Remdesivir (completed 11/8) and decadron (11/3-11/10), not a candidate for Tocilizumab.  - Continue Prednisone 40mg po qd  - Currently on 6L NC, continue to wean    # Pneumonitis  In the ICU, patient started treatment for pneumonitis with solumedrol (11/11-11/15).  - Continue Prednisone 40mg po qd, and taper

## 2021-11-15 NOTE — PROGRESS NOTE ADULT - PROBLEM SELECTOR PLAN 10
F: none  E: replete prn  N: DASH diet   A: lovenox 80mg daily BID  GI: Pantoprazole 40 mg daily   Dispo: MICU Patient with history of prediabetes, A1C 5.7%.  Good glycemic control with minimal insulin coverage.   - ISS as per protocol given steroid induced hyperglycemia  - monitor FSG   - stress lifestyle modifications

## 2021-11-15 NOTE — PROGRESS NOTE ADULT - PROBLEM SELECTOR PLAN 9
Patient with history of chronic back pain, no acute complaint during this hospitalization.   - Pt on diazepam 10 mg and Narco 7.5-325mg at home -- on hold due to somnolence  - would avoid adding home opioids at this time due to risk of oversedation Patient with history of chronic back pain, no acute complaint during this hospitalization. Home meds: diazepam 10 mg and Narco 7.5-325mg at home.  - HOLD home meds due to somnolence  - avoid opioids for pain due to risk of oversedation Patient with history of prediabetes, A1C 5.7%. -180 in last 24hrs.  - ISS for steroid induced hyperglycemia  - monitor FSG

## 2021-11-15 NOTE — PROGRESS NOTE ADULT - PROBLEM SELECTOR PLAN 12
Patient with history of major depression, home medicine Mirtazapine 30mg daily.  While in the ICU, patient expressed SI and was seen by Psych. As per psych, patient is low acute risk of suicide.  They do not recommend adding any medication at this time. Per psych, no psychiatric contraindications to discharge, if depressive symptoms persist, encourage psychiatric f/u as outpt.  - Continue Mirtazapine 30mg    # Prophylactic Measures  F: none  E: replete PRN  N: Dash diet    GI: PPI  DVT: Lovenox     Code: full  Dispo: RMF

## 2021-11-15 NOTE — PROGRESS NOTE ADULT - PROBLEM SELECTOR PLAN 1
- 2/2 COVID  vs psych vs language barrier  - UTI likely also contributing to patient's AMS  - met SIRS criteria 2/4  on admission  - Pt on diazepam 10 mg and Narco 7.5-325mg at home -- on hold due to somnolence  - s/p HFNC with CPAP QHS and remdesevir   - s/p decadron 10 mg IV for 10 days  - pt has a h/o depression and psych was consulted to rule out underlying psych component.    To do:  - f/u psych recs  - cw solumedrol 30 mg IV BID Encephalopathy was likely in setting of sepsis due to COVID pneumonia, patient mental status back at baseline.    - Continue to monitor Encephalopathy likely in setting of sepsis due to COVID pneumonia as well as underlying depression, patient A&Ox3, mental status possibly back at baseline.    - Continue to monitor

## 2021-11-15 NOTE — PROGRESS NOTE ADULT - SUBJECTIVE AND OBJECTIVE BOX
TRANSFER FROM [facility] TO [facility]    HOSPITAL COURSE: 71 year old male with PMx of HTN, depression, chronic back pain who was transferred from [facility], where he was admitted on 11/2 after an unwitnessed fall (down for >1 day), on arrival at [facility], patient found to have acute hypoxic respiratory failure secondary to COVID-19 pneumonia (not vaccinated for COVID), altered mental status, and rhabdomyolyis. Patient was started on remdesivir and decadron.  Pt started having increased work of breathing and O2 requirements on 11/6 and he was subsequently transferred to MICU for further management. In the MICU, he was placed on BiPAP and had to be straight catheterized multiple times due to urine retention. Urine culture was positive for citrobacter, he was started on ceftriaxone switch to bactrim and a foleys was placed in. He was eventually weaned off to HFNC with intermittent cPAP. He was further weaned to NC 3L and was saturating 93%. However, on 11/10 he started desating to around 85%, was placed on CPAP and improved to 94%. A CTPE showed  Acute PE segmental branches right upper lobe posterior segment and right lower lobe posterior basal segment. He was started on full dose anticoag but he failed TOV multiple times. He currently has foleys in, the HFNC has been de escalated to NC to 6 L and he is sating at 92% stable to be stepped down to [facility].    Patient is a 71y old  Male who presents with a chief complaint of Acute hypoxic respiratory failure 2/2 covid pna (15 Nov 2021 06:59)      INTERVAL HPI/OVERNIGHT EVENTS:   No overnight events   Afebrile, hemodynamically stable     ICU Vital Signs Last 24 Hrs  T(C): 36.7 (15 Nov 2021 05:30), Max: 36.7 (15 Nov 2021 05:30)  T(F): 98 (15 Nov 2021 05:30), Max: 98 (15 Nov 2021 05:30)  HR: 88 (15 Nov 2021 09:10) (52 - 100)  BP: 124/78 (15 Nov 2021 09:10) (107/64 - 149/77)  BP(mean): 97 (15 Nov 2021 09:10) (77 - 107)  ABP: --  ABP(mean): --  RR: 18 (15 Nov 2021 09:10) (12 - 31)  SpO2: 96% (15 Nov 2021 09:10) (89% - 98%)    I&O's Summary    14 Nov 2021 07:01  -  15 Nov 2021 07:00  --------------------------------------------------------  IN: 0 mL / OUT: 1770 mL / NET: -1770 mL    PHYSICAL EXAM:  GENERAL: lying in bed, no acute distress, breathing with 6L NC  HEAD:  Atraumatic, Normocephalic  EYES: EOMI, PERRLA, conjunctiva and sclera clear  NECK: Supple, No JVD, Normal thyroid, no enlarged nodes  NERVOUS SYSTEM:  Alert & Awake.   CHEST/LUNG: B/L good air entry; No rales, rhonchi, or wheezing  HEART: S1S2 normal, no S3, Regular rate and rhythm; No murmurs  ABDOMEN: Soft, Nontender, Nondistended; Bowel sounds present  EXTREMITIES:  2+ Peripheral Pulses, No clubbing, cyanosis, or edema  LYMPH: No lymphadenopathy noted  SKIN: No rashes or lesions    LABS:                        16.5   16.75 )-----------( 266      ( 15 Nov 2021 08:03 )             50.4     11-15    130<L>  |  100  |  26<H>  ----------------------------<  104<H>  See Note   |  20<L>  |  0.78    Ca    9.8      15 Nov 2021 08:03  Phos  2.7     11-15  Mg     2.3     11-15    TPro  6.5  /  Alb  2.9<L>  /  TBili  0.6  /  DBili  x   /  AST  31  /  ALT  29  /  AlkPhos  76  11-14        CAPILLARY BLOOD GLUCOSE      POCT Blood Glucose.: 115 mg/dL (15 Nov 2021 06:05)  POCT Blood Glucose.: 152 mg/dL (14 Nov 2021 21:10)  POCT Blood Glucose.: 169 mg/dL (14 Nov 2021 11:57)        RADIOLOGY & ADDITIONAL TESTS:    Consultant(s) Notes Reviewed:  [x ] YES  [ ] NO    MEDICATIONS  (STANDING):  amLODIPine   Tablet 10 milliGRAM(s) Oral every 24 hours  chlorhexidine 2% Cloths 1 Application(s) Topical <User Schedule>  enoxaparin Injectable 80 milliGRAM(s) SubCutaneous every 12 hours  insulin lispro (ADMELOG) corrective regimen sliding scale   SubCutaneous Before meals and at bedtime  losartan 50 milliGRAM(s) Oral daily  methylPREDNISolone sodium succinate Injectable 30 milliGRAM(s) IV Push every 12 hours  mirtazapine 30 milliGRAM(s) Oral at bedtime  multivitamin 1 Tablet(s) Oral daily  pantoprazole    Tablet 40 milliGRAM(s) Oral before breakfast  polyethylene glycol 3350 17 Gram(s) Oral every 12 hours  senna 2 Tablet(s) Oral at bedtime  tamsulosin 0.4 milliGRAM(s) Oral at bedtime  trimethoprim  160 mG/sulfamethoxazole 800 mG 1 Tablet(s) Oral every 12 hours    MEDICATIONS  (PRN):  acetaminophen     Tablet .. 650 milliGRAM(s) Oral every 6 hours PRN Temp greater or equal to 38C (100.4F), Mild Pain (1 - 3)    Care Discussed with Consultants/Other Providers [ x] YES  [ ] NO TRANSFER FROM [facility] TO [facility]    HOSPITAL COURSE: 71 year old male with PMx of HTN, depression, chronic back pain who was transferred from [facility], where he was admitted on 11/2 after an unwitnessed fall (down for >1 day), on arrival at [facility], patient found to have acute hypoxic respiratory failure secondary to COVID-19 pneumonia (not vaccinated for COVID), altered mental status, and rhabdomyolyis. Patient was started on Remdesivir (completed 11/8) and decadron (10/3-10/10).  Pt started having increased work of breathing and O2 requirements on 11/6 and he was subsequently transferred to MICU for further management. In the MICU, he was placed on BiPAP and had to be straight catheterized multiple times due to urine retention. Urine culture was positive for citrobacter, he was started on ceftriaxone switch to bactrim and a posey was placed in. He was eventually weaned off to HFNC with intermittent cPAP. He was further weaned to NC 3L and was saturating 93%. However, on 11/10 he started desaturating to around 85%, was placed on CPAP and improved to 94%. A CTPE showed  Acute PE segmental branches right upper lobe posterior segment and right lower lobe posterior basal segment. He was started on full dose anticoagulation but he failed TOV multiple times. In the ICU patient was started on 30mg Methylprednisolone, now transitioned to 40mg Prednisone. He currently has posey in, the HFNC has been de escalated to NC to 6 L and he is sating at 92% stable to be stepped down to F.    INTERVAL HPI/OVERNIGHT EVENTS:   No overnight events, patient afebrile, hemodynamically stable.  He reports breathing is improved, does not report n/v/f/c, cp, abdominal pain, dysuria.     ICU Vital Signs Last 24 Hrs  T(C): 36.7 (15 Nov 2021 05:30), Max: 36.7 (15 Nov 2021 05:30)  T(F): 98 (15 Nov 2021 05:30), Max: 98 (15 Nov 2021 05:30)  HR: 88 (15 Nov 2021 09:10) (52 - 100)  BP: 124/78 (15 Nov 2021 09:10) (107/64 - 149/77)  BP(mean): 97 (15 Nov 2021 09:10) (77 - 107)  ABP: --  ABP(mean): --  RR: 18 (15 Nov 2021 09:10) (12 - 31)  SpO2: 96% (15 Nov 2021 09:10) (89% - 98%)    I&O's Summary    14 Nov 2021 07:01  -  15 Nov 2021 07:00  --------------------------------------------------------  IN: 0 mL / OUT: 1770 mL / NET: -1770 mL    PHYSICAL EXAM:  GENERAL: lying in bed, no acute distress, breathing with 6L NC  HEAD:  Atraumatic, Normocephalic  EYES: EOMI, PERRLA, conjunctiva and sclera clear  NECK: Supple, No JVD, Normal thyroid, no enlarged nodes  NERVOUS SYSTEM:  Alert & Awake.   CHEST/LUNG: B/L good air entry; No rales, rhonchi, or wheezing  HEART: S1S2 normal, no S3, Regular rate and rhythm; No murmurs  ABDOMEN: Soft, Nontender, Nondistended; Bowel sounds present  EXTREMITIES:  2+ Peripheral Pulses, No clubbing, cyanosis, or edema  LYMPH: No lymphadenopathy noted  SKIN: No rashes or lesions    LABS:                        16.5   16.75 )-----------( 266      ( 15 Nov 2021 08:03 )             50.4     11-15    130<L>  |  100  |  26<H>  ----------------------------<  104<H>  See Note   |  20<L>  |  0.78    Ca    9.8      15 Nov 2021 08:03  Phos  2.7     11-15  Mg     2.3     11-15    TPro  6.5  /  Alb  2.9<L>  /  TBili  0.6  /  DBili  x   /  AST  31  /  ALT  29  /  AlkPhos  76  11-14        CAPILLARY BLOOD GLUCOSE      POCT Blood Glucose.: 115 mg/dL (15 Nov 2021 06:05)  POCT Blood Glucose.: 152 mg/dL (14 Nov 2021 21:10)  POCT Blood Glucose.: 169 mg/dL (14 Nov 2021 11:57)        RADIOLOGY & ADDITIONAL TESTS:    Consultant(s) Notes Reviewed:  [x ] YES  [ ] NO    MEDICATIONS  (STANDING):  amLODIPine   Tablet 10 milliGRAM(s) Oral every 24 hours  chlorhexidine 2% Cloths 1 Application(s) Topical <User Schedule>  enoxaparin Injectable 80 milliGRAM(s) SubCutaneous every 12 hours  insulin lispro (ADMELOG) corrective regimen sliding scale   SubCutaneous Before meals and at bedtime  losartan 50 milliGRAM(s) Oral daily  methylPREDNISolone sodium succinate Injectable 30 milliGRAM(s) IV Push every 12 hours  mirtazapine 30 milliGRAM(s) Oral at bedtime  multivitamin 1 Tablet(s) Oral daily  pantoprazole    Tablet 40 milliGRAM(s) Oral before breakfast  polyethylene glycol 3350 17 Gram(s) Oral every 12 hours  senna 2 Tablet(s) Oral at bedtime  tamsulosin 0.4 milliGRAM(s) Oral at bedtime  trimethoprim  160 mG/sulfamethoxazole 800 mG 1 Tablet(s) Oral every 12 hours    MEDICATIONS  (PRN):  acetaminophen     Tablet .. 650 milliGRAM(s) Oral every 6 hours PRN Temp greater or equal to 38C (100.4F), Mild Pain (1 - 3)    Care Discussed with Consultants/Other Providers [ x] YES  [ ] NO

## 2021-11-15 NOTE — PROGRESS NOTE ADULT - PROBLEM SELECTOR PROBLEM 12
Prophylactic measure
Major depression

## 2021-11-15 NOTE — CHART NOTE - NSCHARTNOTEFT_GEN_A_CORE
INFECTIOUS DISEASES BRIEF NOTE    Called to comment on COVID-19 isolation clearance.    Initially tested positive on 11/2 and was BiPAP, now respiratory status improved on NC.  Had low grade fever on 11/13 in setting of new PE.  Afebrile >24h.  OK to discontinue isolation based on the hospital guideline.     Hospital guideline can be found at Intranet website --> Public Health emergency: COVID-19 (click here to visit our resource platform) --> Patient Screening "COVID 19 Testing and Retesting Guidelines"    If you have any questions, please feel free to contact me.  Case d/w primary team and hospital epidemiology.   *this is not a consult note*    Jovana Mora MD, MS  Infectious Disease attending  work cell [phone]

## 2021-11-15 NOTE — PROGRESS NOTE ADULT - PROBLEM SELECTOR PLAN 6
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 Patient with history of hypertension, on home amlodipine and losartan. BP acceptable.  -c/w amlodipine 10mg  -c/w losartan 50 mg

## 2021-11-15 NOTE — PROGRESS NOTE ADULT - PROBLEM SELECTOR PLAN 9
- would avoid adding home opioids at this time due to risk of oversedation Patient had hypernatremia while in the ICU, which is now RESOLVED.

## 2021-11-15 NOTE — PROGRESS NOTE ADULT - PROBLEM SELECTOR PLAN 4
Patient Covid positive on 11/3, CXR with bilateral hazy opacities c/w viral COVID PNA. CT also consistent with Covid PNA. Patient s/p Remdesivir (completed 11/8) and decadron (11/3-11/10), not a candidate for Tocilizumab.  - c/w Prednisone 40mg po qd (11/15- )  - Currently on 6L NC, continue to wean as tolerated    # Pneumonitis  In the ICU, patient started treatment for pneumonitis with solumedrol (11/11-11/15).  - management as above with steroids Encephalopathy likely in setting of mixed delirium 2/2 to multiple factors (covid pneumonia, UTI, use of IV steroids).  - Continue to monitor  - Psychiatry consulted for delirium, mood sx, prior concern for SI  - discontinue Mirtazapine 30mg as per Psych  - 1:1 observation for increased risk of advertent and inadvertent self-harm given reported fluctuation in MS  - avoid use of benzoziapines and anticholinergics  - for acute agitation, can use haloperidol 2mg po/IM/VI Q6H PRN (monitor QTc)  - delirium precautions

## 2021-11-15 NOTE — PROGRESS NOTE ADULT - PROBLEM SELECTOR PLAN 6
Patient with urinary retention likely 2/2 BPH. Patient failed TOV, posey catheter placed on 11/13.     - c/w posey for now, repeat tov   - c/w tamsulosin 0.4mg     #UTI  Patient with Citrobacter koseri UTI  - s/p Bactrim DS BID (11/8-11/15) Patient with urinary retention likely 2/2 BPH. Patient failed TOV in MICU, posey catheter placed on 11/13.     - c/w posey for now  - c/w tamsulosin 0.4mg     #UTI  Patient with Citrobacter koseri UTI. Denies dysuria.  - s/p Bactrim DS BID (11/8-11/15) Patient with history of hypertension. Home meds: amlodipine 10mg and losartan-HCTZ 100/25. -140's last 24hrs.  -c/w amlodipine 10mg  -c/w losartan 50 mg (uptitrate as appropriate to home dose)

## 2021-11-15 NOTE — PROGRESS NOTE ADULT - PROBLEM SELECTOR PLAN 8
Patient with history of prediabetes, A1C 5.7%.  Good glycemic control with minimal insulin coverage.   - ISS as per protocol given steroid induced hyperglycemia  - monitor FSG   - stress lifestyle modifications Patient with history of prediabetes, A1C 5.7%. -180 in last 24hrs.  - ISS for steroid induced hyperglycemia  - monitor FSG Patient with history of major depression. Home med: Mirtazapine 30mg daily. While in the ICU, patient expressed SI and was seen by Psych. As per psych, patient is low acute risk of suicide.  They do not recommend adding any medication at this time. Per psych, no psychiatric contraindications to discharge, if depressive symptoms persist, encourage psychiatric f/u as outpt.  - Psychiatry consulted  - discontinue Mirtazapine 30mg as per Psych  - 1:1 observation for increased risk of advertent and inadvertent self-harm given reported fluctuation in MS  - avoid use of benzoziapines and anticholinergics  - for acute agitation, can use haloperidol 2mg po/IM/VI Q6H PRN (monitor QTc)  - delirium precautions

## 2021-11-15 NOTE — PROGRESS NOTE ADULT - PROBLEM SELECTOR PLAN 5
Patient with history of hypertension. Home meds: amlodipine 10mg and losartan-HCTZ 100/25. BP acceptable.  -c/w amlodipine 10mg  -c/w losartan 50 mg (uptitrate as appropriate to home dose) Patient with history of hypertension. Home meds: amlodipine 10mg and losartan-HCTZ 100/25. -140's last 24hrs.  -c/w amlodipine 10mg  -c/w losartan 50 mg (uptitrate as appropriate to home dose) Labs with hyponatremia last 2 days. Labs with hyponatremia last 2 days. Na 130 today. Without recent Labs with hyponatremia last 2 days. Na 130 today. Possibly hypotonic hyponatremia.   - f/u urine and serum Osm

## 2021-11-15 NOTE — PROGRESS NOTE ADULT - PROBLEM SELECTOR PLAN 3
- likely due to increased metabolic demands i/s/o COVID infection as well as PE  - echo with EF 55%, mild tricuspid regurg   - DVT study negative  - CTPE +  - TSH normal    To do:  - continuous telemetry monitoring  -  full anticoagulation with lovenox 80 mg BID IV CTPE on 11/10: Acute PE segmental branches right upper lobe posterior segment and right lower lobe posterior basal segment. CXR stable, TTE with mild tricuspid regurgitation, normal right ventricle size and systolic function. S/p Lovenox 11/10-11/15.  D dimer uptrending from 2655 to 3549 and then down to 2844 on 11/11.  - Continue Eliquis 10mg BID through 11/17 than Eliquis 5mg BID CTPE on 11/10: Acute PE segmental branches right upper lobe posterior segment and right lower lobe posterior basal segment. CXR stable, TTE with mild tricuspid regurgitation, normal right ventricle size and systolic function. S/p Lovenox 11/10-11/15.  - Continue Eliquis 10mg BID through 11/17 than Eliquis 5mg BID

## 2021-11-15 NOTE — PROGRESS NOTE ADULT - PROBLEM SELECTOR PLAN 2
Patient with AHRF 2/2 COVID-19 Pneumonia and then found to have segmental PE's. CXR and CT angio c/w COVID pna and PE. Patient s/p Remdesivir (completed 11/8) and decadron (11/3-11/10), not a candidate for Tocilizumab, no indication for antibiotics at this time, procalcitonin negative. D dimer uptrending from 2655 to 3549 and then down to 2844 on 11/11. CXR stable. s/p HFNC with CPAP, now on nasal canula  - continue to wean oxygen as tolerated, currently on NC6L CTPE on 11/10: Acute PE segmental branches right upper lobe posterior segment and right lower lobe posterior basal segment. TTE with mild tricuspid regurgitation, normal right ventricle size and systolic function--no RV strain. S/p Lovenox 11/10-11/15. D dimer uptrending from 2655 to 3549 and then down to 2844 on 11/11.  - c/w Eliquis 10mg BID through 11/17 then Eliquis 5mg BID

## 2021-11-15 NOTE — PROGRESS NOTE ADULT - PROBLEM SELECTOR PLAN 8
- A1C 5.7%    To do:  - ISS as per protocol given steroid induced hyperglycemia  - monitor FSG   - stress lifestyle modifications Patient with urinary retention likely 2/2 BPH.  Patient failed TOV, posey catheter placed on 11/13.     - Continue posey for now, repeat tov   - Continue tamsulosin 0.4mg     #UTI  Patient with Citrobacter koseri UTI  - Continue Bactrim DS BID

## 2021-11-15 NOTE — PROGRESS NOTE ADULT - PROBLEM SELECTOR PLAN 2
- COVID-19 Pneumonia: CXR with bilateral hazy opacities c/w viral COVID PNA  - not a candidate for Tocilizumab, no indication for antibiotics at this time, procalcitonin negative  - s/p Remdesivir    - D dimer uptrending from 2655 to 3549 and then down to 2844 on 11/11  - CTPE on 11/10: Acute PE segmental branches right upper lobe posterior segment and right lower lobe posterior basal segment. Extensive bilateral groundglass lung opacities with scattered patchy consolidations.  - CXR on 11/11  Bilateral asymmetrical opacities/left greater than right, stable.   - s/p decadron 10 mg IV for 10 days  - 2/2 PE and non IPF interstitial lung disease + hypersensitivity pneumonitis  -- cw HFNC with a goal SpO2 of 88-90% - continue to wean oxygen as tolerated, currently on 40/50    To do:  - cw HFNC alternate with NC with a goal SpO2 of 88-90%  - cw solumedrol 30 mg IV BID  - cw full anticoagulation with lovenox 80 mg BID IV    #PE  -per CT chest on 11/10 Acute PE right lower lobe posterior basal segmental arteries and right upper lobe posterior segment pulmonary artery. acute pneumonitis  -continue on full AC with Lovenox 80mg BID IV  -continue to wean HFNC requirements with NC with a goal SpO2 of 88-90% Patient with AHRF 2/2 COVID-19 Pneumonia and Pulmonary Embolism. CXR with bilateral hazy opacities c/w viral COVID PNA.  Patient s/p Remdesivir (completed 11/8) and decadron (10/3-10/10), not a candidate for Tocilizumab, no indication for antibiotics at this time, procalcitonin negative. D dimer uptrending from 2655 to 3549 and then down to 2844 on 11/11. CTPE on 11/10: Acute PE segmental branches right upper lobe posterior segment and right lower lobe posterior basal segment. CXR stable. s/p HFNC with CPAP, now on nasal canula  - continue to wean oxygen as tolerated, currently on NC Patient with AHRF 2/2 COVID-19 Pneumonia and Pulmonary Embolism. s/p HFNC with CPAP, now on nasal canula.  - continue to wean oxygen as tolerated, currently on NC

## 2021-11-15 NOTE — PROGRESS NOTE ADULT - PROBLEM SELECTOR PLAN 5
RESOLVED Patient was intermittently with sinus tachycardia while in ICU, likely due to increased metabolic demands i/s/o COVID infection as well as PE. Echo with EF 55%, mild tricuspid regurg.  TSH normal. HR now improved.   - Continue to monitor Patient was intermittently with sinus tachycardia while in ICU, likely due to increased metabolic demands i/s/o COVID infection as well as PE. Echo with EF 55%, mild tricuspid regurg.  TSH normal. HR now improved.   - Continue to monitor HR

## 2021-11-15 NOTE — PROGRESS NOTE ADULT - PROBLEM SELECTOR PLAN 3
CTPE on 11/10: Acute PE segmental branches right upper lobe posterior segment and right lower lobe posterior basal segment. TTE with mild tricuspid regurgitation, normal right ventricle size and systolic function--no RV strain. S/p Lovenox 11/10-11/15.  D dimer uptrending from 2655 to 3549 and then down to 2844 on 11/11.  - c/w Eliquis 10mg BID through 11/17 then Eliquis 5mg BID Patient Covid positive on 11/3, CXR with bilateral hazy opacities c/w viral COVID PNA. CT also consistent with Covid PNA. Patient s/p Remdesivir (completed 11/8) and decadron (11/3-11/10), not a candidate for Tocilizumab.  - c/w Prednisone 40mg po qd (11/15- ) for prolonged taper  - Currently on 6L NC, continue to wean as tolerated    # Pneumonitis  In the ICU, patient started treatment for pneumonitis with solumedrol (11/11-11/15).  - management as above with steroids

## 2021-11-16 ENCOUNTER — TRANSCRIPTION ENCOUNTER (OUTPATIENT)
Age: 71
End: 2021-11-16

## 2021-11-16 LAB
ANION GAP SERPL CALC-SCNC: 11 MMOL/L — SIGNIFICANT CHANGE UP (ref 5–17)
BASOPHILS # BLD AUTO: 0.02 K/UL — SIGNIFICANT CHANGE UP (ref 0–0.2)
BASOPHILS NFR BLD AUTO: 0.1 % — SIGNIFICANT CHANGE UP (ref 0–2)
BUN SERPL-MCNC: 24 MG/DL — HIGH (ref 7–23)
CALCIUM SERPL-MCNC: 9.6 MG/DL — SIGNIFICANT CHANGE UP (ref 8.4–10.5)
CHLORIDE SERPL-SCNC: 98 MMOL/L — SIGNIFICANT CHANGE UP (ref 96–108)
CO2 SERPL-SCNC: 21 MMOL/L — LOW (ref 22–31)
CREAT SERPL-MCNC: 0.82 MG/DL — SIGNIFICANT CHANGE UP (ref 0.5–1.3)
EOSINOPHIL # BLD AUTO: 0 K/UL — SIGNIFICANT CHANGE UP (ref 0–0.5)
EOSINOPHIL NFR BLD AUTO: 0 % — SIGNIFICANT CHANGE UP (ref 0–6)
GLUCOSE BLDC GLUCOMTR-MCNC: 103 MG/DL — HIGH (ref 70–99)
GLUCOSE BLDC GLUCOMTR-MCNC: 123 MG/DL — HIGH (ref 70–99)
GLUCOSE BLDC GLUCOMTR-MCNC: 149 MG/DL — HIGH (ref 70–99)
GLUCOSE SERPL-MCNC: 108 MG/DL — HIGH (ref 70–99)
HCT VFR BLD CALC: 45.4 % — SIGNIFICANT CHANGE UP (ref 39–50)
HGB BLD-MCNC: 15 G/DL — SIGNIFICANT CHANGE UP (ref 13–17)
IMM GRANULOCYTES NFR BLD AUTO: 1.5 % — SIGNIFICANT CHANGE UP (ref 0–1.5)
LYMPHOCYTES # BLD AUTO: 1.65 K/UL — SIGNIFICANT CHANGE UP (ref 1–3.3)
LYMPHOCYTES # BLD AUTO: 10.9 % — LOW (ref 13–44)
MAGNESIUM SERPL-MCNC: 2.2 MG/DL — SIGNIFICANT CHANGE UP (ref 1.6–2.6)
MCHC RBC-ENTMCNC: 29.3 PG — SIGNIFICANT CHANGE UP (ref 27–34)
MCHC RBC-ENTMCNC: 33 GM/DL — SIGNIFICANT CHANGE UP (ref 32–36)
MCV RBC AUTO: 88.7 FL — SIGNIFICANT CHANGE UP (ref 80–100)
MONOCYTES # BLD AUTO: 1.34 K/UL — HIGH (ref 0–0.9)
MONOCYTES NFR BLD AUTO: 8.9 % — SIGNIFICANT CHANGE UP (ref 2–14)
NEUTROPHILS # BLD AUTO: 11.9 K/UL — HIGH (ref 1.8–7.4)
NEUTROPHILS NFR BLD AUTO: 78.6 % — HIGH (ref 43–77)
NRBC # BLD: 0 /100 WBCS — SIGNIFICANT CHANGE UP (ref 0–0)
PHOSPHATE SERPL-MCNC: 2.6 MG/DL — SIGNIFICANT CHANGE UP (ref 2.5–4.5)
PLATELET # BLD AUTO: 289 K/UL — SIGNIFICANT CHANGE UP (ref 150–400)
POTASSIUM SERPL-MCNC: 4.4 MMOL/L — SIGNIFICANT CHANGE UP (ref 3.5–5.3)
POTASSIUM SERPL-SCNC: 4.4 MMOL/L — SIGNIFICANT CHANGE UP (ref 3.5–5.3)
RBC # BLD: 5.12 M/UL — SIGNIFICANT CHANGE UP (ref 4.2–5.8)
RBC # FLD: 13.7 % — SIGNIFICANT CHANGE UP (ref 10.3–14.5)
SODIUM SERPL-SCNC: 130 MMOL/L — LOW (ref 135–145)
WBC # BLD: 15.14 K/UL — HIGH (ref 3.8–10.5)
WBC # FLD AUTO: 15.14 K/UL — HIGH (ref 3.8–10.5)

## 2021-11-16 PROCEDURE — 99232 SBSQ HOSP IP/OBS MODERATE 35: CPT

## 2021-11-16 PROCEDURE — 99232 SBSQ HOSP IP/OBS MODERATE 35: CPT | Mod: GC

## 2021-11-16 PROCEDURE — 74230 X-RAY XM SWLNG FUNCJ C+: CPT | Mod: 26

## 2021-11-16 RX ORDER — AMLODIPINE BESYLATE 2.5 MG/1
1 TABLET ORAL
Qty: 0 | Refills: 0 | DISCHARGE
Start: 2021-11-16

## 2021-11-16 RX ORDER — MIRTAZAPINE 45 MG/1
1 TABLET, ORALLY DISINTEGRATING ORAL
Qty: 0 | Refills: 0 | DISCHARGE

## 2021-11-16 RX ORDER — AMLODIPINE BESYLATE 2.5 MG/1
1 TABLET ORAL
Qty: 0 | Refills: 0 | DISCHARGE

## 2021-11-16 RX ORDER — SODIUM CHLORIDE 9 MG/ML
1000 INJECTION INTRAMUSCULAR; INTRAVENOUS; SUBCUTANEOUS
Refills: 0 | Status: DISCONTINUED | OUTPATIENT
Start: 2021-11-16 | End: 2021-11-17

## 2021-11-16 RX ORDER — APIXABAN 2.5 MG/1
1 TABLET, FILM COATED ORAL
Qty: 60 | Refills: 1
Start: 2021-11-16 | End: 2022-01-14

## 2021-11-16 RX ORDER — TAMSULOSIN HYDROCHLORIDE 0.4 MG/1
1 CAPSULE ORAL
Qty: 30 | Refills: 0
Start: 2021-11-16 | End: 2021-12-15

## 2021-11-16 RX ORDER — DIAZEPAM 5 MG
1 TABLET ORAL
Qty: 0 | Refills: 0 | DISCHARGE

## 2021-11-16 RX ADMIN — SENNA PLUS 2 TABLET(S): 8.6 TABLET ORAL at 22:51

## 2021-11-16 RX ADMIN — AMLODIPINE BESYLATE 10 MILLIGRAM(S): 2.5 TABLET ORAL at 06:30

## 2021-11-16 RX ADMIN — Medication 40 MILLIGRAM(S): at 18:26

## 2021-11-16 RX ADMIN — TAMSULOSIN HYDROCHLORIDE 0.4 MILLIGRAM(S): 0.4 CAPSULE ORAL at 22:51

## 2021-11-16 RX ADMIN — APIXABAN 10 MILLIGRAM(S): 2.5 TABLET, FILM COATED ORAL at 13:16

## 2021-11-16 RX ADMIN — POLYETHYLENE GLYCOL 3350 17 GRAM(S): 17 POWDER, FOR SOLUTION ORAL at 18:27

## 2021-11-16 RX ADMIN — Medication 1 TABLET(S): at 13:17

## 2021-11-16 RX ADMIN — POLYETHYLENE GLYCOL 3350 17 GRAM(S): 17 POWDER, FOR SOLUTION ORAL at 06:32

## 2021-11-16 RX ADMIN — APIXABAN 10 MILLIGRAM(S): 2.5 TABLET, FILM COATED ORAL at 23:08

## 2021-11-16 RX ADMIN — PANTOPRAZOLE SODIUM 40 MILLIGRAM(S): 20 TABLET, DELAYED RELEASE ORAL at 06:30

## 2021-11-16 RX ADMIN — SODIUM CHLORIDE 75 MILLILITER(S): 9 INJECTION INTRAMUSCULAR; INTRAVENOUS; SUBCUTANEOUS at 18:27

## 2021-11-16 RX ADMIN — LOSARTAN POTASSIUM 50 MILLIGRAM(S): 100 TABLET, FILM COATED ORAL at 13:17

## 2021-11-16 NOTE — DISCHARGE NOTE PROVIDER - NSDCCPCAREPLAN_GEN_ALL_CORE_FT
PRINCIPAL DISCHARGE DIAGNOSIS  Diagnosis: Acute respiratory failure with hypoxia  Assessment and Plan of Treatment: You came to the hospital with lethargy and difficulty breathing. You were found to have COVID pneumonia. You were treated in the ICU with strong steroids and remdesivir. You gradually improved and were moved to the general medical floors where you continued steroid treatment with Prednisone and used supplemental oxygen therapy.   You will continue using supplemental oxygen at home. You will also continue to take Prednisone.  Please take Prednisone 40mg orally, once per day for 1 week. Follow up with Dr. Dangelo regarding the dosing after that.   Please follow up with Dr. Dangelo in 1 week to monitor your recovery from COVID pneumonia.      SECONDARY DISCHARGE DIAGNOSES  Diagnosis: Pulmonary embolism  Assessment and Plan of Treatment: While in the hospital you were found to have a pulmonary embolism which likely was contributing to your shortness of breath. You were treated with blood thinners to prevent further embolisms from  You were first started on a medication called Lovenox. You were then transitioned to an oral medication called Eliquis. You will continue taking this medication for at least 6 months.  Please take Eliquis 5mg, once per day, for 6 months.  Please follow up with Dr. Dangelo in 1 week to monitor your recovery from pulmonary embolism.       PRINCIPAL DISCHARGE DIAGNOSIS  Diagnosis: Acute respiratory failure with hypoxia  Assessment and Plan of Treatment: You came to the hospital with lethargy and difficulty breathing. You were found to have COVID pneumonia. You were treated in the ICU with strong steroids and remdesivir. You gradually improved and were moved to the general medical floors where you continued steroid treatment with Prednisone and used supplemental oxygen therapy.   You will continue using supplemental oxygen at home. You will also continue to take Prednisone.  Please take Prednisone 40mg orally, once per day for the next four days. After that you will take Prednisione 30mg for one week. Follow up with Dr. Dangelo regarding the dosing after that.   Please follow up with Dr. Dangelo in 1 week to monitor your recovery from COVID pneumonia.      SECONDARY DISCHARGE DIAGNOSES  Diagnosis: Pulmonary embolism  Assessment and Plan of Treatment: While in the hospital you were found to have a pulmonary embolism which likely was contributing to your shortness of breath. You were treated with blood thinners to prevent further embolisms from  You were first started on a medication called Lovenox. You were then transitioned to an oral medication called Eliquis. You will continue taking this medication for at least 6 months.  Please take Eliquis 10mg twice a day for 4 days. After that you will take Eliquis 5mg, twice per day, for 6 months.  Please follow up with Dr. Dangelo in 1 week to monitor your recovery from pulmonary embolism.      Diagnosis: HTN (hypertension)  Assessment and Plan of Treatment: You were previously diagnosed with high blood pressure. Please continue on your home medications at this time and follow up with your primary care physician for further surveillance and management of your high blood pressure.  We have changed your blood pressure medications from Losartan-Hydrochlorothiazide, to just taking Losartan once daily. Please continue to take only Losartan 50 once daily from now on.

## 2021-11-16 NOTE — BH CONSULTATION LIAISON PROGRESS NOTE - NSBHASSESSMENTFT_PSY_ALL_CORE
72yo M with a reported history of insomnia and mild depression, chronic back pain who presents with persistent periods of disorientation and fluctuations in arousal (predominant withdrawal, lethargy, mixed with periods of agitation, emotional lability over weekend, intermittent expression of SI) consistent with mixed delirium secondary to multiple likely contributors (covid pneumonia, UTI, use of IV steroids). Recurrent comments about suicide/death appears to be made while in acute confusional states, without any persisting SI on my evaluations of pt last week and today. Comments most likely are made as a means of expressing distress, with collateral from daughter confirming history of self-deprecating death-related comments without any concern for true suicidality/intent/plan or past attempts. However, given cognitive deficits with fluctuations in alertness and periods of agitation, pt is at increased risk for both advertent and inadvertent self-harm. Recommend continuing 1:1 observation and will continue to reassess suicidality as suspected delirium resolves.    RECOMMENDATIONS  -1:1 observation for agitation, suicidality  -recommend discontinuing mirtazapine given frequent lethargy, risk of exacerbating confusion, fall risk  -discourage use of benzoziapines and anticholinergics  -for acute agitation, recommend haloperidol 2mg po/IM/VI Q6H PRN. Please monitor QTc with use of antipsychotics  -delirium precautions  -case previously d/w pt's daughter  -will follow. please contact with questions
70yo M with a reported history of insomnia and mild depression, chronic back pain who presents with slowly resolving disorientation and confusion, significant improvement in arousal and affective lability, without any further agitation or expression of SI, all consistent with resolving mixed delirium with multiple contributors (covid pneumonia, UTI, use of IV steroids). Recurrent comments about suicide/death have resolved and were all made while in acute confusional state, without any persisting SI on my serial evaluations. Comments most likely are made as a means of expressing distress, with collateral from daughter confirming history of self-deprecating death-related comments without any concern for true suicidality/intent/plan or past attempts. Pt is now future-oriented, treatment seeking, adherent with care in the hospital, many protective factors include strong family support. Recommend enhanced supervision in setting of resolving delirium; no continued need for 1:1 for SI at this time given low acute risk of harm to self.     RECOMMENDATIONS  -recommend enhanced supervision given recent delirium with periods of agitation  -continue to hold mirtazapine given recent lethargy, risk of exacerbating confusion, fall risk - can be reassessed for indication to continue (for insomnia per daughter) as outpt.  -discourage use of benzoziapines and anticholinergics  -for acute agitation that is not verbally redirectable, recommend haloperidol 2mg po/IM/VI Q6H PRN. Please monitor QTc with use of antipsychotics  -delirium precautions  -case previously d/w pt's daughter  -will remain available as needed. please contact with questions

## 2021-11-16 NOTE — BH CONSULTATION LIAISON PROGRESS NOTE - NSBHCHARTREVIEWVS_PSY_A_CORE FT
Vital Signs Last 24 Hrs  T(C): 36.7 (15 Nov 2021 05:30), Max: 36.7 (15 Nov 2021 05:30)  T(F): 98 (15 Nov 2021 05:30), Max: 98 (15 Nov 2021 05:30)  HR: 86 (15 Nov 2021 09:38) (52 - 100)  BP: 124/78 (15 Nov 2021 09:10) (107/64 - 149/77)  BP(mean): 97 (15 Nov 2021 09:10) (77 - 107)  RR: 18 (15 Nov 2021 09:38) (12 - 31)  SpO2: 100% (15 Nov 2021 09:38) (89% - 100%)
Vital Signs Last 24 Hrs  T(C): 36.5 (16 Nov 2021 09:30), Max: 37 (16 Nov 2021 05:24)  T(F): 97.7 (16 Nov 2021 09:30), Max: 98.6 (16 Nov 2021 05:24)  HR: 89 (16 Nov 2021 09:30) (86 - 92)  BP: 106/75 (16 Nov 2021 09:30) (106/75 - 123/79)  BP(mean): 90 (15 Nov 2021 15:40) (90 - 90)  RR: 20 (16 Nov 2021 09:30) (16 - 20)  SpO2: 93% (16 Nov 2021 09:30) (93% - 96%)

## 2021-11-16 NOTE — DISCHARGE NOTE PROVIDER - NSDCFUADDAPPT_GEN_ALL_CORE_FT
Please follow up with Dr. Dangelo in 1 week to monitor your recovery from COVID pneumonia. A message has been left with Dr. Dangelo's office. They will contact you or one of your family members to schedule an appointment for next week.

## 2021-11-16 NOTE — PROGRESS NOTE ADULT - PROBLEM SELECTOR PLAN 1
Patient with AHRF 2/2 COVID-19 Pneumonia and then found to have segmental PE's. CXR and CT angio c/w COVID pna and PE. s/p Remdesivir (completed 11/8) and decadron (11/3-11/10), not a candidate for Tocilizumab, no indication for antibiotics at this time, procalcitonin negative. D dimer uptrending from 2655 to 3549 and then down to 2844 on 11/11. CXR stable. s/p HFNC with CPAP, now on nasal canula.  - continue to wean oxygen as tolerated, currently on NC6L, goal to wean to 4L  - follow up ambulatory O2 requirements Patient with AHRF 2/2 COVID-19 Pneumonia and then found to have segmental PE's. CXR and CT angio c/w COVID pna and PE. s/p Remdesivir (completed 11/8) and decadron (11/3-11/10), not a candidate for Tocilizumab, no indication for antibiotics at this time, procalcitonin negative. D dimer uptrending from 2655 to 3549 and then down to 2844 on 11/11. CXR stable. s/p HFNC with CPAP, now on nasal canula.  - continue to wean oxygen as tolerated, currently on NC6L, goal to wean to 4L  - follow up ambulatory O2 requirements    O2sat at rest with 4LNC: 95%  O2sat at rest w/o O2: 89%

## 2021-11-16 NOTE — PROGRESS NOTE ADULT - PROBLEM SELECTOR PLAN 10
Patient with history of chronic back pain, no acute complaint during this hospitalization. Home meds: diazepam 10 mg and Narco 7.5-325mg at home.  - HOLD home meds due to somnolence  - avoid opioids for pain due to risk of oversedation

## 2021-11-16 NOTE — SWALLOW VFSS/MBS ASSESSMENT ADULT - SLP PERTINENT HISTORY OF CURRENT PROBLEM
71 year old male with PMx of HTN, depression, chronic back pain who was transferred from [facility], where he was admitted on 11/2 after an unwitnessed fall (down for >1 day), on arrival at [facility], patient found to have acute hypoxic respiratory failure secondary to COVID-19 pneumonia (not vaccinated for COVID), altered mental status, and rhabdomyolyis.

## 2021-11-16 NOTE — BH CONSULTATION LIAISON PROGRESS NOTE - NSBHCONSULTFOLLOWAFTERCARE_PSY_A_CORE FT
Encourage outpt psychiatric f/u for reassessment of treatment for anxiety, insomnia, ?depression given prior Rx for diazepam and mirtazapine.   D/w daughter who plans to seek outpt care as needed in [place] if PCP unable to manage. No referral information requested.

## 2021-11-16 NOTE — DISCHARGE NOTE PROVIDER - CARE PROVIDER_API CALL
Annie Dangelo)  Critical Care Medicine; Pulmonary Disease  [address] [place] [address]  Phone: [phone]  Fax: [phone]  Established Patient  Follow Up Time: 1 week

## 2021-11-16 NOTE — PROGRESS NOTE ADULT - PROBLEM SELECTOR PLAN 5
Patient with urinary retention likely 2/2 BPH. Patient failed TOV in MICU, posey catheter placed on 11/13.     - c/w posey for now  - TOV today  - c/w tamsulosin 0.4mg     #UTI  Patient with Citrobacter koseri UTI. Denies dysuria. RESOLVED  - s/p Bactrim DS BID (11/8-11/15)

## 2021-11-16 NOTE — SWALLOW VFSS/MBS ASSESSMENT ADULT - ORAL PHASE COMMENTS
Trace-mild stasis on the posterior oral tongue after trials of solids. Clears with a secondary swallow and/or liquid wash.

## 2021-11-16 NOTE — PROGRESS NOTE ADULT - PROBLEM SELECTOR PLAN 3
Patient Covid positive on 11/3, CXR with bilateral hazy opacities c/w viral COVID PNA. CT also consistent with Covid PNA. Patient s/p Remdesivir (completed 11/8) and decadron (11/3-11/10), not a candidate for Tocilizumab.  - c/w Prednisone 40mg po qd (11/15- ) for prolonged taper (2wks)  - Currently on 6L NC, continue to wean as tolerated, goal 4LNC inpatient    # Pneumonitis  In the ICU, patient started treatment for pneumonitis with solumedrol (11/11-11/15).  - management as above with steroids

## 2021-11-16 NOTE — BH CONSULTATION LIAISON PROGRESS NOTE - NSBHCONSULTRECOMMENDOTHER_PSY_A_CORE FT
Encourage discontinuation of diazepam as outpt
Encourage continued discontinuation of diazepam as outpt given risk of falls, cognitive impairment in elderly pt

## 2021-11-16 NOTE — PROGRESS NOTE ADULT - PROBLEM SELECTOR PLAN 6
Patient with history of major depression. Home med: Mirtazapine 30mg daily. While in the ICU, patient expressed SI and was seen by Psych. As per psych, patient is low acute risk of suicide.  They do not recommend adding any medication at this time. Per psych, no psychiatric contraindications to discharge, if depressive symptoms persist, encourage psychiatric f/u as outpt.  - Psychiatry consulted  - discontinue Mirtazapine 30mg as per Psych  - 1:1 observation for increased risk of advertent and inadvertent self-harm given reported fluctuation in MS  - avoid use of benzoziapines and anticholinergics  - for acute agitation, can use haloperidol 2mg po/IM/VI Q6H PRN (monitor QTc)  - delirium precautions

## 2021-11-16 NOTE — PROGRESS NOTE ADULT - SUBJECTIVE AND OBJECTIVE BOX
Physical Medicine and Rehabilitation Progress Note:    Patient is a 71y old  Male who presents with a chief complaint of Acute hypoxic respiratory failure 2/2 covid pna (16 Nov 2021 13:50)      HPI:  71 year old male with PMx of HTN, depression, chronic back pain, transferred from [facility], where he was admitted on 11/2 after an unwitnessed fall (down for >1 day) and found to have acute hypoxic respiratory failure secondary to COVID-19 pneumonia and altered mental status. Pt is currently a poor historian, but per family, his mental status has been worsening in the past few days and he had an unwitnessed fall coming from the bathroom where he fell and remained on the floor for >1 day. He lives at home with his wife and normally ambulates independently. He is not vaccinated for covid.    Patient complained of overall weakness, but denied chest pain, SOB, fevers, diarrhea/constipation, LE pain/swelling. Virgen in place.    ED Course (at [place]):  Vitals: /91, HR 76/min, RR 16/min, 93% on RA -> 95% on 5 L NC  EKG Right bundle branch block  Abnormal Labs: Covid+. Plt 127, D-dimers 988, Trop-I 247 then 244 after 6 hrs, K 3.0, Mg 2.8, BUN 38, Alb 2.6, , ALT 96, [place] [address] then 9896 after 6 hrs, pH 7.51, pCO2 37, pO2 72, HCO3 30, Utox positive for BZD (home med). UA dirty.  Imaging: CXR: infiltration in mid/lower left lung field and milder amount in right lung field. Consistent with covid pna. CTH: No hydrocephalus, acute intracranial hemorrhage, mass effect, or brain edema.  Interventions: 300 ASA rectal, Lovenox 80mg x1, 2 L NS then 150 ml/hr for 6 hrs, Decadron 6mg IV, Potassium 10mg IV x3.      **Note on home meds: As per pt's daughter Manuela, pt takes Hydrocodone, Diazepam, Mirtazapine at home due to "back surgeries/back problems and depression." She does not know the full list or the doses. Pt also on unknown anti-hypertensives. Will call pharmacy in AM.   (03 Nov 2021 03:08)                            15.0   15.14 )-----------( 289      ( 16 Nov 2021 08:21 )             45.4       11-16    130<L>  |  98  |  24<H>  ----------------------------<  108<H>  4.4   |  21<L>  |  0.82    Ca    9.6      16 Nov 2021 08:21  Phos  2.6     11-16  Mg     2.2     11-16      Vital Signs Last 24 Hrs  T(C): 36.5 (16 Nov 2021 09:30), Max: 37 (16 Nov 2021 05:24)  T(F): 97.7 (16 Nov 2021 09:30), Max: 98.6 (16 Nov 2021 05:24)  HR: 88 (16 Nov 2021 13:30) (86 - 92)  BP: 116/81 (16 Nov 2021 13:30) (106/75 - 123/79)  BP(mean): 90 (15 Nov 2021 15:40) (90 - 90)  RR: 20 (16 Nov 2021 09:30) (16 - 20)  SpO2: 83% (16 Nov 2021 14:00) (83% - 96%)    MEDICATIONS  (STANDING):  amLODIPine   Tablet 10 milliGRAM(s) Oral every 24 hours  apixaban 10 milliGRAM(s) Oral every 12 hours  insulin lispro (ADMELOG) corrective regimen sliding scale   SubCutaneous Before meals and at bedtime  losartan 50 milliGRAM(s) Oral daily  multivitamin 1 Tablet(s) Oral daily  pantoprazole    Tablet 40 milliGRAM(s) Oral before breakfast  polyethylene glycol 3350 17 Gram(s) Oral every 12 hours  predniSONE   Tablet 40 milliGRAM(s) Oral daily  senna 2 Tablet(s) Oral at bedtime  tamsulosin 0.4 milliGRAM(s) Oral at bedtime    MEDICATIONS  (PRN):  acetaminophen     Tablet .. 650 milliGRAM(s) Oral every 6 hours PRN Temp greater or equal to 38C (100.4F), Mild Pain (1 - 3)    Currently Undergoing Physical/ Occupational Therapy at bedside.    Functional Status Assessment:   11/15/2021      Cognitive/Perceptual/Neuro  Level of Consciousness: confused;  alert  Arousal Level: arouses to voice  Orientation: disoriented to;  time;  situation  Speech: clear;  spontaneous  Mood/Behavior: cooperative    Language Assistance  Preferred Language to Address Healthcare Preferred Language to Address Healthcare: English    Respiratory      Respiratory  Respiratory [WDL Definition: Regular depth and pattern; unlabored; expansion symmetrical; breath sounds clear and equal bilaterally; no cough]: WDL except  Rhythm/Pattern, Respiratory: desaturation to~82% with minimal activity on 4L/min via NC, O2 increased to 6L/min with SpO2=85% with minimal exertion     Therapeutic Interventions      Bed Mobility  Bed Mobility Training Rehab Potential: good, to achieve stated therapy goals  Bed Mobility Training Rolling/Turning: minimum assist (75% patient effort);  1 person assist;  nonverbal cues (demo/gestures);  verbal cues;  bed rails  Bed Mobility Training Scooting: minimum assist (75% patient effort);  1 person assist;  verbal cues  Bed Mobility Training Sit-to-Supine: moderate assist (50% patient effort);  2 person assist  Bed Mobility Training Supine-to-Sit: moderate assist (50% patient effort);  1 person assist;  verbal cues;  nonverbal cues (demo/gestures)  Bed Mobility Training Limitations: decreased strength;  cognitive, decreased safety awareness    Sit-Stand Transfer Training  Sit-to-Stand Transfer Training Rehab Potential: good, to achieve stated therapy goals  Transfer Training Sit-to-Stand Transfer: moderate assist (50% patient effort);  2 person assist;  verbal cues;  weight-bearing as tolerated   rolling walker  Transfer Training Stand-to-Sit Transfer: moderate assist (50% patient effort);  minimum assist (75% patient effort);  verbal cues;  2 person assist;  weight-bearing as tolerated   rolling walker  Sit-to-Stand Transfer Training Transfer Safety Analysis: decreased weight-shifting ability;  decreased strength;  impaired balance;  cognitive, decreased safety awareness    Toilet Transfer Training  Toilet Transfer Training Rehab Potential: good, to achieve stated therapy goals  Transfer Training Toilet Transfer: moderate assist (50% patient effort);  2 person assist;  1 person assist;  verbal cues;  weight-bearing as tolerated   rolling walker  Toilet Transfer Training Transfer Safety Analysis: decreased weight-shifting ability;  impaired balance;  decreased strength;  cognitive, decreased safety awareness    Gait Training  Gait Training Rehab Potential: good, to achieve stated therapy goals  Gait Training: moderate assist (50% patient effort);  minimum assist (75% patient effort);  2 person assist;  verbal cues;  nonverbal cues (demo/gestures);  weight-bearing as tolerated   rolling walker;  bed to chair;  chair to bed;  + 6 side steps   Gait Analysis: increased time in double stance;  decreased jan;  shuffling;  decreased step length;  impaired balance;  cognitive, decreased safety awareness  Type of Rest Type of Rest: sitting  Duration of Rest Duration of Rest: ~5min           PM&R Impression: as above    Current Disposition Plan Recommendations:    subacute rehab placement

## 2021-11-16 NOTE — DISCHARGE NOTE PROVIDER - HOSPITAL COURSE
#Discharge: do not delete    Patient is __ yo M/F with past medical history of _____ presented with _____, found to have _____ (one liner)    Hospital course (by problem):     Patient was discharged to: (home/[facility]/acute rehab/hospice, etc, and with what services – home health PT/RN? Home O2?)    New medications:   Changes to old medications:  Medications that were stopped:    Items to follow up as outpatient:    Physical exam at the time of discharge:       #Discharge: do not delete    Patient is a 70yo M w/ PM of HTN, depression, chronic back pain who was transferred from [facility], where he was admitted on 11/2 after an unwitnessed fall (down for >1 day) and found to have acute hypoxic respiratory failure secondary to COVID-19, altered mental status and rhabdomyolysis.    Hospital course (by problem):   #Acute respiratory failure with hypoxia. Plan: Patient with AHRF 2/2 COVID-19 Pneumonia and then found to have segmental PE's. CXR and CT angio c/w COVID pna and PE. s/p Remdesivir (completed 11/8) and decadron (11/3-11/10), not a candidate for Tocilizumab, no indication for antibiotics at this time, procalcitonin negative. D dimer uptrending from 2655 to 3549 and then down to 2844 on 11/11. CXR stable. s/p HFNC with CPAP, now on nasal canula.  - continue O2 supplemental at home  O2sat at rest with 4LNC: 95%  O2sat at rest w/o O2: 89%.    #Pulmonary embolism. Plan: CTPE on 11/10: Acute PE segmental branches right upper lobe posterior segment and right lower lobe posterior basal segment. TTE with mild tricuspid regurgitation, normal right ventricle size and systolic function--no RV strain. S/p Lovenox 11/10-11/15. D dimer uptrending from 2655 to 3549 and then down to 2844 on 11/11.  - c/w Eliquis 10mg BID through 11/17 then Eliquis 5mg BID for 6 months for provoked PE    #Pneumonia due to COVID-19 virus. Plan: Patient Covid positive on 11/3, CXR with bilateral hazy opacities c/w viral COVID PNA. CT also consistent with Covid PNA. Patient s/p Remdesivir (completed 11/8) and decadron (11/3-11/10), not a candidate for Tocilizumab.  - c/w Prednisone 40mg po qd (plan to decrease to 30mg qd next week)  # Pneumonitis  In the ICU, patient started treatment for pneumonitis with solumedrol (11/11-11/15).  - management as above with steroids.    #Encephalopathy due to COVID-19 virus. Plan: Patient appeared mildly confused at times. Encephalopathy likely in setting of mixed delirium 2/2 to multiple factors (covid pneumonia, UTI, use of IV steroids).  - Psychiatry consulted for delirium, mood sx, prior concern for SI  - discontinue Mirtazapine 30mg as per Psych  - enhanced supervision inpatient, delerium precautions  - avoid use of benzoziapines and anticholinergics  - for acute agitation, can use haloperidol 2mg po/IM/VI Q6H PRN (monitor QTc) inpatient, has not required though  - no psychiatric indication to prevent discharge    #Urine retention. Plan: Patient with urinary retention likely 2/2 BPH. Patient failed TOV in MICU, posey catheter placed on 11/13.     - c/w posey for now  - c/w tamsulosin 0.4mg     #UTI  Patient with Citrobacter koseri UTI. Denies dysuria. RESOLVED  - s/p Bactrim DS BID (11/8-11/15).    #Major depression.   ·  Plan: Patient with history of major depression. Home med: Mirtazapine 30mg daily. While in the ICU, patient expressed SI and was seen by Psych. As per psych, patient is low acute risk of suicide.  They do not recommend adding any medication at this time. Per psych, no psychiatric contraindications to discharge, if depressive symptoms persist, encourage psychiatric f/u as outpt.  - as above for encephalopathy, pt's daughter to set up outpatient follow up    #Hyponatremia. Plan: Labs with iso-osmolar hyponatremia last 2 days. Na 130 today. Asymptomatic.      #Hypertension. Plan: Patient with history of hypertension. Home meds: amlodipine 10mg and losartan-HCTZ 100/25. -140's last 24hrs.  -c/w amlodipine 10mg  -c/w losartan 50 mg (f/u PCP regarding uptitrating)    #Prediabetes. Plan: Patient with history of prediabetes, A1C 5.7%. -180 in last 24hrs.  - ISS for steroid induced hyperglycemia  - monitor FSG.    #Chronic back pain. Plan; Patient with history of chronic back pain, no acute complaint during this hospitalization. Home meds: diazepam 10 mg and Narco 7.5-325mg at home.    Patient was discharged to: home    New medications:   prednisone 40mg (10 days and then follow up with Dr. Dangelo)    Changes to old medications:  - losartan-HCTZ switched to only losartan 50mg    Medications that were stopped:     Items to follow up as outpatient:  - PCP follow up    Physical exam at the time of discharge:  General: NAD, sitting up in bed  HEENT: MMM, normal oral cavity  Neck: supple, no thyromegaly  Cardiovascular: RRR, no M/R/G  Respiratory: CTA B/L ant/post; no W/R/R  Gastrointestinal: soft, NT/ND  Uro: posey in place draining yellow urine  Extremities: WWP; no edema, clubbing or cyanosis  Vascular: 2+ radial, DP pulses B/L  Neurological: AAOx3; no focal deficits     #Discharge: do not delete    Patient is a 72yo M w/ PM of HTN, depression, chronic back pain who was transferred from [facility], where he was admitted on 11/2 after an unwitnessed fall (down for >1 day) and found to have acute hypoxic respiratory failure secondary to COVID-19, altered mental status and rhabdomyolysis.    Hospital course (by problem):   #Acute respiratory failure with hypoxia. Plan: Patient with AHRF 2/2 COVID-19 Pneumonia and then found to have segmental PE's. CXR and CT angio c/w COVID pna and PE. s/p Remdesivir (completed 11/8) and decadron (11/3-11/10), not a candidate for Tocilizumab, no indication for antibiotics at this time, procalcitonin negative. D dimer uptrending from 2655 to 3549 and then down to 2844 on 11/11. CXR stable. s/p HFNC with CPAP, now on nasal canula.  - continue O2 supplemental at home  O2sat at rest with 4LNC: 95%  O2sat at rest w/o O2: 89%.    #Pulmonary embolism. Plan: CTPE on 11/10: Acute PE segmental branches right upper lobe posterior segment and right lower lobe posterior basal segment. TTE with mild tricuspid regurgitation, normal right ventricle size and systolic function--no RV strain. S/p Lovenox 11/10-11/15. D dimer uptrending from 2655 to 3549 and then down to 2844 on 11/11.  - c/w Eliquis 10mg BID through 11/17 then Eliquis 5mg BID for 6 months for provoked PE    #Pneumonia due to COVID-19 virus. Plan: Patient Covid positive on 11/3, CXR with bilateral hazy opacities c/w viral COVID PNA. CT also consistent with Covid PNA. Patient s/p Remdesivir (completed 11/8) and decadron (11/3-11/10), not a candidate for Tocilizumab.  - c/w Prednisone 40mg po qd (plan to decrease to 30mg qd next week)  # Pneumonitis  In the ICU, patient started treatment for pneumonitis with solumedrol (11/11-11/15).  - management as above with steroids.    #Encephalopathy due to COVID-19 virus. Plan: Patient appeared mildly confused at times. Encephalopathy likely in setting of mixed delirium 2/2 to multiple factors (covid pneumonia, UTI, use of IV steroids).  - Psychiatry consulted for delirium, mood sx, prior concern for SI  - discontinue Mirtazapine 30mg as per Psych  - enhanced supervision inpatient, delerium precautions  - avoid use of benzoziapines and anticholinergics  - for acute agitation, can use haloperidol 2mg po/IM/VI Q6H PRN (monitor QTc) inpatient, has not required though  - no psychiatric indication to prevent discharge    #Urine retention. Plan: Patient with urinary retention likely 2/2 BPH. Patient failed TOV in MICU, posey catheter placed on 11/13.     - c/w posey for now  - c/w tamsulosin 0.4mg     #UTI  Patient with Citrobacter koseri UTI. Denies dysuria. RESOLVED  - s/p Bactrim DS BID (11/8-11/15).    #Major depression.   ·  Plan: Patient with history of major depression. Home med: Mirtazapine 30mg daily. While in the ICU, patient expressed SI and was seen by Psych. As per psych, patient is low acute risk of suicide.  They do not recommend adding any medication at this time. Per psych, no psychiatric contraindications to discharge, if depressive symptoms persist, encourage psychiatric f/u as outpt.  - as above for encephalopathy, pt's daughter to set up outpatient follow up    #Hyponatremia. Plan: Labs with iso-osmolar hyponatremia last 2 days. Na 130 today. Asymptomatic.      #Hypertension. Plan: Patient with history of hypertension. Home meds: amlodipine 10mg and losartan-HCTZ 100/25. -140's last 24hrs.  -c/w amlodipine 10mg  -c/w losartan 50 mg (f/u PCP regarding uptitrating)    #Prediabetes. Plan: Patient with history of prediabetes, A1C 5.7%. -180 in last 24hrs.  - ISS for steroid induced hyperglycemia  - monitor FSG.    #Chronic back pain. Plan; Patient with history of chronic back pain, no acute complaint during this hospitalization. Home meds: diazepam 10 mg and Narco 7.5-325mg at home.    Patient was discharged to: home    New medications:   prednisone 40mg (10 days and then follow up with Dr. Dangelo)    Changes to old medications:  - losartan-HCTZ switched to only losartan 50mg    Medications that were stopped: mirtazepine    Items to follow up as outpatient:  - PCP follow up    Physical exam at the time of discharge:  General: NAD, sitting up in bed  HEENT: MMM, normal oral cavity  Neck: supple, no thyromegaly  Cardiovascular: RRR, no M/R/G  Respiratory: CTA B/L ant/post; no W/R/R  Gastrointestinal: soft, NT/ND  Uro: posey in place draining yellow urine  Extremities: WWP; no edema, clubbing or cyanosis  Vascular: 2+ radial, DP pulses B/L  Neurological: AAOx3; no focal deficits     #Discharge: do not delete    Patient is a 72yo M w/ PM of HTN, depression, chronic back pain who was transferred from [facility], where he was admitted on 11/2 after an unwitnessed fall (down for >1 day) and found to have acute hypoxic respiratory failure secondary to COVID-19, altered mental status and rhabdomyolysis.    Hospital course (by problem):   #Acute respiratory failure with hypoxia. Plan: Patient with AHRF 2/2 COVID-19 Pneumonia and then found to have segmental PE's. CXR and CT angio c/w COVID pna and PE. s/p Remdesivir (completed 11/8) and decadron (11/3-11/10), not a candidate for Tocilizumab, no indication for antibiotics at this time, procalcitonin negative. D dimer uptrending from 2655 to 3549 and then down to 2844 on 11/11. CXR stable. s/p HFNC with CPAP, now on nasal canula.  - continue O2 supplemental at home  O2sat at rest with 4LNC: 95%  O2sat at rest w/o O2: 89%.    #Pulmonary embolism. Plan: CTPE on 11/10: Acute PE segmental branches right upper lobe posterior segment and right lower lobe posterior basal segment. TTE with mild tricuspid regurgitation, normal right ventricle size and systolic function--no RV strain. S/p Lovenox 11/10-11/15. D dimer uptrending from 2655 to 3549 and then down to 2844 on 11/11.  - c/w Eliquis 10mg BID through 11/17 then Eliquis 5mg BID for 6 months for provoked PE    #Pneumonia due to COVID-19 virus. Plan: Patient Covid positive on 11/3, CXR with bilateral hazy opacities c/w viral COVID PNA. CT also consistent with Covid PNA. Patient s/p Remdesivir (completed 11/8) and decadron (11/3-11/10), not a candidate for Tocilizumab.  - c/w Prednisone 40mg po qd (plan to decrease to 30mg qd next week)  # Pneumonitis  In the ICU, patient started treatment for pneumonitis with solumedrol (11/11-11/15).  - management as above with steroids.    #Encephalopathy due to COVID-19 virus. Plan: Patient appeared mildly confused at times. Encephalopathy likely in setting of mixed delirium 2/2 to multiple factors (covid pneumonia, UTI, use of IV steroids).  - Psychiatry consulted for delirium, mood sx, prior concern for SI  - discontinue Mirtazapine 30mg as per Psych  - enhanced supervision inpatient, delerium precautions  - avoid use of benzoziapines and anticholinergics  - for acute agitation, can use haloperidol 2mg po/IM/VI Q6H PRN (monitor QTc) inpatient, has not required though  - no psychiatric indication to prevent discharge    #Urine retention. Plan: Patient with urinary retention likely 2/2 BPH. Patient failed TOV in MICU, posey catheter placed on 11/13.     - c/w posey for now  - c/w tamsulosin 0.4mg     #UTI  Patient with Citrobacter koseri UTI. Denies dysuria. RESOLVED  - s/p Bactrim DS BID (11/8-11/15).    #Major depression.   ·  Plan: Patient with history of major depression. Home med: Mirtazapine 30mg daily. While in the ICU, patient expressed SI and was seen by Psych. As per psych, patient is low acute risk of suicide.  They do not recommend adding any medication at this time. Per psych, no psychiatric contraindications to discharge, if depressive symptoms persist, encourage psychiatric f/u as outpt.  - as above for encephalopathy, pt's daughter to set up outpatient follow up    #Hyponatremia. Plan: Labs with iso-osmolar hyponatremia last 2 days. Na 130 today. Asymptomatic.      #Hypertension. Plan: Patient with history of hypertension. Home meds: amlodipine 10mg and losartan-HCTZ 100/25. -140's last 24hrs.  -c/w amlodipine 10mg  -c/w losartan 50 mg (f/u PCP regarding uptitrating)    #Prediabetes. Plan: Patient with history of prediabetes, A1C 5.7%. -180 in last 24hrs.  - ISS for steroid induced hyperglycemia  - monitor FSG.    #Chronic back pain. Plan; Patient with history of chronic back pain, no acute complaint during this hospitalization. Home meds: diazepam 10 mg and Narco 7.5-325mg at home.    Patient was discharged to: home    New medications:   prednisone 40mg (10 days and then follow up with Dr. Dangelo)  eliquis 5mg BID for 6 months  tamsulosin    Changes to old medications:  - losartan-HCTZ switched to only losartan 50mg    Medications that were stopped: mirtazepine    Items to follow up as outpatient:  - PCP follow up    Physical exam at the time of discharge:  General: NAD, sitting up in bed  HEENT: MMM, normal oral cavity  Neck: supple, no thyromegaly  Cardiovascular: RRR, no M/R/G  Respiratory: CTA B/L ant/post; no W/R/R  Gastrointestinal: soft, NT/ND  Uro: posey in place draining yellow urine  Extremities: WWP; no edema, clubbing or cyanosis  Vascular: 2+ radial, DP pulses B/L  Neurological: AAOx3; no focal deficits     #Discharge: do not delete    Patient is a 70yo M w/ PM of HTN, depression, chronic back pain who was transferred from [facility], where he was admitted on 11/2 after an unwitnessed fall (down for >1 day) and found to have acute hypoxic respiratory failure secondary to COVID-19, altered mental status and rhabdomyolysis.    Hospital course (by problem):   #Acute respiratory failure with hypoxia. Plan: Patient with AHRF 2/2 COVID-19 Pneumonia and then found to have segmental PE's. CXR and CT angio c/w COVID pna and PE. s/p Remdesivir (completed 11/8) and decadron (11/3-11/10), not a candidate for Tocilizumab, no indication for antibiotics at this time, procalcitonin negative. D dimer uptrending from 2655 to 3549 and then down to 2844 on 11/11. CXR stable. s/p HFNC with CPAP, now on nasal canula.  - continue O2 supplemental at home  O2sat at rest with 4LNC: 95%  O2sat at rest w/o O2: 89%.    #Pulmonary embolism. Plan: CTPE on 11/10: Acute PE segmental branches right upper lobe posterior segment and right lower lobe posterior basal segment. TTE with mild tricuspid regurgitation, normal right ventricle size and systolic function--no RV strain. S/p Lovenox 11/10-11/15. D dimer uptrending from 2655 to 3549 and then down to 2844 on 11/11.  - c/w Eliquis 10mg BID for a 7 day course, then Eliquis 5mg BID for 6 months for provoked PE    #Pneumonia due to COVID-19 virus. Plan: Patient Covid positive on 11/3, CXR with bilateral hazy opacities c/w viral COVID PNA. CT also consistent with Covid PNA. Patient s/p Remdesivir (completed 11/8) and decadron (11/3-11/10), not a candidate for Tocilizumab.  - c/w Prednisone 40mg po qd (plan to decrease to 30mg qd next week)  # Pneumonitis  In the ICU, patient started treatment for pneumonitis with solumedrol (11/11-11/15).  - management as above with steroids.    #Encephalopathy due to COVID-19 virus. Plan: Patient appeared mildly confused at times. Encephalopathy likely in setting of mixed delirium 2/2 to multiple factors (covid pneumonia, UTI, use of IV steroids).  - Psychiatry consulted for delirium, mood sx, prior concern for SI  - discontinue Mirtazapine 30mg as per Psych  - enhanced supervision inpatient, delerium precautions  - avoid use of benzoziapines and anticholinergics  - for acute agitation, can use haloperidol 2mg po/IM/VI Q6H PRN (monitor QTc) inpatient, has not required though  - no psychiatric indication to prevent discharge    #Urine retention. Plan: Patient with urinary retention likely 2/2 BPH. Patient failed TOV in MICU, posey catheter placed on 11/13.     - c/w posey for now  - c/w tamsulosin 0.4mg     #UTI  Patient with Citrobacter koseri UTI. Denies dysuria. RESOLVED  - s/p Bactrim DS BID (11/8-11/15).    #Major depression.   ·  Plan: Patient with history of major depression. Home med: Mirtazapine 30mg daily. While in the ICU, patient expressed SI and was seen by Psych. As per psych, patient is low acute risk of suicide.  They do not recommend adding any medication at this time. Per psych, no psychiatric contraindications to discharge, if depressive symptoms persist, encourage psychiatric f/u as outpt.  - as above for encephalopathy, pt's daughter to set up outpatient follow up    #Hyponatremia. Plan: Labs with iso-osmolar hyponatremia last 2 days. Na 130 today. Asymptomatic.      #Hypertension. Plan: Patient with history of hypertension. Home meds: amlodipine 10mg and losartan-HCTZ 100/25. -140's last 24hrs.  -c/w amlodipine 10mg  -c/w losartan 50 mg (f/u PCP regarding uptitrating)    #Prediabetes. Plan: Patient with history of prediabetes, A1C 5.7%. -180 in last 24hrs.  - ISS for steroid induced hyperglycemia  - monitor FSG.    #Chronic back pain. Plan; Patient with history of chronic back pain, no acute complaint during this hospitalization. Home meds: diazepam 10 mg and Narco 7.5-325mg at home.    Patient was discharged to: home    New medications:   prednisone 40mg, then Prednisione 30mg   Eliquis 5mg BID for 6 months  Losartan 50  tamsulosin    Changes to old medications:  - losartan-HCTZ switched to only losartan 50mg    Medications that were stopped: mirtazepine    Items to follow up as outpatient:  - PCP follow up    Physical exam at the time of discharge:  General: NAD, sitting up in bed  HEENT: MMM, normal oral cavity  Neck: supple, no thyromegaly  Cardiovascular: RRR, no M/R/G  Respiratory: CTA B/L ant/post; no W/R/R  Gastrointestinal: soft, NT/ND  Uro: posey in place draining yellow urine  Extremities: WWP; no edema, clubbing or cyanosis  Vascular: 2+ radial, DP pulses B/L  Neurological: AAOx3; no focal deficits     #Discharge: do not delete    Patient is a 72yo M w/ PM of HTN, depression, chronic back pain who was transferred from [facility], where he was admitted on 11/2 after an unwitnessed fall (down for >1 day) and found to have acute hypoxic respiratory failure secondary to COVID-19, altered mental status and rhabdomyolysis.    Hospital course (by problem):   #Acute respiratory failure with hypoxia. Plan: Patient with AHRF 2/2 COVID-19 Pneumonia and then found to have segmental PE's. CXR and CT angio c/w COVID pna and PE. s/p Remdesivir (completed 11/8) and decadron (11/3-11/10), not a candidate for Tocilizumab, no indication for antibiotics at this time, procalcitonin negative. D dimer uptrending from 2655 to 3549 and then down to 2844 on 11/11. CXR stable. s/p HFNC with CPAP, now on nasal canula.  - continue O2 supplemental at home  O2sat at rest with 4LNC: 95%  O2sat at rest w/o O2: 89%.    #Pulmonary embolism. Plan: CTPE on 11/10: Acute PE segmental branches right upper lobe posterior segment and right lower lobe posterior basal segment. TTE with mild tricuspid regurgitation, normal right ventricle size and systolic function--no RV strain. S/p Lovenox 11/10-11/15. D dimer uptrending from 2655 to 3549 and then down to 2844 on 11/11.  - c/w Eliquis 10mg BID for a 7 day course, then Eliquis 5mg BID for 6 months for provoked PE    #Pneumonia due to COVID-19 virus. Plan: Patient Covid positive on 11/3, CXR with bilateral hazy opacities c/w viral COVID PNA. CT also consistent with Covid PNA. Patient s/p Remdesivir (completed 11/8) and decadron (11/3-11/10), not a candidate for Tocilizumab.  - c/w Prednisone 40mg po qd (plan to decrease to 30mg qd next week)  # Pneumonitis  In the ICU, patient started treatment for pneumonitis with solumedrol (11/11-11/15).  - management as above with steroids.  -Pt tested positive for Covid on 11/3, now asymptomatic and out of isolation. Pt is asymptomatic from Covid infection and tested negative on PCR 11/17. Current O2 requirements on 3L NC saturating 97-98%.     #Encephalopathy due to COVID-19 virus. Plan: Patient appeared mildly confused at times. Encephalopathy likely in setting of mixed delirium 2/2 to multiple factors (covid pneumonia, UTI, use of IV steroids).  - Psychiatry consulted for delirium, mood sx, prior concern for SI  - discontinue Mirtazapine 30mg as per Psych  - enhanced supervision inpatient, delerium precautions  - avoid use of benzoziapines and anticholinergics  - for acute agitation, can use haloperidol 2mg po/IM/VI Q6H PRN (monitor QTc) inpatient, has not required though  - no psychiatric indication to prevent discharge    #Urine retention. Plan: Patient with urinary retention likely 2/2 BPH. Patient failed TOV in MICU, posey catheter placed on 11/13.     - c/w posey for now  - c/w tamsulosin 0.4mg     #UTI  Patient with Citrobacter koseri UTI. Denies dysuria. RESOLVED  - s/p Bactrim DS BID (11/8-11/15).    #Major depression.   ·  Plan: Patient with history of major depression. Home med: Mirtazapine 30mg daily. While in the ICU, patient expressed SI and was seen by Psych. As per psych, patient is low acute risk of suicide.  They do not recommend adding any medication at this time. Per psych, no psychiatric contraindications to discharge, if depressive symptoms persist, encourage psychiatric f/u as outpt.  - as above for encephalopathy, pt's daughter to set up outpatient follow up    #Hyponatremia. Plan: Labs with iso-osmolar hyponatremia last 2 days. Na 130 today. Asymptomatic.      #Hypertension. Plan: Patient with history of hypertension. Home meds: amlodipine 10mg and losartan-HCTZ 100/25. -140's last 24hrs.  -c/w amlodipine 10mg  -c/w losartan 50 mg (f/u PCP regarding uptitrating)    #Prediabetes. Plan: Patient with history of prediabetes, A1C 5.7%. -180 in last 24hrs.  - ISS for steroid induced hyperglycemia  - monitor FSG.    #Chronic back pain. Plan; Patient with history of chronic back pain, no acute complaint during this hospitalization. Home meds: diazepam 10 mg and Narco 7.5-325mg at home.    Patient was discharged to: home    New medications:   prednisone 40mg, then Prednisione 30mg   Eliquis 5mg BID for 6 months  Losartan 50  tamsulosin    Changes to old medications:  - losartan-HCTZ switched to only losartan 50mg    Medications that were stopped: mirtazepine    Items to follow up as outpatient:  - PCP follow up    Physical exam at the time of discharge:  General: NAD, sitting up in bed  HEENT: MMM, normal oral cavity  Neck: supple, no thyromegaly  Cardiovascular: RRR, no M/R/G  Respiratory: CTA B/L ant/post; no W/R/R  Gastrointestinal: soft, NT/ND  Uro: posey in place draining yellow urine  Extremities: WWP; no edema, clubbing or cyanosis  Vascular: 2+ radial, DP pulses B/L  Neurological: AAOx3; no focal deficits

## 2021-11-16 NOTE — PROGRESS NOTE ADULT - ASSESSMENT
Patient is a 70yo M w/ PM of HTN, depression, chronic back pain who was transferred from [facility], where he was admitted on 11/2 after an unwitnessed fall (down for >1 day) and found to have acute hypoxic respiratory failure secondary to COVID-19, altered mental status and rhabdomyolysis. Due to increased work of breathing and oxygen requirements patient was upgraded to MICU for closer monitoring. Stable on 6LNC and now monitoring RMF. Plan for discharge tomorrow with home O2 and pending TOV.

## 2021-11-16 NOTE — PROGRESS NOTE ADULT - PROBLEM SELECTOR PLAN 9
Patient with history of prediabetes, A1C 5.7%. -180 in last 24hrs.  - ISS for steroid induced hyperglycemia  - monitor FSG

## 2021-11-16 NOTE — PROGRESS NOTE ADULT - PROBLEM SELECTOR PLAN 8
Patient with history of hypertension. Home meds: amlodipine 10mg and losartan-HCTZ 100/25. -140's last 24hrs.  -c/w amlodipine 10mg  -c/w losartan 50 mg (uptitrate as appropriate to home dose)

## 2021-11-16 NOTE — BH CONSULTATION LIAISON PROGRESS NOTE - NSBHADMITCOUNSEL_PSY_A_CORE
diagnostic results/impressions and/or recommended studies/risks and benefits of treatment options/instructions for management, treatment and follow up/importance of adherence to chosen treatment/risk factor reduction/client/family/caregiver education
diagnostic results/impressions and/or recommended studies/instructions for management, treatment and follow up/importance of adherence to chosen treatment/risk factor reduction/client/family/caregiver education

## 2021-11-16 NOTE — PROGRESS NOTE ADULT - PROBLEM SELECTOR PLAN 2
CTPE on 11/10: Acute PE segmental branches right upper lobe posterior segment and right lower lobe posterior basal segment. TTE with mild tricuspid regurgitation, normal right ventricle size and systolic function--no RV strain. S/p Lovenox 11/10-11/15. D dimer uptrending from 2655 to 3549 and then down to 2844 on 11/11.  - c/w Eliquis 10mg BID through 11/17 then Eliquis 5mg BID for 6 months

## 2021-11-16 NOTE — BH CONSULTATION LIAISON PROGRESS NOTE - NSBHCONSFOLLOWNEEDS_PSY_ALL_CORE
Needs further psych safety assessment prior to discharge
No psychiatric contraindications to discharge

## 2021-11-16 NOTE — BH CONSULTATION LIAISON PROGRESS NOTE - NSBHCHARTREVIEWLAB_PSY_A_CORE FT
16.5   16.75 )-----------( 266      ( 15 Nov 2021 08:03 )             50.4     11-15    130<L>  |  100  |  26<H>  ----------------------------<  104<H>  See Note   |  20<L>  |  0.78    Ca    9.8      15 Nov 2021 08:03  Phos  2.7     11-15  Mg     2.3     11-15    TPro  6.5  /  Alb  2.9<L>  /  TBili  0.6  /  DBili  x   /  AST  31  /  ALT  29  /  AlkPhos  76  11-14  
                      15.0   15.14 )-----------( 289      ( 16 Nov 2021 08:21 )             45.4     11-16    130<L>  |  98  |  24<H>  ----------------------------<  108<H>  4.4   |  21<L>  |  0.82    Ca    9.6      16 Nov 2021 08:21  Phos  2.6     11-16  Mg     2.2     11-16

## 2021-11-16 NOTE — BH CONSULTATION LIAISON PROGRESS NOTE - OTHER
attentive, impaired concentration but improved from prior grossly coherent, occasional confused content  motor exam deferred due to infectious precautions at times difficult to understand but improved from prior more reactive than prior stable posture in bed, gait not assessed no abnormalities during interview, recently impaired fair judgment based on adherence with care in hospital. Limited insight into reason for hospital care some mildly confused content, no obvious depressive or delusional content. no current SI or HI

## 2021-11-16 NOTE — PROGRESS NOTE ADULT - PROBLEM SELECTOR PLAN 4
Patient appeared mildly confused at times. Encephalopathy likely in setting of mixed delirium 2/2 to multiple factors (covid pneumonia, UTI, use of IV steroids).  - Continue to monitor  - Psychiatry consulted for delirium, mood sx, prior concern for SI  - discontinue Mirtazapine 30mg as per Psych  - 1:1 observation for increased risk of advertent and inadvertent self-harm given reported fluctuation in MS  - avoid use of benzoziapines and anticholinergics  - for acute agitation, can use haloperidol 2mg po/IM/VI Q6H PRN (monitor QTc)  - delirium precautions

## 2021-11-16 NOTE — DISCHARGE NOTE PROVIDER - NSDCACTIVITY_GEN_ALL_CORE
Bathing allowed/Do not drive or operate machinery/Walking - Indoors allowed/Walking - Outdoors allowed

## 2021-11-16 NOTE — BH CONSULTATION LIAISON PROGRESS NOTE - CURRENT MEDICATION
MEDICATIONS  (STANDING):  amLODIPine   Tablet 10 milliGRAM(s) Oral every 24 hours  apixaban 5 milliGRAM(s) Oral every 12 hours  chlorhexidine 2% Cloths 1 Application(s) Topical <User Schedule>  insulin lispro (ADMELOG) corrective regimen sliding scale   SubCutaneous Before meals and at bedtime  losartan 50 milliGRAM(s) Oral daily  mirtazapine 30 milliGRAM(s) Oral at bedtime  multivitamin 1 Tablet(s) Oral daily  pantoprazole    Tablet 40 milliGRAM(s) Oral before breakfast  polyethylene glycol 3350 17 Gram(s) Oral every 12 hours  predniSONE   Tablet 40 milliGRAM(s) Oral daily  senna 2 Tablet(s) Oral at bedtime  tamsulosin 0.4 milliGRAM(s) Oral at bedtime  trimethoprim  160 mG/sulfamethoxazole 800 mG 1 Tablet(s) Oral every 12 hours    MEDICATIONS  (PRN):  acetaminophen     Tablet .. 650 milliGRAM(s) Oral every 6 hours PRN Temp greater or equal to 38C (100.4F), Mild Pain (1 - 3)  
MEDICATIONS  (STANDING):  amLODIPine   Tablet 10 milliGRAM(s) Oral every 24 hours  apixaban 10 milliGRAM(s) Oral every 12 hours  insulin lispro (ADMELOG) corrective regimen sliding scale   SubCutaneous Before meals and at bedtime  losartan 50 milliGRAM(s) Oral daily  multivitamin 1 Tablet(s) Oral daily  pantoprazole    Tablet 40 milliGRAM(s) Oral before breakfast  polyethylene glycol 3350 17 Gram(s) Oral every 12 hours  predniSONE   Tablet 40 milliGRAM(s) Oral daily  senna 2 Tablet(s) Oral at bedtime  tamsulosin 0.4 milliGRAM(s) Oral at bedtime    MEDICATIONS  (PRN):  acetaminophen     Tablet .. 650 milliGRAM(s) Oral every 6 hours PRN Temp greater or equal to 38C (100.4F), Mild Pain (1 - 3)

## 2021-11-16 NOTE — SWALLOW VFSS/MBS ASSESSMENT ADULT - DIAGNOSTIC IMPRESSIONS
Pt p/w an essentially functional oropharyngeal swallow without penetration, aspiration or pharyngeal residue after the swallow.

## 2021-11-16 NOTE — BH CONSULTATION LIAISON PROGRESS NOTE - ORIENTATION
partially oriented to location (medical setting but not specific location), date (month/year with prompting), confused about details of situation

## 2021-11-16 NOTE — SWALLOW VFSS/MBS ASSESSMENT ADULT - MODE OF PRESENTATION
cup/spoon/self fed tsp thin x1; cup sips thin x4; tsp puree x1, soft & bite-sized x1/cup/spoon/self fed

## 2021-11-16 NOTE — PROGRESS NOTE ADULT - ASSESSMENT
per Internal Medicine    72 yo M w/ PM of HTN, depression, chronic back pain who was transferred from [facility], where he was admitted on 11/2 after an unwitnessed fall (down for >1 day) and found to have acute hypoxic respiratory failure secondary to COVID-19, altered mental status and rhabdomyolysis. Due to increased work of breathing and oxygen requirements patient was upgraded to MICU for closer monitoring. Stable on 6LNC and now monitoring RMF. Plan for discharge tomorrow with home O2 and pending TOV.    Problem/Plan - 1:  ·  Problem: Acute respiratory failure with hypoxia.   ·  Plan: Patient with AHRF 2/2 COVID-19 Pneumonia and then found to have segmental PE's. CXR and CT angio c/w COVID pna and PE. s/p Remdesivir (completed 11/8) and decadron (11/3-11/10), not a candidate for Tocilizumab, no indication for antibiotics at this time, procalcitonin negative. D dimer uptrending from 2655 to 3549 and then down to 2844 on 11/11. CXR stable. s/p HFNC with CPAP, now on nasal canula.  - continue to wean oxygen as tolerated, currently on NC6L, goal to wean to 4L  - follow up ambulatory O2 requirements    O2sat at rest with 4LNC: 95%  O2sat at rest w/o O2: 89%.    Problem/Plan - 2:  ·  Problem: Pulmonary embolism.   ·  Plan: CTPE on 11/10: Acute PE segmental branches right upper lobe posterior segment and right lower lobe posterior basal segment. TTE with mild tricuspid regurgitation, normal right ventricle size and systolic function--no RV strain. S/p Lovenox 11/10-11/15. D dimer uptrending from 2655 to 3549 and then down to 2844 on 11/11.  - c/w Eliquis 10mg BID through 11/17 then Eliquis 5mg BID for 6 months.    Problem/Plan - 3:  ·  Problem: Pneumonia due to COVID-19 virus.   ·  Plan: Patient Covid positive on 11/3, CXR with bilateral hazy opacities c/w viral COVID PNA. CT also consistent with Covid PNA. Patient s/p Remdesivir (completed 11/8) and decadron (11/3-11/10), not a candidate for Tocilizumab.  - c/w Prednisone 40mg po qd (11/15- ) for prolonged taper (2wks)  - Currently on 6L NC, continue to wean as tolerated, goal 4LNC inpatient    # Pneumonitis  In the ICU, patient started treatment for pneumonitis with solumedrol (11/11-11/15).  - management as above with steroids.    Problem/Plan - 4:  ·  Problem: Encephalopathy due to COVID-19 virus.   ·  Plan: Patient appeared mildly confused at times. Encephalopathy likely in setting of mixed delirium 2/2 to multiple factors (covid pneumonia, UTI, use of IV steroids).  - Continue to monitor  - Psychiatry consulted for delirium, mood sx, prior concern for SI  - discontinue Mirtazapine 30mg as per Psych  - 1:1 observation for increased risk of advertent and inadvertent self-harm given reported fluctuation in MS  - avoid use of benzoziapines and anticholinergics  - for acute agitation, can use haloperidol 2mg po/IM/VI Q6H PRN (monitor QTc)  - delirium precautions.    Problem/Plan - 5:  ·  Problem: Urine retention.   ·  Plan: Patient with urinary retention likely 2/2 BPH. Patient failed TOV in MICU, posey catheter placed on 11/13.     - c/w posey for now  - TOV today  - c/w tamsulosin 0.4mg     #UTI  Patient with Citrobacter koseri UTI. Denies dysuria. RESOLVED  - s/p Bactrim DS BID (11/8-11/15).    Problem/Plan - 6:  ·  Problem: Major depression.   ·  Plan: Patient with history of major depression. Home med: Mirtazapine 30mg daily. While in the ICU, patient expressed SI and was seen by Psych. As per psych, patient is low acute risk of suicide.  They do not recommend adding any medication at this time. Per psych, no psychiatric contraindications to discharge, if depressive symptoms persist, encourage psychiatric f/u as outpt.  - Psychiatry consulted  - discontinue Mirtazapine 30mg as per Psych  - 1:1 observation for increased risk of advertent and inadvertent self-harm given reported fluctuation in MS  - avoid use of benzoziapines and anticholinergics  - for acute agitation, can use haloperidol 2mg po/IM/VI Q6H PRN (monitor QTc)  - delirium precautions.    Problem/Plan - 7:  ·  Problem: Hyponatremia.   ·  Plan: Labs with iso-osmolar hyponatremia last 2 days. Na 130 today.  - monitor  - monitor fluid status.    Problem/Plan - 8:  ·  Problem: Hypertension.   ·  Plan: Patient with history of hypertension. Home meds: amlodipine 10mg and losartan-HCTZ 100/25. -140's last 24hrs.  -c/w amlodipine 10mg  -c/w losartan 50 mg (uptitrate as appropriate to home dose).    Problem/Plan - 9:  ·  Problem: Prediabetes.   ·  Plan: Patient with history of prediabetes, A1C 5.7%. -180 in last 24hrs.  - ISS for steroid induced hyperglycemia  - monitor FSG.    Problem/Plan - 10:  ·  Problem: Chronic back pain.   ·  Plan; Patient with history of chronic back pain, no acute complaint during this hospitalization. Home meds: diazepam 10 mg and Narco 7.5-325mg at home.  - HOLD home meds due to somnolence  - avoid opioids for pain due to risk of oversedation.    Problem/Plan - 11:  ·  Problem: Prophylactic measure.   ·  Plan: F: IVF as appropriate  E: replete K<4, Mg<2 as appropriate  N: mince and moist, mildly thick liquids as per S/S    VTE Prophylaxis: eliquis  GI: not needed  C: Full Code

## 2021-11-16 NOTE — PROGRESS NOTE ADULT - SUBJECTIVE AND OBJECTIVE BOX
INTERVAL EVENTS: SARA    SUBJECTIVE / INTERVAL HPI: Patient seen and examined at bedside. Patient reports he feels "okay". Does not feel any different than yesterday. He feels he is breathing comfortably this morning. He endorses he will take it slow and hope for the best. Denies fever, nausea, vomiting, CP, palpitations, abd pain, diarrhea. Notes some urethral discomfort from the posey.    ROS: negative unless otherwise stated above.    VITAL SIGNS:  Vital Signs Last 24 Hrs  T(C): 37 (16 Nov 2021 05:24), Max: 37 (16 Nov 2021 05:24)  T(F): 98.6 (16 Nov 2021 05:24), Max: 98.6 (16 Nov 2021 05:24)  HR: 88 (16 Nov 2021 05:24) (86 - 96)  BP: 112/76 (16 Nov 2021 05:24) (110/73 - 140/87)  BP(mean): 90 (15 Nov 2021 15:40) (86 - 106)  RR: 18 (16 Nov 2021 05:24) (16 - 20)  SpO2: 96% (16 Nov 2021 05:24) (90% - 100%)      11-15-21 @ 07:01  -  11-16-21 @ 07:00  --------------------------------------------------------  IN: 0 mL / OUT: 325 mL / NET: -325 mL        PHYSICAL EXAM:    General: NAD, sitting up in bed  HEENT: MMM, normal oral cavity  Neck: supple, no thyromegaly  Cardiovascular: RRR, no M/R/G  Respiratory: CTA B/L ant/post; no W/R/R  Gastrointestinal: soft, NT/ND  Uro: posey in place draining yellow urine  Extremities: WWP; no edema, clubbing or cyanosis  Vascular: 2+ radial, DP pulses B/L  Neurological: AAOx3; no focal deficits    MEDICATIONS:  MEDICATIONS  (STANDING):  amLODIPine   Tablet 10 milliGRAM(s) Oral every 24 hours  apixaban 10 milliGRAM(s) Oral every 12 hours  insulin lispro (ADMELOG) corrective regimen sliding scale   SubCutaneous Before meals and at bedtime  losartan 50 milliGRAM(s) Oral daily  multivitamin 1 Tablet(s) Oral daily  pantoprazole    Tablet 40 milliGRAM(s) Oral before breakfast  polyethylene glycol 3350 17 Gram(s) Oral every 12 hours  predniSONE   Tablet 40 milliGRAM(s) Oral daily  senna 2 Tablet(s) Oral at bedtime  tamsulosin 0.4 milliGRAM(s) Oral at bedtime    MEDICATIONS  (PRN):  acetaminophen     Tablet .. 650 milliGRAM(s) Oral every 6 hours PRN Temp greater or equal to 38C (100.4F), Mild Pain (1 - 3)      ALLERGIES:  Allergies    Cipro (Rash)    Intolerances    Cipro (Other)      LABS:                        16.5   16.75 )-----------( 266      ( 15 Nov 2021 08:03 )             50.4     11-15    131<L>  |  99  |  27<H>  ----------------------------<  120<H>  4.7   |  25  |  0.77    Ca    10.0      15 Nov 2021 17:18  Phos  2.7     11-15  Mg     2.3     11-15          CAPILLARY BLOOD GLUCOSE      POCT Blood Glucose.: 159 mg/dL (15 Nov 2021 21:31)      RADIOLOGY & ADDITIONAL TESTS: Reviewed. HOSPITAL COURSE: 71 year old male with PMx of HTN, depression, chronic back pain who was transferred from [facility], where he was admitted on 11/2 after an unwitnessed fall (down for >1 day), on arrival at [facility], patient found to have acute hypoxic respiratory failure secondary to COVID-19 pneumonia (not vaccinated for COVID), altered mental status, and rhabdomyolyis. Patient was started on Remdesivir (completed 11/8) and decadron (10/3-10/10).  Pt started having increased work of breathing and O2 requirements on 11/6 and he was subsequently transferred to MICU for further management. In the MICU, he was placed on BiPAP and had to be straight catheterized multiple times due to urine retention. Urine culture was positive for citrobacter, he was started on ceftriaxone switch to bactrim and a posey was placed in. He was eventually weaned off to HFNC with intermittent cPAP. He was further weaned to NC 3L and was saturating 93%. However, on 11/10 he started desaturating to around 85%, was placed on CPAP and improved to 94%. A CTPE showed  Acute PE segmental branches right upper lobe posterior segment and right lower lobe posterior basal segment. He was started on full dose anticoagulation but he failed TOV multiple times. In the ICU patient was started on 30mg Methylprednisolone, now transitioned to 40mg Prednisone for prolonged taper. The HFNC was de escalated to NC to 6 L and was stepped down to RMF. Pending TOV and stabilization on 4LNC before discharge.    INTERVAL EVENTS: SARA    SUBJECTIVE / INTERVAL HPI: Patient seen and examined at bedside. Patient reports he feels "okay". Does not feel any different than yesterday. He feels he is breathing comfortably this morning. He endorses he will take it slow and hope for the best. Denies fever, nausea, vomiting, CP, palpitations, abd pain, diarrhea. Notes some urethral discomfort from the posey.    ROS: negative unless otherwise stated above.    VITAL SIGNS:  Vital Signs Last 24 Hrs  T(C): 37 (16 Nov 2021 05:24), Max: 37 (16 Nov 2021 05:24)  T(F): 98.6 (16 Nov 2021 05:24), Max: 98.6 (16 Nov 2021 05:24)  HR: 88 (16 Nov 2021 05:24) (86 - 96)  BP: 112/76 (16 Nov 2021 05:24) (110/73 - 140/87)  BP(mean): 90 (15 Nov 2021 15:40) (86 - 106)  RR: 18 (16 Nov 2021 05:24) (16 - 20)  SpO2: 96% (16 Nov 2021 05:24) (90% - 100%)      11-15-21 @ 07:01  -  11-16-21 @ 07:00  --------------------------------------------------------  IN: 0 mL / OUT: 325 mL / NET: -325 mL        PHYSICAL EXAM:    General: NAD, sitting up in bed  HEENT: MMM, normal oral cavity  Neck: supple, no thyromegaly  Cardiovascular: RRR, no M/R/G  Respiratory: CTA B/L ant/post; no W/R/R  Gastrointestinal: soft, NT/ND  Uro: posey in place draining yellow urine  Extremities: WWP; no edema, clubbing or cyanosis  Vascular: 2+ radial, DP pulses B/L  Neurological: AAOx3; no focal deficits    MEDICATIONS:  MEDICATIONS  (STANDING):  amLODIPine   Tablet 10 milliGRAM(s) Oral every 24 hours  apixaban 10 milliGRAM(s) Oral every 12 hours  insulin lispro (ADMELOG) corrective regimen sliding scale   SubCutaneous Before meals and at bedtime  losartan 50 milliGRAM(s) Oral daily  multivitamin 1 Tablet(s) Oral daily  pantoprazole    Tablet 40 milliGRAM(s) Oral before breakfast  polyethylene glycol 3350 17 Gram(s) Oral every 12 hours  predniSONE   Tablet 40 milliGRAM(s) Oral daily  senna 2 Tablet(s) Oral at bedtime  tamsulosin 0.4 milliGRAM(s) Oral at bedtime    MEDICATIONS  (PRN):  acetaminophen     Tablet .. 650 milliGRAM(s) Oral every 6 hours PRN Temp greater or equal to 38C (100.4F), Mild Pain (1 - 3)      ALLERGIES:  Allergies    Cipro (Rash)    Intolerances    Cipro (Other)      LABS:                        16.5   16.75 )-----------( 266      ( 15 Nov 2021 08:03 )             50.4     11-15    131<L>  |  99  |  27<H>  ----------------------------<  120<H>  4.7   |  25  |  0.77    Ca    10.0      15 Nov 2021 17:18  Phos  2.7     11-15  Mg     2.3     11-15          CAPILLARY BLOOD GLUCOSE      POCT Blood Glucose.: 159 mg/dL (15 Nov 2021 21:31)      RADIOLOGY & ADDITIONAL TESTS: Reviewed.

## 2021-11-16 NOTE — PROGRESS NOTE ADULT - TIME BILLING
Patient seen and examined with house-staff during bedside rounds.  Resident note read, including vitals, physical findings, laboratory data, and radiological reports.   Revisions included below.  Direct personal management at bed side and extensive interpretation of the data.  Plan was outlined and discussed in details with the housestaff.  Decision making of high complexity  Action taken for acute disease activity to reflect the level of care provided:  - medication reconciliation  - review laboratory data    The patient in acute hypoxic respiratory failure secondary to bilateral Covid pneumonia.  The liver function test is elevated and will monitor I see him this coming down is related more to his infection rather to at liver abnormality.  Where is started and said it remdesivir once the liver function test is stable.  Renal function is normal.  Continue Decadron.  Wean off the high flow.
Patient seen and examined with house-staff during bedside rounds.  Resident note read, including vitals, physical findings, laboratory data, and radiological reports.   Revisions included below.  Direct personal management at bed side and extensive interpretation of the data.  Plan was outlined and discussed in details with the housestaff.  Decision making of high complexity  Action taken for acute disease activity to reflect the level of care provided:  - medication reconciliation  - review laboratory data  He is clinically stable and off isolation.  He is on NC and decreased the oxygen to 5 l/min.  Continue to wean as toleratedThe patient was desaturate with exertion. Patient will be discharged home on oxygen and wean as tolerated. Continue prednisone 40 mg a day and will wean as as outpatient. I will decreased to 30 mg a day next week. Continue anticoagulation for a total of 6 months for provoked PE. The mental status is stable. Physical therapy evaluation. The family expressed on wanting to go to rehab as the son-in-law is a physical therapist. Discharge planning
Patient seen and examined with house-staff during bedside rounds.  Resident note read, including vitals, physical findings, laboratory data, and radiological reports.   Revisions included below.  Direct personal management at bed side and extensive interpretation of the data.  Plan was outlined and discussed in details with the housestaff.  Decision making of high complexity  Action taken for acute disease activity to reflect the level of care provided:  - medication reconciliation  - review laboratory data

## 2021-11-16 NOTE — BH CONSULTATION LIAISON PROGRESS NOTE - NSBHPSYCHOLCOGABN_PSY_A_CORE
disoriented to time/disoriented to place/disoriented to situation
disoriented to place/disoriented to situation

## 2021-11-16 NOTE — BH CONSULTATION LIAISON PROGRESS NOTE - NSBHFUPINTERVALHXFT_PSY_A_CORE
Psychiatry f/u for assessment of delirium, mood sx, prior concern for SI. Pt receiving tx for covid pneumonia and UTI. Continues to receive solumedrol. Interim hx reviewed - pt with episodes of agitation 10/13 PM - 10/14, attempting to hit staff, received olanzapine 5mg po on 11/13 PM and Haldol 5mg IV on 11/14 early AM. Per documentation, pt voiced "death idea, wanted to go downstrain and go to the street to die". Also has received prn diazepam per MAR.    Stepped down to 7LA. D/w sitter this AM - pt mildly confused, endorsing desire to go home, no reported agitation or SI.    On interview, pt asleep but arousable, calm, oriented to self, "[facility]", "February" 2021, unable to state reason for hospitalization. Pt reported feeling tired and hopeful about going home. He did not recall prior episodes of agitation and denied any desire to harm himself or others, stating "of course not". When asked about any health problems, he stated "I am an old man" and deferred to his daughter to discuss. No reported depressed mood, no AVH. No other complaints voiced.
Psychiatry f/u for assessment of delirium with intermittent agitation, prior expression of SI. Interim hx reviewed. Pt moved to [facility]. No further reported agitation or expression of SI. No PRN medication for agitation per MAR. Mirtazapine discontinued.    On assessment this afternoon, pt found awake, alert, oriented to self, "[facility]", gave the month as "2021", able to redirect to correct month and year; he did not retain his current location after several minutes despite reorientation. Pt reports to be feeling well and denies acute complaints. When informed about prior agitation and expression of SI, pt stated "oh no" and adamantly denied ever experiencing SI. He reported "I am an old man" and "I like to joke" as explanation of episodes of confusion and suicidal statements. He denied any mood complaints, perceptual disturbances, or other problems.

## 2021-11-16 NOTE — DISCHARGE NOTE PROVIDER - NSDCMRMEDTOKEN_GEN_ALL_CORE_FT
amLODIPine 10 mg oral tablet: 1 tab(s) orally once a day  diazePAM 10 mg oral tablet: 1 tab(s) orally 3 times a day, As Needed  diclofenac 1% topical gel:   hydrocodone-acetaminophen 7.5 mg-300 mg oral tablet: orally 2 times a day  losartan-hydrochlorothiazide 100 mg-25 mg oral tablet: 1 tab(s) orally once a day  mirtazapine 30 mg oral tablet: 1 tab(s) orally once a day (at bedtime)  omeprazole 40 mg oral delayed release capsule: 1 cap(s) orally once a day   diclofenac 1% topical gel:   losartan-hydrochlorothiazide 100 mg-25 mg oral tablet: 1 tab(s) orally once a day  omeprazole 40 mg oral delayed release capsule: 1 cap(s) orally once a day   amLODIPine 10 mg oral tablet: 1 tab(s) orally every 24 hours  diclofenac 1% topical gel:   Eliquis 5 mg oral tablet: 1 tab(s) orally 2 times a day   losartan-hydrochlorothiazide 100 mg-25 mg oral tablet: 1 tab(s) orally once a day  omeprazole 40 mg oral delayed release capsule: 1 cap(s) orally once a day  predniSONE 20 mg oral tablet: 2 tab(s) orally once a day  tamsulosin 0.4 mg oral capsule: 1 cap(s) orally once a day (at bedtime)   amLODIPine 10 mg oral tablet: 1 tab(s) orally every 24 hours  diclofenac 1% topical gel:   Eliquis 5 mg oral tablet: 2 tab(s) orally every 12 hours. Please take 2 tabs (10mg), twice a day for 4 days. After four days, take 1 tab (5mg)  twice a day for six months starting on 11/22.  losartan 50 mg oral tablet: 1 tab(s) orally once a day.  omeprazole 40 mg oral delayed release capsule: 1 cap(s) orally once a day  predniSONE 10 mg oral tablet: 3 tab(s) orally once a day. Please take Prednisons 30mg starting on 11/22, following 4 days of Prednisone 40. You will take Prednisone 30 for 7 days unless otherwise instructed by your healthcare provider.    predniSONE 10 mg oral tablet: 4 tab(s) orally once a day . Please take 4 tabs once a day for a total of 40 mg for 4 days. Then please take 3 tabs once a day for a total of 30mg for 7 days.   tamsulosin 0.4 mg oral capsule: 1 cap(s) orally once a day (at bedtime)

## 2021-11-16 NOTE — BH CONSULTATION LIAISON PROGRESS NOTE - NSICDXBHPRIMARYDX_PSY_ALL_CORE
Delirium due to multiple etiologies, acute, mixed level of activity   F05  
Delirium due to multiple etiologies, acute, mixed level of activity   F05

## 2021-11-17 ENCOUNTER — TRANSCRIPTION ENCOUNTER (OUTPATIENT)
Age: 71
End: 2021-11-17

## 2021-11-17 LAB
GLUCOSE BLDC GLUCOMTR-MCNC: 109 MG/DL — HIGH (ref 70–99)
GLUCOSE BLDC GLUCOMTR-MCNC: 134 MG/DL — HIGH (ref 70–99)
GLUCOSE BLDC GLUCOMTR-MCNC: 169 MG/DL — HIGH (ref 70–99)
GLUCOSE BLDC GLUCOMTR-MCNC: 91 MG/DL — SIGNIFICANT CHANGE UP (ref 70–99)
SARS-COV-2 RNA SPEC QL NAA+PROBE: SIGNIFICANT CHANGE UP

## 2021-11-17 PROCEDURE — 99239 HOSP IP/OBS DSCHRG MGMT >30: CPT

## 2021-11-17 RX ORDER — APIXABAN 2.5 MG/1
2 TABLET, FILM COATED ORAL
Qty: 737 | Refills: 0
Start: 2021-11-17 | End: 2022-05-15

## 2021-11-17 RX ORDER — APIXABAN 2.5 MG/1
2 TABLET, FILM COATED ORAL
Qty: 20 | Refills: 1
Start: 2021-11-17 | End: 2021-11-26

## 2021-11-17 RX ORDER — APIXABAN 2.5 MG/1
10 TABLET, FILM COATED ORAL EVERY 12 HOURS
Refills: 0 | Status: DISCONTINUED | OUTPATIENT
Start: 2021-11-17 | End: 2021-11-18

## 2021-11-17 RX ORDER — LOSARTAN/HYDROCHLOROTHIAZIDE 100MG-25MG
1 TABLET ORAL
Qty: 0 | Refills: 0 | DISCHARGE

## 2021-11-17 RX ORDER — LOSARTAN POTASSIUM 100 MG/1
1 TABLET, FILM COATED ORAL
Qty: 30 | Refills: 0
Start: 2021-11-17 | End: 2021-12-16

## 2021-11-17 RX ADMIN — POLYETHYLENE GLYCOL 3350 17 GRAM(S): 17 POWDER, FOR SOLUTION ORAL at 07:06

## 2021-11-17 RX ADMIN — POLYETHYLENE GLYCOL 3350 17 GRAM(S): 17 POWDER, FOR SOLUTION ORAL at 17:22

## 2021-11-17 RX ADMIN — TAMSULOSIN HYDROCHLORIDE 0.4 MILLIGRAM(S): 0.4 CAPSULE ORAL at 22:32

## 2021-11-17 RX ADMIN — Medication 1 TABLET(S): at 12:44

## 2021-11-17 RX ADMIN — SENNA PLUS 2 TABLET(S): 8.6 TABLET ORAL at 22:32

## 2021-11-17 RX ADMIN — Medication 650 MILLIGRAM(S): at 11:00

## 2021-11-17 RX ADMIN — PANTOPRAZOLE SODIUM 40 MILLIGRAM(S): 20 TABLET, DELAYED RELEASE ORAL at 07:06

## 2021-11-17 RX ADMIN — Medication 40 MILLIGRAM(S): at 17:22

## 2021-11-17 RX ADMIN — AMLODIPINE BESYLATE 10 MILLIGRAM(S): 2.5 TABLET ORAL at 07:06

## 2021-11-17 RX ADMIN — Medication 1: at 17:21

## 2021-11-17 RX ADMIN — APIXABAN 10 MILLIGRAM(S): 2.5 TABLET, FILM COATED ORAL at 17:22

## 2021-11-17 RX ADMIN — LOSARTAN POTASSIUM 50 MILLIGRAM(S): 100 TABLET, FILM COATED ORAL at 12:45

## 2021-11-17 RX ADMIN — Medication 650 MILLIGRAM(S): at 10:20

## 2021-11-17 NOTE — DISCHARGE NOTE NURSING/CASE MANAGEMENT/SOCIAL WORK - NSSCNAMETXT_GEN_ALL_CORE
[facility] (formerly [facility])

## 2021-11-17 NOTE — PROGRESS NOTE ADULT - PROVIDER SPECIALTY LIST ADULT
Internal Medicine
Internal Medicine
MICU
Internal Medicine
MICU
Rehab Medicine
Internal Medicine
Internal Medicine

## 2021-11-17 NOTE — PROGRESS NOTE ADULT - ASSESSMENT
Patient is a 70yo M w/ PM of HTN, depression, chronic back pain who was transferred from [facility], where he was admitted on 11/2 after an unwitnessed fall (down for >1 day) and found to have acute hypoxic respiratory failure secondary to COVID-19, altered mental status and rhabdomyolysis. Due to increased work of breathing and oxygen requirements patient was upgraded to MICU for closer monitoring. Stable on 6LNC and now monitoring RMF. Plan for discharge tomorrow with home O2 and pending TOV.

## 2021-11-17 NOTE — PROGRESS NOTE ADULT - PROBLEM/PLAN-7
DISPLAY PLAN FREE TEXT
Statement Selected
DISPLAY PLAN FREE TEXT

## 2021-11-17 NOTE — PROGRESS NOTE ADULT - PROBLEM SELECTOR PLAN 5
Patient with urinary retention likely 2/2 BPH. Patient failed TOV in MICU, posey catheter placed on 11/13.     -Pt continuing to void, Q6hr bladder scans, straight cath as needed   - c/w tamsulosin 0.4mg     #UTI  RESOLVED

## 2021-11-17 NOTE — PROGRESS NOTE ADULT - PROBLEM SELECTOR PROBLEM 11
Prophylactic measure
Major depression
Prophylactic measure
Chronic back pain
Prophylactic measure

## 2021-11-17 NOTE — PROGRESS NOTE ADULT - PROBLEM SELECTOR PLAN 3
Patient Covid positive on 11/3, CXR with bilateral hazy opacities c/w viral COVID PNA. CT also consistent with Covid PNA. Patient s/p Remdesivir (completed 11/8) and decadron (11/3-11/10), not a candidate for Tocilizumab.  - c/w Prednisone 40mg po qd, taper to 30mg starting 11/22 for 7 days   -Wean O2 as tolerated     # Pneumonitis  In the ICU, patient started treatment for pneumonitis with solumedrol (11/11-11/15).  - management as above with steroids

## 2021-11-17 NOTE — PROGRESS NOTE ADULT - PROBLEM SELECTOR PLAN 4
Patient appeared mildly confused at times. Encephalopathy likely in setting of mixed delirium 2/2 to multiple factors (covid pneumonia, UTI, use of IV steroids).  - Continue to monitor  - Psychiatry consulted for delirium, mood sx, prior concern for SI  - discontinue Mirtazapine 30mg as per Psych  - avoid use of benzoziapines and anticholinergics  - for acute agitation, can use haloperidol 2mg po/IM/VI Q6H PRN (monitor QTc)  - delirium precautions

## 2021-11-17 NOTE — PROGRESS NOTE ADULT - PROBLEM SELECTOR PLAN 8
Patient with history of hypertension. Home meds: amlodipine 10mg and losartan-HCTZ 100/25. -140's last 24hrs.  -c/w amlodipine 10mg  -c/w losartan 50 mg

## 2021-11-17 NOTE — CHART NOTE - NSCHARTNOTEFT_GEN_A_CORE
Spoke with patient's daughters Julianne and Leila (both HCP for the patient) who expressed that they understand that the patient is medically ready for discharge, however, they feel that they weren't given enough time to arrange a safe discharge for him.  They would like him to go to a rehab facility near their home, however, he would need a negative covid swab for this to happen, and we have agreed to obtain a repeat swab tonight.  However, they understand that if he were to test positive, he does have the option for another rehab facility that accepts patients who test positive for covid, or, he could go home on oxygen.  They are looking into finding him home care if this happens.  They are asking to please not start the 24 hour notice until tomorrow morning at which time we will reconvene with the results of his covid test.

## 2021-11-17 NOTE — PROGRESS NOTE ADULT - REASON FOR ADMISSION
Acute hypoxic respiratory failure 2/2 covid pna
Acute Hypoxic Respiratory Failure 2/2 COVID-19
Acute hypoxic respiratory failure 2/2 covid pna

## 2021-11-17 NOTE — PROGRESS NOTE ADULT - PROBLEM SELECTOR PLAN 6
Patient with history of major depression. Home med: Mirtazapine 30mg daily. While in the ICU, patient expressed SI and was seen by Psych. As per psych, patient is low acute risk of suicide.  They do not recommend adding any medication at this time. Per psych, no psychiatric contraindications to discharge, if depressive symptoms persist, encourage psychiatric f/u as outpt.  - Psychiatry consulted  - discontinue Mirtazapine 30mg as per Psych  - avoid use of benzoziapines and anticholinergics  - for acute agitation, can use haloperidol 2mg po/IM/VI Q6H PRN (monitor QTc)  - delirium precautions

## 2021-11-17 NOTE — PROGRESS NOTE ADULT - PROBLEM SELECTOR PLAN 7
Labs with iso-osmolar hyponatremia last 2 days.  - Continue to monitor daily BMP   - monitor fluid status

## 2021-11-17 NOTE — PROGRESS NOTE ADULT - PROBLEM SELECTOR PLAN 11
Pt on Mirtazapine for depression.  Home medication was restarted.
F: IVF as appropriate  E: replete K<4, Mg<2 as appropriate  N: mince and moist, mildly thick liquids as per S/S    VTE Prophylaxis: eliquis  GI: not needed  C: Full Code  D: PT rec LORIE; home after O2 weaned
Pt on Mirtazapine for depression.  Holding for now given mental status.   - Continue to monitor mental status and restart as needed
Pt on Mirtazapine for depression.  Holding for now given mental status.   - Continue to monitor mental status and restart as needed
Pt on Mirtazapine for depression.  Home medication was restarted.
F: IVF as needed  E: replete K<4, Mg<2 as appropriate  N: Soft and Bit sized diet     VTE Prophylaxis: eliquis  GI: not needed  C: Full Code  D: PT rec LORIE
Patient with history of chronic back pain, no acute complaint during this hospitalization.   - Pt on diazepam 10 mg and Narco 7.5-325mg at home -- on hold due to somnolence  - would avoid adding home opioids at this time due to risk of oversedation
F: IVF as appropriate  E: replete K<4, Mg<2 as appropriate  N: mince and moist, mildly thick liquids as per S/S    VTE Prophylaxis: eliquis  GI: not needed  C: Full Code  D: PT rec LORIE; dispo after O2 weaned

## 2021-11-17 NOTE — PROGRESS NOTE ADULT - PROBLEM SELECTOR PLAN 1
Patient with AHRF 2/2 COVID-19 Pneumonia and then found to have segmental PE's. CXR and CT angio c/w COVID pna and PE. s/p Remdesivir (completed 11/8) and decadron (11/3-11/10), not a candidate for Tocilizumab, no indication for antibiotics at this time, procalcitonin negative. D dimer uptrending from 2655 to 3549 and then down to 2844 on 11/11. CXR stable. s/p HFNC with CPAP, now on nasal canula.  - continue to wean oxygen as tolerated  -Prednisone 40 until 11/21, then taper to Prednisone 30 for 7 days

## 2021-11-17 NOTE — PROGRESS NOTE ADULT - SUBJECTIVE AND OBJECTIVE BOX
OVERNIGHT EVENTS: SARA    SUBJECTIVE / INTERVAL HPI: Patient seen and examined at bedside. Pt in no acute distress. Feeling well, denying any SOB, CP, nausea, vomiting, fevers, chills.     VITAL SIGNS:  Vital Signs Last 24 Hrs  T(C): 36.6 (17 Nov 2021 10:00), Max: 37.1 (17 Nov 2021 05:00)  T(F): 97.9 (17 Nov 2021 10:00), Max: 98.8 (17 Nov 2021 05:00)  HR: 84 (17 Nov 2021 10:00) (65 - 84)  BP: 115/84 (17 Nov 2021 12:10) (108/73 - 117/81)  BP(mean): --  RR: 19 (17 Nov 2021 10:00) (17 - 20)  SpO2: 93% (17 Nov 2021 10:00) (93% - 95%)    PHYSICAL EXAM:    General: NAD, resting comfortably in bed   HEENT: NC/AT; PERRL, anicteric sclera; MMM  Neck: supple  Cardiovascular: +S1/S2, RRR  Respiratory: CTA B/L; no W/R/R  Gastrointestinal: soft, NT/ND; +BSx4  Extremities: WWP; no edema, clubbing or cyanosis  Vascular: 2+ radial, DP/PT pulses B/L  Neurological: AAOx3    MEDICATIONS:  MEDICATIONS  (STANDING):  amLODIPine   Tablet 10 milliGRAM(s) Oral every 24 hours  apixaban 10 milliGRAM(s) Oral every 12 hours  insulin lispro (ADMELOG) corrective regimen sliding scale   SubCutaneous Before meals and at bedtime  losartan 50 milliGRAM(s) Oral daily  multivitamin 1 Tablet(s) Oral daily  pantoprazole    Tablet 40 milliGRAM(s) Oral before breakfast  polyethylene glycol 3350 17 Gram(s) Oral every 12 hours  predniSONE   Tablet 40 milliGRAM(s) Oral daily  senna 2 Tablet(s) Oral at bedtime  sodium chloride 0.9%. 1000 milliLiter(s) (75 mL/Hr) IV Continuous <Continuous>  tamsulosin 0.4 milliGRAM(s) Oral at bedtime    MEDICATIONS  (PRN):  acetaminophen     Tablet .. 650 milliGRAM(s) Oral every 6 hours PRN Temp greater or equal to 38C (100.4F), Mild Pain (1 - 3)      ALLERGIES:  Allergies    Cipro (Rash)    Intolerances    Cipro (Other)      LABS:                        15.0   15.14 )-----------( 289      ( 16 Nov 2021 08:21 )             45.4     11-16    130<L>  |  98  |  24<H>  ----------------------------<  108<H>  4.4   |  21<L>  |  0.82    Ca    9.6      16 Nov 2021 08:21  Phos  2.6     11-16  Mg     2.2     11-16          CAPILLARY BLOOD GLUCOSE      POCT Blood Glucose.: 91 mg/dL (17 Nov 2021 12:26)      RADIOLOGY & ADDITIONAL TESTS: Reviewed. OVERNIGHT EVENTS: SARA    SUBJECTIVE / INTERVAL HPI: Patient seen and examined at bedside. Pt in no acute distress. Feeling well, denying any SOB, CP, nausea, vomiting, fevers, chills.     VITAL SIGNS:  Vital Signs Last 24 Hrs  T(C): 36.6 (17 Nov 2021 10:00), Max: 37.1 (17 Nov 2021 05:00)  T(F): 97.9 (17 Nov 2021 10:00), Max: 98.8 (17 Nov 2021 05:00)  HR: 84 (17 Nov 2021 10:00) (65 - 84)  BP: 115/84 (17 Nov 2021 12:10) (108/73 - 117/81)  BP(mean): --  RR: 19 (17 Nov 2021 10:00) (17 - 20)  SpO2: 93% (17 Nov 2021 10:00) (93% - 95%)    PHYSICAL EXAM:    General: NAD, resting comfortably in bed   HEENT: NC/AT; PERRL, anicteric sclera; MMM  Neck: supple  Cardiovascular: +S1/S2, RRR  Respiratory: CTA B/L; no W/R/R. On NC supplemental oxygen.   Gastrointestinal: soft, NT/ND; +BSx4  Extremities: WWP; no edema, clubbing or cyanosis  Vascular: 2+ radial, DP/PT pulses B/L  Neurological: AAOx3    MEDICATIONS:  MEDICATIONS  (STANDING):  amLODIPine   Tablet 10 milliGRAM(s) Oral every 24 hours  apixaban 10 milliGRAM(s) Oral every 12 hours  insulin lispro (ADMELOG) corrective regimen sliding scale   SubCutaneous Before meals and at bedtime  losartan 50 milliGRAM(s) Oral daily  multivitamin 1 Tablet(s) Oral daily  pantoprazole    Tablet 40 milliGRAM(s) Oral before breakfast  polyethylene glycol 3350 17 Gram(s) Oral every 12 hours  predniSONE   Tablet 40 milliGRAM(s) Oral daily  senna 2 Tablet(s) Oral at bedtime  sodium chloride 0.9%. 1000 milliLiter(s) (75 mL/Hr) IV Continuous <Continuous>  tamsulosin 0.4 milliGRAM(s) Oral at bedtime    MEDICATIONS  (PRN):  acetaminophen     Tablet .. 650 milliGRAM(s) Oral every 6 hours PRN Temp greater or equal to 38C (100.4F), Mild Pain (1 - 3)      ALLERGIES:  Allergies    Cipro (Rash)    Intolerances    Cipro (Other)      LABS:                        15.0   15.14 )-----------( 289      ( 16 Nov 2021 08:21 )             45.4     11-16    130<L>  |  98  |  24<H>  ----------------------------<  108<H>  4.4   |  21<L>  |  0.82    Ca    9.6      16 Nov 2021 08:21  Phos  2.6     11-16  Mg     2.2     11-16          CAPILLARY BLOOD GLUCOSE      POCT Blood Glucose.: 91 mg/dL (17 Nov 2021 12:26)      RADIOLOGY & ADDITIONAL TESTS: Reviewed.

## 2021-11-17 NOTE — PROGRESS NOTE ADULT - PROBLEM SELECTOR PLAN 2
CTPE on 11/10: Acute PE segmental branches right upper lobe posterior segment and right lower lobe posterior basal segment. TTE with mild tricuspid regurgitation, normal right ventricle size and systolic function--no RV strain. S/p Lovenox 11/10-11/15. D dimer uptrending from 2655 to 3549 and then down to 2844 on 11/11.  - c/w Eliquis 10mg BID through 11/21, switch to Eliquis 5mg BID for 6 months

## 2021-11-18 ENCOUNTER — TRANSCRIPTION ENCOUNTER (OUTPATIENT)
Age: 71
End: 2021-11-18

## 2021-11-18 VITALS
TEMPERATURE: 99 F | DIASTOLIC BLOOD PRESSURE: 77 MMHG | HEART RATE: 88 BPM | RESPIRATION RATE: 18 BRPM | OXYGEN SATURATION: 94 % | SYSTOLIC BLOOD PRESSURE: 123 MMHG

## 2021-11-18 LAB
GLUCOSE BLDC GLUCOMTR-MCNC: 119 MG/DL — HIGH (ref 70–99)
GLUCOSE BLDC GLUCOMTR-MCNC: 137 MG/DL — HIGH (ref 70–99)
GLUCOSE BLDC GLUCOMTR-MCNC: 85 MG/DL — SIGNIFICANT CHANGE UP (ref 70–99)
SARS-COV-2 RNA SPEC QL NAA+PROBE: POSITIVE

## 2021-11-18 PROCEDURE — 99232 SBSQ HOSP IP/OBS MODERATE 35: CPT | Mod: GC

## 2021-11-18 RX ADMIN — Medication 1 TABLET(S): at 11:59

## 2021-11-18 RX ADMIN — LOSARTAN POTASSIUM 50 MILLIGRAM(S): 100 TABLET, FILM COATED ORAL at 11:59

## 2021-11-18 RX ADMIN — POLYETHYLENE GLYCOL 3350 17 GRAM(S): 17 POWDER, FOR SOLUTION ORAL at 18:47

## 2021-11-18 RX ADMIN — AMLODIPINE BESYLATE 10 MILLIGRAM(S): 2.5 TABLET ORAL at 06:49

## 2021-11-18 RX ADMIN — APIXABAN 10 MILLIGRAM(S): 2.5 TABLET, FILM COATED ORAL at 06:49

## 2021-11-18 RX ADMIN — POLYETHYLENE GLYCOL 3350 17 GRAM(S): 17 POWDER, FOR SOLUTION ORAL at 06:49

## 2021-11-18 RX ADMIN — PANTOPRAZOLE SODIUM 40 MILLIGRAM(S): 20 TABLET, DELAYED RELEASE ORAL at 06:50

## 2021-11-18 RX ADMIN — APIXABAN 10 MILLIGRAM(S): 2.5 TABLET, FILM COATED ORAL at 18:47

## 2021-11-18 RX ADMIN — Medication 40 MILLIGRAM(S): at 18:47

## 2021-11-18 NOTE — CHART NOTE - NSCHARTNOTEFT_GEN_A_CORE
Patient seen and examined with house-staff during bedside rounds.  Resident note read, including vitals, physical findings, laboratory data, and radiological reports.   Revisions included below.  Direct personal management at bed side and extensive interpretation of the data.  Plan was outlined and discussed in details with the housestaff.  Decision making of high complexity  Action taken for acute disease activity to reflect the level of care provided:  - medication reconciliation  - review laboratory dataThe patient clinically stable.  Is afebrile.  The patient oxygen nasal cannula 2 L with adequate sat.  Continue prednisone.  Decreased to 30 mg next week.  Continue full anticoagulation.  Await discharge to his office.

## 2021-11-18 NOTE — DISCHARGE NOTE NURSING/CASE MANAGEMENT/SOCIAL WORK - NSDCPEELIQUISREACT_GEN_ALL_CORE
Apixaban/Eliquis increases your risk for bleeding. Notify your doctor if you experience any of the following side effects: bleeding, coughing or vomiting blood, red or black stool, unexpected pain or swelling, itching or hives, chest pain, chest tightness, trouble breathing, changes in how much or how often you urinate, red or pink urine, numbness or tingling in your feet, or unusual muscle weakness. When Apixaban/Eliquis is taken with other medicines, they can affect how it works. Taking other medications such as aspirin, blood thinners, nonsteroidal anti-inflammatories, and medications that treat depression can increase your risk of bleeding. It is very important to tell your health care provider about all of the other medicines, including over-the-counter medications, herbs, and vitamins you are taking. DO NOT start, stop, or change the dosage of any medicine, including over-the-counter medicines, vitamins, and herbal products without your doctor’s approval. Any products containing aspirin or are nonsteroidal anti-inflammatories lessen the blood’s ability to form clots and add to the effect of Apixaban/Eliquis. Never take aspirin or medicines that contain aspirin without speaking to your doctor.
Apixaban/Eliquis increases your risk for bleeding. Notify your doctor if you experience any of the following side effects: bleeding, coughing or vomiting blood, red or black stool, unexpected pain or swelling, itching or hives, chest pain, chest tightness, trouble breathing, changes in how much or how often you urinate, red or pink urine, numbness or tingling in your feet, or unusual muscle weakness. When Apixaban/Eliquis is taken with other medicines, they can affect how it works. Taking other medications such as aspirin, blood thinners, nonsteroidal anti-inflammatories, and medications that treat depression can increase your risk of bleeding. It is very important to tell your health care provider about all of the other medicines, including over-the-counter medications, herbs, and vitamins you are taking. DO NOT start, stop, or change the dosage of any medicine, including over-the-counter medicines, vitamins, and herbal products without your doctor’s approval. Any products containing aspirin or are nonsteroidal anti-inflammatories lessen the blood’s ability to form clots and add to the effect of Apixaban/Eliquis. Never take aspirin or medicines that contain aspirin without speaking to your doctor.

## 2021-11-18 NOTE — CHART NOTE - NSCHARTNOTESELECT_GEN_ALL_CORE
Brief CL Note
Event Note
Event Note
Infectious diseases brief note/Event Note
Nutrition Follow Up/Nutrition Services
medication review

## 2021-11-18 NOTE — DISCHARGE NOTE NURSING/CASE MANAGEMENT/SOCIAL WORK - NSDCPEELIQUISDIET_GEN_ALL_CORE
Eat healthy foods you enjoy. Apixaban/Eliquis DOES NOT have a special diet. Limit your alcohol intake.
Eat healthy foods you enjoy. Apixaban/Eliquis DOES NOT have a special diet. Limit your alcohol intake.

## 2021-11-18 NOTE — DISCHARGE NOTE NURSING/CASE MANAGEMENT/SOCIAL WORK - NSDCPEELIQUISFU_GEN_ALL_CORE
Go for blood tests as directed. Your doctor will do lab tests at regular visits to monitor the effects of this medicine. Please follow up with your doctor and keep your health care provider appointments.
Go for blood tests as directed. Your doctor will do lab tests at regular visits to monitor the effects of this medicine. Please follow up with your doctor and keep your health care provider appointments.

## 2021-11-18 NOTE — DISCHARGE NOTE NURSING/CASE MANAGEMENT/SOCIAL WORK - NSDCPEELIQUISCOMP_GEN_ALL_CORE
Apixaban/Eliquis is used to treat and prevent blood clots. If you are not able to swallow the tablets whole, they may be crushed and mixed in water, apple juice, or applesauce and promptly taken within four hours. Never skip a dose of Apixaban/Eliquis. If you forget to take your Apixaban/Eliquis, take a dose as soon as you remember. If it is almost time for your next Apixaban/Eliquis dose, wait until then and take a regular dose. DO NOT take an extra pill to ‘catch up’.  NEVER TAKE A DOUBLE DOSE. Notify your doctor that you missed a dose. Take Apixaban/Eliquis at the same time each morning and evening. Apixaban/Eliquis may be taken with other medication or food.
Apixaban/Eliquis is used to treat and prevent blood clots. If you are not able to swallow the tablets whole, they may be crushed and mixed in water, apple juice, or applesauce and promptly taken within four hours. Never skip a dose of Apixaban/Eliquis. If you forget to take your Apixaban/Eliquis, take a dose as soon as you remember. If it is almost time for your next Apixaban/Eliquis dose, wait until then and take a regular dose. DO NOT take an extra pill to ‘catch up’.  NEVER TAKE A DOUBLE DOSE. Notify your doctor that you missed a dose. Take Apixaban/Eliquis at the same time each morning and evening. Apixaban/Eliquis may be taken with other medication or food.

## 2021-11-18 NOTE — DISCHARGE NOTE NURSING/CASE MANAGEMENT/SOCIAL WORK - NSDCPEELIQUIS_GEN_ALL_CORE
Apixaban/Eliquis - Compliance/Apixaban/Eliquis - Dietary Advice/Apixaban/Eliquis - Follow up monitoring/Apixaban/Eliquis - Potential for adverse drug reactions and interactions
Apixaban/Eliquis - Compliance/Apixaban/Eliquis - Dietary Advice/Apixaban/Eliquis - Follow up monitoring/Apixaban/Eliquis - Potential for adverse drug reactions and interactions

## 2021-11-18 NOTE — DISCHARGE NOTE NURSING/CASE MANAGEMENT/SOCIAL WORK - NSDCFUADDAPPT_GEN_ALL_CORE_FT
Oriented - self; Oriented - place; Oriented - time Please follow up with Dr. Dangelo in 1 week to monitor your recovery from COVID pneumonia. A message has been left with Dr. Dangelo's office. They will contact you or one of your family members to schedule an appointment for next week.

## 2021-11-18 NOTE — DISCHARGE NOTE NURSING/CASE MANAGEMENT/SOCIAL WORK - PATIENT PORTAL LINK FT
You can access the FollowMyHealth Patient Portal offered by [facility] by registering at the following website: [email or web address]. By joining [facility]’s FollowMyHealth portal, you will also be able to view your health information using other applications (apps) compatible with our system.
You can access the FollowMyHealth Patient Portal offered by [facility] by registering at the following website: [email or web address]. By joining [facility]’s FollowMyHealth portal, you will also be able to view your health information using other applications (apps) compatible with our system.

## 2021-11-22 RX ORDER — APIXABAN 2.5 MG/1
2 TABLET, FILM COATED ORAL
Qty: 720 | Refills: 0
Start: 2021-11-22 | End: 2022-05-20

## 2021-11-23 PROBLEM — I10 ESSENTIAL (PRIMARY) HYPERTENSION: Chronic | Status: ACTIVE | Noted: 2021-11-02

## 2021-11-23 PROBLEM — F32.9 MAJOR DEPRESSIVE DISORDER, SINGLE EPISODE, UNSPECIFIED: Chronic | Status: ACTIVE | Noted: 2021-11-02

## 2021-11-23 PROBLEM — M54.9 DORSALGIA, UNSPECIFIED: Chronic | Status: ACTIVE | Noted: 2021-11-02

## 2021-11-29 DIAGNOSIS — E87.1 HYPO-OSMOLALITY AND HYPONATREMIA: ICD-10-CM

## 2021-11-29 DIAGNOSIS — G93.49 OTHER ENCEPHALOPATHY: ICD-10-CM

## 2021-11-29 DIAGNOSIS — N40.1 BENIGN PROSTATIC HYPERPLASIA WITH LOWER URINARY TRACT SYMPTOMS: ICD-10-CM

## 2021-11-29 DIAGNOSIS — G89.29 OTHER CHRONIC PAIN: ICD-10-CM

## 2021-11-29 DIAGNOSIS — U07.1 COVID-19: ICD-10-CM

## 2021-11-29 DIAGNOSIS — F13.20 SEDATIVE, HYPNOTIC OR ANXIOLYTIC DEPENDENCE, UNCOMPLICATED: ICD-10-CM

## 2021-11-29 DIAGNOSIS — B96.89 OTHER SPECIFIED BACTERIAL AGENTS AS THE CAUSE OF DISEASES CLASSIFIED ELSEWHERE: ICD-10-CM

## 2021-11-29 DIAGNOSIS — J96.01 ACUTE RESPIRATORY FAILURE WITH HYPOXIA: ICD-10-CM

## 2021-11-29 DIAGNOSIS — E87.0 HYPEROSMOLALITY AND HYPERNATREMIA: ICD-10-CM

## 2021-11-29 DIAGNOSIS — E86.1 HYPOVOLEMIA: ICD-10-CM

## 2021-11-29 DIAGNOSIS — Z91.81 HISTORY OF FALLING: ICD-10-CM

## 2021-11-29 DIAGNOSIS — I26.94 MULTIPLE SUBSEGMENTAL PULMONARY EMBOLI WITHOUT ACUTE COR PULMONALE: ICD-10-CM

## 2021-11-29 DIAGNOSIS — N39.0 URINARY TRACT INFECTION, SITE NOT SPECIFIED: ICD-10-CM

## 2021-11-29 DIAGNOSIS — I10 ESSENTIAL (PRIMARY) HYPERTENSION: ICD-10-CM

## 2021-11-29 DIAGNOSIS — T38.0X5A ADVERSE EFFECT OF GLUCOCORTICOIDS AND SYNTHETIC ANALOGUES, INITIAL ENCOUNTER: ICD-10-CM

## 2021-11-29 DIAGNOSIS — M62.82 RHABDOMYOLYSIS: ICD-10-CM

## 2021-11-29 DIAGNOSIS — J80 ACUTE RESPIRATORY DISTRESS SYNDROME: ICD-10-CM

## 2021-11-29 DIAGNOSIS — J12.82 PNEUMONIA DUE TO CORONAVIRUS DISEASE 2019: ICD-10-CM

## 2021-11-29 DIAGNOSIS — F05 DELIRIUM DUE TO KNOWN PHYSIOLOGICAL CONDITION: ICD-10-CM

## 2021-11-29 DIAGNOSIS — R74.01 ELEVATION OF LEVELS OF LIVER TRANSAMINASE LEVELS: ICD-10-CM

## 2021-11-29 DIAGNOSIS — A41.89 OTHER SPECIFIED SEPSIS: ICD-10-CM

## 2021-11-29 DIAGNOSIS — E87.6 HYPOKALEMIA: ICD-10-CM

## 2021-11-29 DIAGNOSIS — I21.A1 MYOCARDIAL INFARCTION TYPE 2: ICD-10-CM

## 2021-11-29 DIAGNOSIS — J84.9 INTERSTITIAL PULMONARY DISEASE, UNSPECIFIED: ICD-10-CM

## 2021-11-29 DIAGNOSIS — M54.9 DORSALGIA, UNSPECIFIED: ICD-10-CM

## 2021-11-29 DIAGNOSIS — F32.A DEPRESSION, UNSPECIFIED: ICD-10-CM

## 2021-11-29 DIAGNOSIS — I36.1 NONRHEUMATIC TRICUSPID (VALVE) INSUFFICIENCY: ICD-10-CM

## 2021-11-29 DIAGNOSIS — R73.03 PREDIABETES: ICD-10-CM

## 2021-11-29 DIAGNOSIS — R33.8 OTHER RETENTION OF URINE: ICD-10-CM

## 2021-11-29 DIAGNOSIS — I45.10 UNSPECIFIED RIGHT BUNDLE-BRANCH BLOCK: ICD-10-CM

## 2021-12-22 PROCEDURE — 97161 PT EVAL LOW COMPLEX 20 MIN: CPT

## 2021-12-22 PROCEDURE — 83036 HEMOGLOBIN GLYCOSYLATED A1C: CPT

## 2021-12-22 PROCEDURE — 85027 COMPLETE CBC AUTOMATED: CPT

## 2021-12-22 PROCEDURE — 86769 SARS-COV-2 COVID-19 ANTIBODY: CPT

## 2021-12-22 PROCEDURE — 97116 GAIT TRAINING THERAPY: CPT

## 2021-12-22 PROCEDURE — 84145 PROCALCITONIN (PCT): CPT

## 2021-12-22 PROCEDURE — 94660 CPAP INITIATION&MGMT: CPT

## 2021-12-22 PROCEDURE — 80053 COMPREHEN METABOLIC PANEL: CPT

## 2021-12-22 PROCEDURE — 97530 THERAPEUTIC ACTIVITIES: CPT

## 2021-12-22 PROCEDURE — 86901 BLOOD TYPING SEROLOGIC RH(D): CPT

## 2021-12-22 PROCEDURE — 83615 LACTATE (LD) (LDH) ENZYME: CPT

## 2021-12-22 PROCEDURE — 86850 RBC ANTIBODY SCREEN: CPT

## 2021-12-22 PROCEDURE — 86900 BLOOD TYPING SEROLOGIC ABO: CPT

## 2021-12-22 PROCEDURE — 82550 ASSAY OF CK (CPK): CPT

## 2021-12-22 PROCEDURE — 84100 ASSAY OF PHOSPHORUS: CPT

## 2021-12-22 PROCEDURE — 74230 X-RAY XM SWLNG FUNCJ C+: CPT

## 2021-12-22 PROCEDURE — 81001 URINALYSIS AUTO W/SCOPE: CPT

## 2021-12-22 PROCEDURE — 93321 DOPPLER ECHO F-UP/LMTD STD: CPT

## 2021-12-22 PROCEDURE — 36415 COLL VENOUS BLD VENIPUNCTURE: CPT

## 2021-12-22 PROCEDURE — 82803 BLOOD GASES ANY COMBINATION: CPT

## 2021-12-22 PROCEDURE — 71275 CT ANGIOGRAPHY CHEST: CPT

## 2021-12-22 PROCEDURE — 82728 ASSAY OF FERRITIN: CPT

## 2021-12-22 PROCEDURE — 82553 CREATINE MB FRACTION: CPT

## 2021-12-22 PROCEDURE — 80048 BASIC METABOLIC PNL TOTAL CA: CPT

## 2021-12-22 PROCEDURE — 92611 MOTION FLUOROSCOPY/SWALLOW: CPT

## 2021-12-22 PROCEDURE — 84443 ASSAY THYROID STIM HORMONE: CPT

## 2021-12-22 PROCEDURE — 97110 THERAPEUTIC EXERCISES: CPT

## 2021-12-22 PROCEDURE — 84484 ASSAY OF TROPONIN QUANT: CPT

## 2021-12-22 PROCEDURE — 83880 ASSAY OF NATRIURETIC PEPTIDE: CPT

## 2021-12-22 PROCEDURE — 83930 ASSAY OF BLOOD OSMOLALITY: CPT

## 2021-12-22 PROCEDURE — 86140 C-REACTIVE PROTEIN: CPT

## 2021-12-22 PROCEDURE — 85025 COMPLETE CBC W/AUTO DIFF WBC: CPT

## 2021-12-22 PROCEDURE — 82962 GLUCOSE BLOOD TEST: CPT

## 2021-12-22 PROCEDURE — 83735 ASSAY OF MAGNESIUM: CPT

## 2021-12-22 PROCEDURE — 85379 FIBRIN DEGRADATION QUANT: CPT

## 2021-12-22 PROCEDURE — 87635 SARS-COV-2 COVID-19 AMP PRB: CPT

## 2021-12-22 PROCEDURE — U0003: CPT

## 2021-12-22 PROCEDURE — 92610 EVALUATE SWALLOWING FUNCTION: CPT

## 2021-12-22 PROCEDURE — 87086 URINE CULTURE/COLONY COUNT: CPT

## 2021-12-22 PROCEDURE — U0005: CPT

## 2021-12-22 PROCEDURE — 93970 EXTREMITY STUDY: CPT

## 2021-12-22 PROCEDURE — 87186 SC STD MICRODIL/AGAR DIL: CPT

## 2021-12-22 PROCEDURE — 87040 BLOOD CULTURE FOR BACTERIA: CPT

## 2021-12-22 PROCEDURE — 71045 X-RAY EXAM CHEST 1 VIEW: CPT

## 2021-12-22 PROCEDURE — 82306 VITAMIN D 25 HYDROXY: CPT

## 2021-12-22 PROCEDURE — 97162 PT EVAL MOD COMPLEX 30 MIN: CPT

## 2021-12-22 PROCEDURE — 93005 ELECTROCARDIOGRAM TRACING: CPT

## 2022-01-13 NOTE — ED ADULT NURSE REASSESSMENT NOTE - SKIN TEMPERATURE MOISTURE
Patient remains with one to one staff for constant observation for self harm behaviors. No self harm behaviors noted. Patient verbally reassured. Patient continues to refuse medications for anxiety or sleep stating that he is\"OK\". Patient has refused food and oral fluids this shift so far with no explanation as to why except \" No thank you, I'm ok\". Vital signs stable and patient alert, oriented and talking to staff present in room with patient. warm

## 2022-03-16 ENCOUNTER — APPOINTMENT (OUTPATIENT)
Dept: PULMONOLOGY | Facility: CLINIC | Age: 72
End: 2022-03-16
Payer: MEDICARE

## 2022-03-16 ENCOUNTER — OUTPATIENT (OUTPATIENT)
Dept: OUTPATIENT SERVICES | Facility: HOSPITAL | Age: 72
LOS: 1 days | End: 2022-03-16
Payer: MEDICARE

## 2022-03-16 VITALS
SYSTOLIC BLOOD PRESSURE: 128 MMHG | OXYGEN SATURATION: 97 % | WEIGHT: 190 LBS | TEMPERATURE: 97.8 F | HEART RATE: 79 BPM | BODY MASS INDEX: 28.14 KG/M2 | DIASTOLIC BLOOD PRESSURE: 82 MMHG | HEIGHT: 69 IN

## 2022-03-16 DIAGNOSIS — I10 ESSENTIAL (PRIMARY) HYPERTENSION: ICD-10-CM

## 2022-03-16 DIAGNOSIS — Z87.891 PERSONAL HISTORY OF NICOTINE DEPENDENCE: ICD-10-CM

## 2022-03-16 PROCEDURE — 71250 CT THORAX DX C-: CPT | Mod: 26,MG

## 2022-03-16 PROCEDURE — G1004: CPT

## 2022-03-16 PROCEDURE — 71250 CT THORAX DX C-: CPT | Mod: MG

## 2022-03-16 PROCEDURE — 99215 OFFICE O/P EST HI 40 MIN: CPT | Mod: CS

## 2022-03-16 RX ORDER — APIXABAN 2.5 MG/1
2.5 TABLET, FILM COATED ORAL
Qty: 15 | Refills: 0 | Status: ACTIVE | COMMUNITY
Start: 2022-03-16 | End: 1900-01-01

## 2022-03-16 RX ORDER — LISINOPRIL 2.5 MG/1
2.5 TABLET ORAL DAILY
Qty: 90 | Refills: 1 | Status: ACTIVE | COMMUNITY
Start: 2022-03-16

## 2022-03-16 RX ORDER — AMLODIPINE BESYLATE 5 MG/1
5 TABLET ORAL DAILY
Qty: 30 | Refills: 5 | Status: ACTIVE | COMMUNITY
Start: 2022-03-16

## 2022-03-16 NOTE — HISTORY OF PRESENT ILLNESS
[Never] : never [TextBox_4] : Is doing well since she was discharged from the hospital and finished rehab.  He is off the oxygen.  His on Eliquis.  Is doing very well no limitation is his knee that was hurting.  No coughing or wheezing

## 2022-03-16 NOTE — ASSESSMENT
[FreeTextEntry1] : Pulmonary emboli\par \par Patient has provoked subsegmental low risk pulmonary emboli with no evidence of right ventricular strain.  Patient at that time was hospitalized acute respiratory failure with Covid infection.  The indication for 3 months of total of anticoagulation.  No evidence neither of clinical failure nor bleeding complication.  Discontinue Eliquis.  Instructed the patient is going to require prophylaxis prior to any travel.  Patient with DuoNeb milligram of Eliquis each way prior to travel.\par \par Status post acute hypoxic respiratory failure secondary to bilateral Covid pneumonia.  The patient is clinically improved.  Patient is off Decadron and remdesivir.  The exercise capacity is stable.I ambulated the patient and he did not desaturate.  I reviewed the CT scan of the chest with the patient and the family.  Patient has extensive alveolar and reticular interstitial changes.  The patient is at risk all of post COVID pulmonary fibrosis but he is clinically stable and no evidence of underlying sequelae from Covid.  I will repeat the CT scan of the chest and further recommendation will follow.  Patient will require PFT of the interstitial abnormality consistent with restrictive lung disease on the CT scan

## 2022-04-18 ENCOUNTER — APPOINTMENT (OUTPATIENT)
Dept: PULMONOLOGY | Facility: CLINIC | Age: 72
End: 2022-04-18
Payer: MEDICARE

## 2022-04-18 DIAGNOSIS — U07.1 COVID-19: ICD-10-CM

## 2022-04-18 PROCEDURE — 99443: CPT | Mod: CS,95

## 2022-04-18 NOTE — REASON FOR VISIT
[Home] : at home, [unfilled] , at the time of the visit. [Medical Office: ([facility])___] : at the medical office located in  [Follow-Up] : a follow-up visit

## 2022-04-18 NOTE — ASSESSMENT
[FreeTextEntry1] : The patient is clinically stable.  His exercise capacity increased but limited due to has musculoskeletal disorder.  The patient is off oxygen since she was discharged.  The cough is sporadic.  The patient is off anticoagulation as he finished 3 months treatment for provoked pulmonary emboli related to his COVID.  Reviewed the CT scan images and compared to the previous 1.  There is resolution of the ground glass opacity but now there is fibrotic changes with bronchiectasis.  At this time the patient is improving no indication for antifibrotic I would repeat the CT scan in 6 months.  I will follow the patient in my office in 3 months and further recommendation will follow.

## 2022-07-28 NOTE — BH CONSULTATION LIAISON ASSESSMENT NOTE - NSICDXBHPRIMARYDX_PSY_ALL_CORE
atypical AN  MDD  r/o unspecified anxiety d/o
Acute hypoactive delirium due to multiple etiologies   F05

## 2022-08-22 ENCOUNTER — APPOINTMENT (OUTPATIENT)
Dept: PULMONOLOGY | Facility: CLINIC | Age: 72
End: 2022-08-22

## 2022-10-04 ENCOUNTER — APPOINTMENT (OUTPATIENT)
Dept: PULMONOLOGY | Facility: CLINIC | Age: 72
End: 2022-10-04

## 2022-10-04 ENCOUNTER — APPOINTMENT (OUTPATIENT)
Dept: CT IMAGING | Facility: HOSPITAL | Age: 72
End: 2022-10-04

## 2022-10-04 VITALS
SYSTOLIC BLOOD PRESSURE: 129 MMHG | HEART RATE: 65 BPM | OXYGEN SATURATION: 96 % | DIASTOLIC BLOOD PRESSURE: 79 MMHG | WEIGHT: 198 LBS | TEMPERATURE: 97.5 F | BODY MASS INDEX: 30.01 KG/M2 | HEIGHT: 68 IN

## 2022-10-04 DIAGNOSIS — J96.01 ACUTE RESPIRATORY FAILURE WITH HYPOXIA: ICD-10-CM

## 2022-10-04 DIAGNOSIS — Z23 ENCOUNTER FOR IMMUNIZATION: ICD-10-CM

## 2022-10-04 DIAGNOSIS — I26.99 OTHER PULMONARY EMBOLISM W/OUT ACUTE COR PULMONALE: ICD-10-CM

## 2022-10-04 PROCEDURE — 99213 OFFICE O/P EST LOW 20 MIN: CPT

## 2022-10-04 NOTE — HISTORY OF PRESENT ILLNESS
[Never] : never [TextBox_4] : he is doing well but his leg hurts.  No bleeding.  he is active and walking aroud [ESS] : 0

## 2022-10-04 NOTE — ASSESSMENT
[FreeTextEntry1] : PE\par \par The patient has provoked subsegmental low risk pulmonary emboli post-COVID.  The patient had a 6 months of for anticoagulation and almost a year of prophylaxis since starting anticoagulation.  I will discontinue Xarelto anticoagulation.  The patient is ambulating in low risk for recurrence.\par \par Status post acute hypoxic respiratory failure secondary COVID infection\par \par Patient is scheduled for CT scan of the chest today as initially the groundglass opacity was replaced with fibrotic changes and we will follow-up on that patient might need PFT.  The baseline oxygen saturation is normal in exercise capacity is limited by his arthritis

## 2022-10-06 DIAGNOSIS — E04.1 NONTOXIC SINGLE THYROID NODULE: ICD-10-CM

## 2022-10-18 PROBLEM — E04.1 THYROID NODULE: Status: ACTIVE | Noted: 2022-10-18

## 2023-10-27 NOTE — ED PROVIDER NOTE - CPE EDP ENMT NORM
Health Maintenance Due   Topic Date Due   • Hepatitis B Vaccine (1 of 3 - 3-dose series) Never done   • COVID-19 Vaccine (1) Never done   • Pneumococcal Vaccine 0-64 (1 - PCV) Never done   • Shingles Vaccine (1 of 2) Never done   • Influenza Vaccine (1) Never done   • Depression Screening  10/21/2023       Patient is due for topics as listed above but is not proceeding with Immunization(s) COVID-19, Hep B, Influenza, Pneumococcal and Shingles at this time. Orders placed for Depression Screening . Education provided for Immunization(s) COVID-19, Hep B, Influenza, Pneumococcal and Shingles.   normal...

## 2023-11-06 NOTE — ED ADULT NURSE NOTE - NURSING ED PRESSURE ULCER AREA 2
Patient requests refill of Glipizide.  Last refill 6/12/23 #180 + 1rf.  Last office visit 10/26/23.  Last A1 C= 8.7 ON 10/26/23.  2/8/24 OV scheduled.  Refilled.        
left

## 2024-09-06 NOTE — HISTORY OF PRESENT ILLNESS
[Never] : never [TextBox_4] : he is doing better but still using the cane for his arthritis.  The cough improved.  He stopped the Eliques English

## 2024-09-26 NOTE — PROCEDURE NOTE - NSSITEPREP_SKIN_A_CORE
chlorhexidine/Adherence to aseptic technique: hand hygiene prior to donning barriers (gown, gloves), don cap and mask, sterile drape over patient
chlorhexidine/Adherence to aseptic technique: hand hygiene prior to donning barriers (gown, gloves), don cap and mask, sterile drape over patient
chlorhexidine
Attending Attestation (For Attendings USE Only)...